# Patient Record
Sex: FEMALE | Race: WHITE | Employment: PART TIME | RURAL
[De-identification: names, ages, dates, MRNs, and addresses within clinical notes are randomized per-mention and may not be internally consistent; named-entity substitution may affect disease eponyms.]

---

## 2017-01-05 ENCOUNTER — TELEPHONE (OUTPATIENT)
Dept: FAMILY MEDICINE CLINIC | Age: 52
End: 2017-01-05

## 2017-01-05 DIAGNOSIS — F41.9 ANXIETY: ICD-10-CM

## 2017-01-05 RX ORDER — CLONAZEPAM 0.5 MG/1
0.5 TABLET ORAL
Qty: 10 TAB | Refills: 0 | Status: SHIPPED | OUTPATIENT
Start: 2017-01-05 | End: 2017-05-12

## 2017-01-05 NOTE — TELEPHONE ENCOUNTER
Pt is going to be flying in a couple weeks and would like something to help calm her down during flying only needs two pills because of direct flights.  Please send to [address]

## 2017-01-05 NOTE — TELEPHONE ENCOUNTER
I have printed rx for Klonopin, which she has taken in the past.  She can come  whenever she is able. Thanks!

## 2017-05-12 ENCOUNTER — OFFICE VISIT (OUTPATIENT)
Dept: FAMILY MEDICINE CLINIC | Facility: CLINIC | Age: 52
End: 2017-05-12

## 2017-05-12 VITALS
HEIGHT: 67 IN | TEMPERATURE: 98.7 F | BODY MASS INDEX: 21.93 KG/M2 | RESPIRATION RATE: 18 BRPM | DIASTOLIC BLOOD PRESSURE: 87 MMHG | WEIGHT: 139.7 LBS | OXYGEN SATURATION: 98 % | HEART RATE: 66 BPM | SYSTOLIC BLOOD PRESSURE: 113 MMHG

## 2017-05-12 DIAGNOSIS — J01.90 ACUTE NON-RECURRENT SINUSITIS, UNSPECIFIED LOCATION: Primary | ICD-10-CM

## 2017-05-12 RX ORDER — AMOXICILLIN AND CLAVULANATE POTASSIUM 875; 125 MG/1; MG/1
1 TABLET, FILM COATED ORAL 2 TIMES DAILY
Qty: 20 TAB | Refills: 0 | Status: SHIPPED | OUTPATIENT
Start: 2017-05-12 | End: 2017-05-22

## 2017-05-12 RX ORDER — GINKGO BILOBA LEAF EXTRACT 60 MG
CAPSULE ORAL
COMMUNITY
End: 2017-05-12

## 2017-05-12 NOTE — PATIENT INSTRUCTIONS
Sinusitis: Care Instructions  Your Care Instructions    Sinusitis is an infection of the lining of the sinus cavities in your head. Sinusitis often follows a cold. It causes pain and pressure in your head and face. In most cases, sinusitis gets better on its own in 1 to 2 weeks. But some mild symptoms may last for several weeks. Sometimes antibiotics are needed. Follow-up care is a key part of your treatment and safety. Be sure to make and go to all appointments, and call your doctor if you are having problems. It's also a good idea to know your test results and keep a list of the medicines you take. How can you care for yourself at home? · Take an over-the-counter pain medicine, such as acetaminophen (Tylenol), ibuprofen (Advil, Motrin), or naproxen (Aleve). Read and follow all instructions on the label. · If the doctor prescribed antibiotics, take them as directed. Do not stop taking them just because you feel better. You need to take the full course of antibiotics. · Be careful when taking over-the-counter cold or flu medicines and Tylenol at the same time. Many of these medicines have acetaminophen, which is Tylenol. Read the labels to make sure that you are not taking more than the recommended dose. Too much acetaminophen (Tylenol) can be harmful. · Breathe warm, moist air from a steamy shower, a hot bath, or a sink filled with hot water. Avoid cold, dry air. Using a humidifier in your home may help. Follow the directions for cleaning the machine. · Use saline (saltwater) nasal washes to help keep your nasal passages open and wash out mucus and bacteria. You can buy saline nose drops at a grocery store or drugstore. Or you can make your own at home by adding 1 teaspoon of salt and 1 teaspoon of baking soda to 2 cups of distilled water. If you make your own, fill a bulb syringe with the solution, insert the tip into your nostril, and squeeze gently. Gene Stone your nose.   · Put a hot, wet towel or a warm gel pack on your face 3 or 4 times a day for 5 to 10 minutes each time. · Try a decongestant nasal spray like oxymetazoline (Afrin). Do not use it for more than 3 days in a row. Using it for more than 3 days can make your congestion worse. When should you call for help? Call your doctor now or seek immediate medical care if:  · You have new or worse swelling or redness in your face or around your eyes. · You have a new or higher fever. Watch closely for changes in your health, and be sure to contact your doctor if:  · You have new or worse facial pain. · The mucus from your nose becomes thicker (like pus) or has new blood in it. · You are not getting better as expected. Where can you learn more? Go to http://tara-pat.info/. Enter Z934 in the search box to learn more about \"Sinusitis: Care Instructions. \"  Current as of: July 29, 2016  Content Version: 11.2  © 3810-3972 [employer]. Care instructions adapted under license by [employer] (which disclaims liability or warranty for this information). If you have questions about a medical condition or this instruction, always ask your healthcare professional. [place] [address] any warranty or liability for your use of this information.

## 2017-05-12 NOTE — MR AVS SNAPSHOT
Visit Information Date & Time Provider Department Dept. Phone Encounter #  
 5/12/2017  5:45 PM Jin Cortes, TERRELL Dorita Hull [facility] [place] [phone] [id number] Follow-up Instructions Return if symptoms worsen or fail to improve. Upcoming Health Maintenance Date Due Hepatitis C Screening 1965 PAP AKA CERVICAL CYTOLOGY 7/29/2015 BREAST CANCER SCRN MAMMOGRAM 10/22/2015 FOBT Q 1 YEAR AGE 50-75 10/22/2015 INFLUENZA AGE 9 TO ADULT 8/1/2017 DTaP/Tdap/Td series (2 - Td) 9/1/2020 Allergies as of 5/12/2017  Review Complete On: 5/12/2017 By: Jin Lyman 22, NP Severity Noted Reaction Type Reactions Naproxen  10/14/2015   Intolerance Other (comments) Abdominal Pain Current Immunizations  Reviewed on 5/9/2012 Name Date Hepatitis A Vaccine 9/16/2011 Hepatitis B Vaccine 5/9/2012, 10/17/2011, 9/16/2011 Influenza Vaccine Split 10/17/2011 TDAP Vaccine 9/1/2010 Not reviewed this visit You Were Diagnosed With   
  
 Codes Comments Acute non-recurrent sinusitis, unspecified location    -  Primary ICD-10-CM: J01.90 ICD-9-CM: 461.9 Vitals BP Pulse Temp Resp Height(growth percentile) Weight(growth percentile) 113/87 (BP 1 Location: Right arm, BP Patient Position: Sitting) 66 98.7 °F (37.1 °C) (Oral) 18 5' 7\" (1.702 m) 139 lb 11.2 oz (63.4 kg) LMP SpO2 BMI OB Status Smoking Status 08/19/2010 98% 21.88 kg/m2 Postmenopausal Never Smoker Vitals History BMI and BSA Data Body Mass Index Body Surface Area  
 21.88 kg/m 2 1.73 m 2 Preferred Pharmacy Pharmacy Name Phone [facility] [address] [phone] Your Updated Medication List  
  
   
This list is accurate as of: 5/12/17  6:00 PM.  Always use your most recent med list.  
  
  
  
  
 amoxicillin-clavulanate 875-125 mg per tablet Commonly known as:  AUGMENTIN  
 Take 1 Tab by mouth two (2) times a day for 10 days. aspirin 81 mg chewable tablet Take 1 Tab by mouth daily. Biotin 2,500 mcg Cap Take  by mouth. DEXILANT 60 mg Cpdb Generic drug:  Dexlansoprazole Take  by mouth. EVENING PRIMROSE 500 mg Cap Generic drug:  evening primrose oil Take  by mouth. Flaxseed Oil Oil  
by Does Not Apply route. GINKOBA PO Take  by mouth. LYSINE PO Take  by mouth.  
  
 multivitamin tablet Commonly known as:  ONE A DAY Take 1 Tab by mouth daily. VALTREX 500 mg tablet Generic drug:  valACYclovir Take  by mouth as needed. Prescriptions Sent to Pharmacy Refills  
 amoxicillin-clavulanate (AUGMENTIN) 875-125 mg per tablet 0 Sig: Take 1 Tab by mouth two (2) times a day for 10 days. Class: Normal  
 Pharmacy: [address] #: [phone] Route: Oral  
  
Follow-up Instructions Return if symptoms worsen or fail to improve. Patient Instructions Sinusitis: Care Instructions Your Care Instructions Sinusitis is an infection of the lining of the sinus cavities in your head. Sinusitis often follows a cold. It causes pain and pressure in your head and face. In most cases, sinusitis gets better on its own in 1 to 2 weeks. But some mild symptoms may last for several weeks. Sometimes antibiotics are needed. Follow-up care is a key part of your treatment and safety. Be sure to make and go to all appointments, and call your doctor if you are having problems. It's also a good idea to know your test results and keep a list of the medicines you take. How can you care for yourself at home? · Take an over-the-counter pain medicine, such as acetaminophen (Tylenol), ibuprofen (Advil, Motrin), or naproxen (Aleve). Read and follow all instructions on the label. · If the doctor prescribed antibiotics, take them as directed.  Do not stop taking them just because you feel better. You need to take the full course of antibiotics. · Be careful when taking over-the-counter cold or flu medicines and Tylenol at the same time. Many of these medicines have acetaminophen, which is Tylenol. Read the labels to make sure that you are not taking more than the recommended dose. Too much acetaminophen (Tylenol) can be harmful. · Breathe warm, moist air from a steamy shower, a hot bath, or a sink filled with hot water. Avoid cold, dry air. Using a humidifier in your home may help. Follow the directions for cleaning the machine. · Use saline (saltwater) nasal washes to help keep your nasal passages open and wash out mucus and bacteria. You can buy saline nose drops at a grocery store or drugstore. Or you can make your own at home by adding 1 teaspoon of salt and 1 teaspoon of baking soda to 2 cups of distilled water. If you make your own, fill a bulb syringe with the solution, insert the tip into your nostril, and squeeze gently. Lubna Lark your nose. · Put a hot, wet towel or a warm gel pack on your face 3 or 4 times a day for 5 to 10 minutes each time. · Try a decongestant nasal spray like oxymetazoline (Afrin). Do not use it for more than 3 days in a row. Using it for more than 3 days can make your congestion worse. When should you call for help? Call your doctor now or seek immediate medical care if: 
· You have new or worse swelling or redness in your face or around your eyes. · You have a new or higher fever. Watch closely for changes in your health, and be sure to contact your doctor if: 
· You have new or worse facial pain. · The mucus from your nose becomes thicker (like pus) or has new blood in it. · You are not getting better as expected. Where can you learn more? Go to http://tara-pat.info/. Enter T764 in the search box to learn more about \"Sinusitis: Care Instructions. \" Current as of: July 29, 2016 Content Version: 11.2 © 1028-4872 [employer]orporated. Care instructions adapted under license by [employer] (which disclaims liability or warranty for this information). If you have questions about a medical condition or this instruction, always ask your healthcare professional. [address] any warranty or liability for your use of this information. Introducing hospitals & HEALTH SERVICES! Dear Heath Sagastume: 
Thank you for requesting a Lumen[employer] account. Our records indicate that you already have an active Lumenpulse account. You can access your account anytime at [email or web address] Did you know that you can access your hospital and ER discharge instructions at any time in Lumenpulse? You can also review all of your test results from your hospital stay or ER visit. Additional Information If you have questions, please visit the Frequently Asked Questions section of the Lumenpulse website at [email or web address] Remember, Lumenpulse is NOT to be used for urgent needs. For medical emergencies, dial 911. Now available from your iPhone and Android! Please provide this summary of care documentation to your next provider. Your primary care clinician is listed as Becka Saba. If you have any questions after today's visit, please call [phone].

## 2017-07-18 ENCOUNTER — HOSPITAL ENCOUNTER (OUTPATIENT)
Dept: GENERAL RADIOLOGY | Age: 52
Discharge: HOME | End: 2017-07-18
Attending: PODIATRIST
Payer: COMMERCIAL

## 2017-07-18 DIAGNOSIS — S92.354A CLOSED NONDISPLACED FRACTURE OF FIFTH RIGHT METATARSAL BONE: ICD-10-CM

## 2017-07-18 PROCEDURE — 73630 X-RAY EXAM OF FOOT: CPT

## 2017-09-11 ENCOUNTER — OFFICE VISIT (OUTPATIENT)
Dept: FAMILY MEDICINE CLINIC | Age: 52
End: 2017-09-11

## 2017-09-11 VITALS
RESPIRATION RATE: 18 BRPM | SYSTOLIC BLOOD PRESSURE: 133 MMHG | HEART RATE: 70 BPM | HEIGHT: 67 IN | WEIGHT: 133 LBS | DIASTOLIC BLOOD PRESSURE: 90 MMHG | OXYGEN SATURATION: 98 % | TEMPERATURE: 98.1 F | BODY MASS INDEX: 20.88 KG/M2

## 2017-09-11 DIAGNOSIS — F41.0 PANIC DISORDER WITHOUT AGORAPHOBIA: ICD-10-CM

## 2017-09-11 DIAGNOSIS — F41.9 ANXIETY: Primary | ICD-10-CM

## 2017-09-11 DIAGNOSIS — Z00.00 HEALTHCARE MAINTENANCE: ICD-10-CM

## 2017-09-11 RX ORDER — CLONAZEPAM 0.5 MG/1
0.5 TABLET ORAL
Qty: 20 TAB | Refills: 0 | Status: SHIPPED | OUTPATIENT
Start: 2017-09-11 | End: 2018-05-24 | Stop reason: SDUPTHER

## 2017-09-11 RX ORDER — CITALOPRAM 20 MG/1
20 TABLET, FILM COATED ORAL DAILY
Qty: 90 TAB | Refills: 1 | Status: SHIPPED | OUTPATIENT
Start: 2017-09-11 | End: 2018-05-24 | Stop reason: ALTCHOICE

## 2017-09-11 NOTE — PATIENT INSTRUCTIONS
Citalopram: take 1/2 tablet daily for 1 week then take full tablet daily      Anxiety Disorder: Care Instructions  Your Care Instructions  Anxiety is a normal reaction to stress. Difficult situations can cause you to have symptoms such as sweaty palms and a nervous feeling. In an anxiety disorder, the symptoms are far more severe. Constant worry, muscle tension, trouble sleeping, nausea and diarrhea, and other symptoms can make normal daily activities difficult or impossible. These symptoms may occur for no reason, and they can affect your work, school, or social life. Medicines, counseling, and self-care can all help. Follow-up care is a key part of your treatment and safety. Be sure to make and go to all appointments, and call your doctor if you are having problems. It's also a good idea to know your test results and keep a list of the medicines you take. How can you care for yourself at home? · Take medicines exactly as directed. Call your doctor if you think you are having a problem with your medicine. · Go to your counseling sessions and follow-up appointments. · Recognize and accept your anxiety. Then, when you are in a situation that makes you anxious, say to yourself, \"This is not an emergency. I feel uncomfortable, but I am not in danger. I can keep going even if I feel anxious. \"  · Be kind to your body:  ¨ Relieve tension with exercise or a massage. ¨ Get enough rest.  ¨ Avoid alcohol, caffeine, nicotine, and illegal drugs. They can increase your anxiety level and cause sleep problems. ¨ Learn and do relaxation techniques. See below for more about these techniques. · Engage your mind. Get out and do something you enjoy. Go to a funny movie, or take a walk or hike. Plan your day. Having too much or too little to do can make you anxious. · Keep a record of your symptoms. Discuss your fears with a good friend or family member, or join a support group for people with similar problems.  Talking to others sometimes relieves stress. · Get involved in social groups, or volunteer to help others. Being alone sometimes makes things seem worse than they are. · Get at least 30 minutes of exercise on most days of the week to relieve stress. Walking is a good choice. You also may want to do other activities, such as running, swimming, cycling, or playing tennis or team sports. Relaxation techniques  Do relaxation exercises 10 to 20 minutes a day. You can play soothing, relaxing music while you do them, if you wish. · Tell others in your house that you are going to do your relaxation exercises. Ask them not to disturb you. · Find a comfortable place, away from all distractions and noise. · Lie down on your back, or sit with your back straight. · Focus on your breathing. Make it slow and steady. · Breathe in through your nose. Breathe out through either your nose or mouth. · Breathe deeply, filling up the area between your navel and your rib cage. Breathe so that your belly goes up and down. · Do not hold your breath. · Breathe like this for 5 to 10 minutes. Notice the feeling of calmness throughout your whole body. As you continue to breathe slowly and deeply, relax by doing the following for another 5 to 10 minutes:  · Tighten and relax each muscle group in your body. You can begin at your toes and work your way up to your head. · Imagine your muscle groups relaxing and becoming heavy. · Empty your mind of all thoughts. · Let yourself relax more and more deeply. · Become aware of the state of calmness that surrounds you. · When your relaxation time is over, you can bring yourself back to alertness by moving your fingers and toes and then your hands and feet and then stretching and moving your entire body. Sometimes people fall asleep during relaxation, but they usually wake up shortly afterward.   · Always give yourself time to return to full alertness before you drive a car or do anything that might cause an accident if you are not fully alert. Never play a relaxation tape while you drive a car. When should you call for help? Call 911 anytime you think you may need emergency care. For example, call if:  · You feel you cannot stop from hurting yourself or someone else. Keep the numbers for these national suicide hotlines: 7-212-879-TALK (3-222.912.9733) and 0-316-GUDDEWX (2-159.839.9838). If you or someone you know talks about suicide or feeling hopeless, get help right away. Watch closely for changes in your health, and be sure to contact your doctor if:  · You have anxiety or fear that affects your life. · You have symptoms of anxiety that are new or different from those you had before. Where can you learn more? Go to http://tara-pat.info/. Enter P754 in the search box to learn more about \"Anxiety Disorder: Care Instructions. \"  Current as of: July 26, 2016  Content Version: 11.3  © 0522-4089 [employer]. Care instructions adapted under license by [employer] (which disclaims liability or warranty for this information). If you have questions about a medical condition or this instruction, always ask your healthcare professional. [address] any warranty or liability for your use of this information.

## 2017-09-11 NOTE — MR AVS SNAPSHOT
Visit Information Date & Time Provider Department Dept. Phone Encounter #  
 9/11/2017 10:15 AM Jayde Goldsmith Narciso William [phone] [id number] Follow-up Instructions Return if symptoms worsen or fail to improve. Upcoming Health Maintenance Date Due Hepatitis C Screening 1965 PAP AKA CERVICAL CYTOLOGY 7/29/2015 BREAST CANCER SCRN MAMMOGRAM 10/22/2015 FOBT Q 1 YEAR AGE 50-75 10/22/2015 INFLUENZA AGE 9 TO ADULT 8/1/2017 DTaP/Tdap/Td series (2 - Td) 9/1/2020 Allergies as of 9/11/2017  Review Complete On: 9/11/2017 By: Jayde Goldsmith MD  
  
 Severity Noted Reaction Type Reactions Naproxen  10/14/2015   Intolerance Other (comments) Abdominal Pain Current Immunizations  Reviewed on 5/9/2012 Name Date Hepatitis A Vaccine 9/16/2011 Hepatitis B Vaccine 5/9/2012, 10/17/2011, 9/16/2011 Influenza Vaccine Split 10/17/2011 TDAP Vaccine 9/1/2010 Not reviewed this visit You Were Diagnosed With   
  
 Codes Comments Anxiety    -  Primary ICD-10-CM: F41.9 ICD-9-CM: 300.00 Panic disorder without agoraphobia     ICD-10-CM: F41.0 ICD-9-CM: 300.01 Vitals BP Pulse Temp Resp Height(growth percentile) Weight(growth percentile) 133/90 (BP 1 Location: Right arm, BP Patient Position: Sitting) 70 98.1 °F (36.7 °C) 18 5' 7\" (1.702 m) 133 lb (60.3 kg) LMP SpO2 BMI OB Status Smoking Status 08/19/2010 98% 20.83 kg/m2 Postmenopausal Never Smoker Vitals History BMI and BSA Data Body Mass Index Body Surface Area  
 20.83 kg/m 2 1.69 m 2 Preferred Pharmacy Pharmacy Name Phone [facility] [address] [place] [address] [phone] Your Updated Medication List  
  
   
This list is accurate as of: 9/11/17 10:34 AM.  Always use your most recent med list.  
  
  
  
  
 aspirin 81 mg chewable tablet Take 1 Tab by mouth daily. citalopram 20 mg tablet Commonly known as:  Josesito Bee Take 1 Tab by mouth daily. clonazePAM 0.5 mg tablet Commonly known as:  Denzel Amezcua Take 1 Tab by mouth nightly as needed. Max Daily Amount: 0.5 mg. Indications: PANIC DISORDER DEXILANT 60 mg Cpdb Generic drug:  Dexlansoprazole Take  by mouth. EVENING PRIMROSE OIL PO Take  by mouth daily. Flaxseed Oil Oil  
by Does Not Apply route. GINKOBA PO Take  by mouth. LYSINE PO Take  by mouth.  
  
 multivitamin tablet Commonly known as:  ONE A DAY Take 1 Tab by mouth daily. VALTREX 500 mg tablet Generic drug:  valACYclovir Take  by mouth as needed. Prescriptions Printed Refills  
 clonazePAM (KLONOPIN) 0.5 mg tablet 0 Sig: Take 1 Tab by mouth nightly as needed. Max Daily Amount: 0.5 mg. Indications: PANIC DISORDER Class: Print Route: Oral  
  
Prescriptions Sent to Pharmacy Refills  
 citalopram (CELEXA) 20 mg tablet 1 Sig: Take 1 Tab by mouth daily. Class: Normal  
 Pharmacy: [address] [place] [address] #: [phone] Route: Oral  
  
Follow-up Instructions Return if symptoms worsen or fail to improve. Patient Instructions Citalopram: take 1/2 tablet daily for 1 week then take full tablet daily Anxiety Disorder: Care Instructions Your Care Instructions Anxiety is a normal reaction to stress. Difficult situations can cause you to have symptoms such as sweaty palms and a nervous feeling. In an anxiety disorder, the symptoms are far more severe. Constant worry, muscle tension, trouble sleeping, nausea and diarrhea, and other symptoms can make normal daily activities difficult or impossible. These symptoms may occur for no reason, and they can affect your work, school, or social life. Medicines, counseling, and self-care can all help. Follow-up care is a key part of your treatment and safety.  Be sure to make and go to all appointments, and call your doctor if you are having problems. It's also a good idea to know your test results and keep a list of the medicines you take. How can you care for yourself at home? · Take medicines exactly as directed. Call your doctor if you think you are having a problem with your medicine. · Go to your counseling sessions and follow-up appointments. · Recognize and accept your anxiety. Then, when you are in a situation that makes you anxious, say to yourself, \"This is not an emergency. I feel uncomfortable, but I am not in danger. I can keep going even if I feel anxious. \" · Be kind to your body: ¨ Relieve tension with exercise or a massage. ¨ Get enough rest. 
¨ Avoid alcohol, caffeine, nicotine, and illegal drugs. They can increase your anxiety level and cause sleep problems. ¨ Learn and do relaxation techniques. See below for more about these techniques. · Engage your mind. Get out and do something you enjoy. Go to a funny movie, or take a walk or hike. Plan your day. Having too much or too little to do can make you anxious. · Keep a record of your symptoms. Discuss your fears with a good friend or family member, or join a support group for people with similar problems. Talking to others sometimes relieves stress. · Get involved in social groups, or volunteer to help others. Being alone sometimes makes things seem worse than they are. · Get at least 30 minutes of exercise on most days of the week to relieve stress. Walking is a good choice. You also may want to do other activities, such as running, swimming, cycling, or playing tennis or team sports. Relaxation techniques Do relaxation exercises 10 to 20 minutes a day. You can play soothing, relaxing music while you do them, if you wish. · Tell others in your house that you are going to do your relaxation exercises. Ask them not to disturb you. · Find a comfortable place, away from all distractions and noise. · Lie down on your back, or sit with your back straight. · Focus on your breathing. Make it slow and steady. · Breathe in through your nose. Breathe out through either your nose or mouth. · Breathe deeply, filling up the area between your navel and your rib cage. Breathe so that your belly goes up and down. · Do not hold your breath. · Breathe like this for 5 to 10 minutes. Notice the feeling of calmness throughout your whole body. As you continue to breathe slowly and deeply, relax by doing the following for another 5 to 10 minutes: · Tighten and relax each muscle group in your body. You can begin at your toes and work your way up to your head. · Imagine your muscle groups relaxing and becoming heavy. · Empty your mind of all thoughts. · Let yourself relax more and more deeply. · Become aware of the state of calmness that surrounds you. · When your relaxation time is over, you can bring yourself back to alertness by moving your fingers and toes and then your hands and feet and then stretching and moving your entire body. Sometimes people fall asleep during relaxation, but they usually wake up shortly afterward. · Always give yourself time to return to full alertness before you drive a car or do anything that might cause an accident if you are not fully alert. Never play a relaxation tape while you drive a car. When should you call for help? Call 911 anytime you think you may need emergency care. For example, call if: 
· You feel you cannot stop from hurting yourself or someone else. Keep the numbers for these national suicide hotlines: 9-742-382-TALK (7-515-737-430.418.8679) and 2-368-MFSXSWR (3-316.929.2156). If you or someone you know talks about suicide or feeling hopeless, get help right away. Watch closely for changes in your health, and be sure to contact your doctor if: 
· You have anxiety or fear that affects your life.  
· You have symptoms of anxiety that are new or different from those you had before. Where can you learn more? Go to http://tara-pat.info/. Enter P754 in the search box to learn more about \"Anxiety Disorder: Care Instructions. \" Current as of: July 26, 2016 Content Version: 11.3 © 3334-4403 [employer]. Care instructions adapted under license by [employer] (which disclaims liability or warranty for this information). If you have questions about a medical condition or this instruction, always ask your healthcare professional. [address] any warranty or liability for your use of this information. Introducing [facility]! Dear Estefani Buckner: 
Thank you for requesting a Stop Being Watched account. Our records indicate that you already have an active Stop Being Watched account. You can access your account anytime at [email or web address] Did you know that you can access your hospital and ER discharge instructions at any time in Stop Being Watched? You can also review all of your test results from your hospital stay or ER visit. Additional Information If you have questions, please visit the Frequently Asked Questions section of the Stop Being Watched website at [email or web address] Remember, Stop Being Watched is NOT to be used for urgent needs. For medical emergencies, dial 911. Now available from your iPhone and Android! Please provide this summary of care documentation to your next provider. Your primary care clinician is listed as Elva Shoemaker. If you have any questions after today's visit, please call [phone].

## 2017-09-11 NOTE — PROGRESS NOTES
Identified pt with two pt identifiers(name and ). Chief Complaint   Patient presents with    Medication Evaluation     Citilopram and Clonazapam        Health Maintenance Due   Topic    Hepatitis C Screening     PAP AKA CERVICAL CYTOLOGY     BREAST CANCER SCRN MAMMOGRAM     FOBT Q 1 YEAR AGE 50-75     INFLUENZA AGE 9 TO ADULT        Wt Readings from Last 3 Encounters:   17 133 lb (60.3 kg)   17 139 lb 11.2 oz (63.4 kg)   10/31/16 134 lb (60.8 kg)     Temp Readings from Last 3 Encounters:   17 98.1 °F (36.7 °C)   17 98.7 °F (37.1 °C) (Oral)   10/31/16 98.2 °F (36.8 °C) (Oral)     BP Readings from Last 3 Encounters:   17 133/90   17 113/87   10/31/16 118/74     Pulse Readings from Last 3 Encounters:   17 70   17 66   10/31/16 69         Learning Assessment:  :     Learning Assessment 10/14/2015 3/12/2014   PRIMARY LEARNER Patient Patient   HIGHEST LEVEL OF EDUCATION - PRIMARY LEARNER  - SOME COLLEGE   BARRIERS PRIMARY LEARNER NONE NONE   CO-LEARNER CAREGIVER - No   PRIMARY LANGUAGE ENGLISH ENGLISH   LEARNER PREFERENCE PRIMARY READING READING   ANSWERED BY Jeana patient   RELATIONSHIP SELF SELF       Depression Screening:  :     PHQ over the last two weeks 10/31/2016   Little interest or pleasure in doing things Not at all   Feeling down, depressed or hopeless Not at all   Total Score PHQ 2 0       Fall Risk Assessment:  :     No flowsheet data found. Abuse Screening:  :     Abuse Screening Questionnaire 3/12/2014   Do you ever feel afraid of your partner? N   Are you in a relationship with someone who physically or mentally threatens you? N   Is it safe for you to go home?  Y       Coordination of Care Questionnaire:  :     1) Have you been to an emergency room, urgent care clinic since your last visit? no   Hospitalized since your last visit? no             2) Have you seen or consulted any other health care providers outside of [address] [place] [address] since your last visit? no  (Include any pap smears or colon screenings in this section.)    3) Do you have an Advance Directive on file? no  Are you interested in receiving information about Advance Directives? no    Reviewed record in preparation for visit and have obtained necessary documentation. Medication reconciliation up to date and corrected with patient at this time.

## 2017-09-11 NOTE — PROGRESS NOTES
Subjective:      Mabel Jones is a 46 y.o. female here for anxiety follow up and to discuss medication. She has the following anxiety symptoms: chest pain, shortness of breath, psychomotor agitation. Onset of symptoms was approximately several years ago, gradually worsening since that time. She denies current suicidal and homicidal ideation. Possible organic causes contributing are: none. Previous treatment includes Celexa, clonazepam. She complains of the following side effects from the treatment: finds that clonazepam causes her to feel groggy after taking. Reports having panic attacks last Thursday for which she had to take 1/2 tablet of clonazepam with improvement. Has not been compliant with previous citalopram therapy, but feels as though she needs a daily medication. Current Outpatient Prescriptions   Medication Sig Dispense Refill    GINKGO BILOBA (GINKOBA PO) Take  by mouth.  EVENING PRIMROSE OIL PO Take  by mouth daily.  Dexlansoprazole (DEXILANT) 60 mg CpDB Take  by mouth.  LYSINE PO Take  by mouth.  Flaxseed Oil oil by Does Not Apply route.  valACYclovir (VALTREX) 500 mg tablet Take  by mouth as needed.  aspirin 81 mg chewable tablet Take 1 Tab by mouth daily. 30 Tab 0    multivitamin (ONE A DAY) tablet Take 1 Tab by mouth daily.          Allergies   Allergen Reactions    Naproxen Other (comments)     Abdominal Pain       Past Medical History:   Diagnosis Date    Anxiety     Arthritis     Chronic neck pain 6/27/2015    Fatigue     GERD (gastroesophageal reflux disease) 3/12/2014    Headache     Hiatal hernia     PUD (peptic ulcer disease)     TIA (transient ischemic attack) 6/27/2015    Unspecified hypothyroidism 12/19/2012       Social History   Substance Use Topics    Smoking status: Never Smoker    Smokeless tobacco: Never Used    Alcohol use 1.8 - 3.0 oz/week     3 - 5 Glasses of wine per week      Comment: occ        Review of Systems  Pertinent items are noted in HPI. Objective:     Visit Vitals    /90 (BP 1 Location: Right arm, BP Patient Position: Sitting)    Pulse 70    Temp 98.1 °F (36.7 °C)    Resp 18    Ht 5' 7\" (1.702 m)    Wt 133 lb (60.3 kg)    LMP 08/19/2010    SpO2 98%    BMI 20.83 kg/m2      General appearance - alert, well appearing, and in no distress  Eyes - pupils equal and reactive, extraocular eye movements intact, sclera anicteric  Chest - clear to auscultation, no wheezes, rales or rhonchi, symmetric air entry, no tachypnea, retractions or cyanosis  Heart - normal rate, regular rhythm, normal S1, S2, no murmurs, rubs, clicks or gallops  Neurological - alert, oriented, normal speech, no focal findings or movement disorder noted  Mental Status - alert, oriented to person, place, and time, normal mood, behavior, speech, dress, motor activity, and thought processes    Assessment/Plan:   Meg Spangler is a 46 y.o. female seen for:     1. Anxiety: with intermittent panic attacks. Has previously been on citalopram therapy which she reports she was not overly compliant with. Will restart therapy at this time (advised to take 1/2 tablet x 1 week then increase to full tablet daily). - citalopram (CELEXA) 20 mg tablet; Take 1 Tab by mouth daily. Dispense: 90 Tab; Refill: 1  - clonazePAM (KLONOPIN) 0.5 mg tablet; Take 1 Tab by mouth nightly as needed. Max Daily Amount: 0.5 mg. Indications: PANIC DISORDER  Dispense: 20 Tab; Refill: 0    2. Panic disorder without agoraphobia: intermittent panic attacks, start daily citalopram therapy as above. Continue with PRN use of clonazepam (prescription monitoring appropriate). - clonazePAM (KLONOPIN) 0.5 mg tablet; Take 1 Tab by mouth nightly as needed. Max Daily Amount: 0.5 mg. Indications: PANIC DISORDER  Dispense: 20 Tab; Refill: 0    3. Healthcare maintenance: reviewed. Gynecological care provided by Dr. Jessee Agrawal with [facility].  Colonoscopy has been performed by  Pawan Sales. Copy of these procedures has been requested. I have discussed the diagnosis with the patient and the intended plan as seen in the above orders. The patient has received an after-visit summary and questions were answered concerning future plans. I have discussed medication side effects and warnings with the patient as well. Patient verbalizes understanding of plan of care and denies further questions or concerns at this time. Informed patient to return to the office if symptoms worsen or if new symptoms arise. Follow-up Disposition:  Return if symptoms worsen or fail to improve.

## 2018-01-04 ENCOUNTER — OFFICE VISIT (OUTPATIENT)
Dept: FAMILY MEDICINE CLINIC | Age: 53
End: 2018-01-04

## 2018-01-04 VITALS
HEIGHT: 67 IN | TEMPERATURE: 98 F | WEIGHT: 132 LBS | OXYGEN SATURATION: 99 % | RESPIRATION RATE: 18 BRPM | HEART RATE: 64 BPM | SYSTOLIC BLOOD PRESSURE: 115 MMHG | DIASTOLIC BLOOD PRESSURE: 79 MMHG | BODY MASS INDEX: 20.72 KG/M2

## 2018-01-04 DIAGNOSIS — R07.89 CHEST WALL PAIN: Primary | ICD-10-CM

## 2018-01-04 DIAGNOSIS — R07.9 CHEST PAIN, UNSPECIFIED TYPE: ICD-10-CM

## 2018-01-04 DIAGNOSIS — K21.9 GASTROESOPHAGEAL REFLUX DISEASE WITHOUT ESOPHAGITIS: ICD-10-CM

## 2018-01-04 RX ORDER — CYCLOBENZAPRINE HCL 10 MG
10 TABLET ORAL
Qty: 21 TAB | Refills: 0 | Status: SHIPPED | OUTPATIENT
Start: 2018-01-04 | End: 2018-07-02 | Stop reason: SDUPTHER

## 2018-01-04 RX ORDER — DICLOFENAC SODIUM 75 MG/1
75 TABLET, DELAYED RELEASE ORAL 2 TIMES DAILY
Qty: 60 TAB | Refills: 0 | Status: SHIPPED | OUTPATIENT
Start: 2018-01-04 | End: 2018-09-26

## 2018-01-04 NOTE — MR AVS SNAPSHOT
Visit Information Date & Time Provider Department Dept. Phone Encounter #  
 1/4/2018 11:00 AM James Nunes  [address] [phone] [id number] Follow-up Instructions Return if symptoms worsen or fail to improve. Upcoming Health Maintenance Date Due Hepatitis C Screening 1965 PAP AKA CERVICAL CYTOLOGY 7/29/2015 BREAST CANCER SCRN MAMMOGRAM 10/22/2015 FOBT Q 1 YEAR AGE 50-75 10/22/2015 Influenza Age 5 to Adult 8/1/2017 DTaP/Tdap/Td series (2 - Td) 9/1/2020 Allergies as of 1/4/2018  Review Complete On: 1/4/2018 By: James Nunes NP Severity Noted Reaction Type Reactions Naproxen  10/14/2015   Intolerance Other (comments) Abdominal Pain Current Immunizations  Reviewed on 5/9/2012 Name Date Hepatitis A Vaccine 9/16/2011 Hepatitis B Vaccine 5/9/2012, 10/17/2011, 9/16/2011 Influenza Vaccine Split 10/17/2011 TDAP Vaccine 9/1/2010 Not reviewed this visit You Were Diagnosed With   
  
 Codes Comments Chest wall pain    -  Primary ICD-10-CM: R07.89 ICD-9-CM: 786.52 Chest pain, unspecified type     ICD-10-CM: R07.9 ICD-9-CM: 786.50 Gastroesophageal reflux disease without esophagitis     ICD-10-CM: K21.9 ICD-9-CM: 530.81 Vitals BP Pulse Temp Resp Height(growth percentile) Weight(growth percentile) 115/79 64 98 °F (36.7 °C) (Oral) 18 5' 7\" (1.702 m) 132 lb (59.9 kg) LMP SpO2 BMI OB Status Smoking Status 08/19/2010 99% 20.67 kg/m2 Postmenopausal Never Smoker Vitals History BMI and BSA Data Body Mass Index Body Surface Area  
 20.67 kg/m 2 1.68 m 2 Preferred Pharmacy Pharmacy Name Phone [address] [phone] Your Updated Medication List  
  
   
This list is accurate as of: 1/4/18 11:30 AM.  Always use your most recent med list.  
  
  
  
  
 aspirin 81 mg chewable tablet Take 1 Tab by mouth daily. citalopram 20 mg tablet Commonly known as:  Zelphia Lennox Take 1 Tab by mouth daily. clonazePAM 0.5 mg tablet Commonly known as:  Purnima Prescott Take 1 Tab by mouth nightly as needed. Max Daily Amount: 0.5 mg. Indications: PANIC DISORDER  
  
 cyclobenzaprine 10 mg tablet Commonly known as:  FLEXERIL Take 1 Tab by mouth three (3) times daily as needed for Muscle Spasm(s). DEXILANT 60 mg Cpdb Generic drug:  Dexlansoprazole Take  by mouth. diclofenac EC 75 mg EC tablet Commonly known as:  VOLTAREN Take 1 Tab by mouth two (2) times a day. EVENING PRIMROSE OIL PO Take  by mouth daily. Flaxseed Oil Oil  
by Does Not Apply route. GINKOBA PO Take  by mouth. LYSINE PO Take  by mouth.  
  
 multivitamin tablet Commonly known as:  ONE A DAY Take 1 Tab by mouth daily. VALTREX 500 mg tablet Generic drug:  valACYclovir Take  by mouth as needed. Prescriptions Sent to Pharmacy Refills  
 diclofenac EC (VOLTAREN) 75 mg EC tablet 0 Sig: Take 1 Tab by mouth two (2) times a day. Class: Normal  
 Pharmacy: [address] [place] [address] Ph #: [phone] Route: Oral  
 cyclobenzaprine (FLEXERIL) 10 mg tablet 0 Sig: Take 1 Tab by mouth three (3) times daily as needed for Muscle Spasm(s). Class: Normal  
 Pharmacy: [address] [place] [address] Ph #: [phone] Route: Oral  
  
We Performed the Following AMB POC EKG ROUTINE W/ 12 LEADS, INTER & REP [73897 CPT(R)] CBC W/O DIFF [97837 CPT(R)] LIPASE [id number] CPT(R)] METABOLIC PANEL, COMPREHENSIVE [76236 CPT(R)] Follow-up Instructions Return if symptoms worsen or fail to improve. To-Do List   
 01/05/2018 Imaging:  XR CHEST PA LAT Patient Instructions Costochondritis: Care Instructions Your Care Instructions You have chest pain because the cartilage of your rib cage is inflamed. This problem is called costochondritis. This type of chest wall pain may last from days to weeks. It is not a heart problem. Sometimes costochondritis occurs with a cold or the flu, and other times the exact cause is not known. Follow-up care is a key part of your treatment and safety. Be sure to make and go to all appointments, and call your doctor if you are having problems. It's also a good idea to know your test results and keep a list of the medicines you take. How can you care for yourself at home? · Take medicines for pain and inflammation exactly as directed. ¨ If the doctor gave you a prescription medicine, take it as prescribed. ¨ If you are not taking a prescription pain medicine, ask your doctor if you can take an over-the-counter medicine. ¨ Do not take two or more pain medicines at the same time unless the doctor told you to. Many pain medicines have acetaminophen, which is Tylenol. Too much acetaminophen (Tylenol) can be harmful. · It may help to use a warm compress or heating pad (set on low) on your chest. You can also try alternating heat and ice. Put ice or a cold pack on the area for 10 to 20 minutes at a time. Put a thin cloth between the ice and your skin. · Avoid any activity that strains the chest area. As your pain gets better, you can slowly return to your normal activities. · Do not use tape, an elastic bandage, a \"rib belt,\" or anything else that restricts your chest wall motion. When should you call for help? Call 911 anytime you think you may need emergency care. For example, call if: 
? · You have new or different chest pain or pressure. This may occur with: ¨ Sweating. ¨ Shortness of breath. ¨ Nausea or vomiting. ¨ Pain that spreads from the chest to the neck, jaw, or one or both shoulders or arms. ¨ Dizziness or lightheadedness. ¨ A fast or uneven pulse. After calling 911, chew 1 adult-strength aspirin. Wait for an ambulance. Do not try to drive yourself. ? · You have severe trouble breathing. ?Call your doctor now or seek immediate medical care if: 
? · You have a fever or cough. ? · You have any trouble breathing. ? · Your chest pain gets worse. ? Watch closely for changes in your health, and be sure to contact your doctor if: 
? · Your chest pain continues even though you are taking anti-inflammatory medicine. ? · Your chest wall pain has not improved after 5 to 7 days. Where can you learn more? Go to http://tara-pat.info/. Enter S950 in the search box to learn more about \"Costochondritis: Care Instructions. \" Current as of: March 20, 2017 Content Version: 11.4 © 2129-0440 [employer]ia [employer]. Care instructions adapted under license by [employer] (which disclaims liability or warranty for this information). If you have questions about a medical condition or this instruction, always ask your healthcare professional. [place] [address] any warranty or liability for your use of this information. Introducing [address]! Dear Lyndon Calderon: 
Thank you for requesting a [email or web address] account. Our records indicate that you already have an active [email or web address] account. You can access your account anytime at [email or web address] Did you know that you can access your hospital and ER discharge instructions at any time in [email or web address]? You can also review all of your test results from your hospital stay or ER visit. Additional Information If you have questions, please visit the Frequently Asked Questions section of the [email or web address] website at [email or web address] Remember, [email or web address] is NOT to be used for urgent needs. For medical emergencies, dial 911. Now available from your iPhone and Android! Please provide this summary of care documentation to your next provider. Your primary care clinician is listed as Igor Lu. If you have any questions after today's visit, please call [phone].

## 2018-01-04 NOTE — PROGRESS NOTES
Chief Complaint   Patient presents with    Pain (Chronic)     pt states she has been having a weird pain and ache sensation for 4 months now. \"REVIEWED RECORD IN PREPARATION FOR VISIT AND HAVE OBTAINED THE NECESSARY DOCUMENTATION\"  1. Have you been to the ER, urgent care clinic since your last visit? Hospitalized since your last visit? No    2. Have you seen or consulted any other health care providers outside of the [address] [place] [address] since your last visit? Include any pap smears or colon screening. No  Patient does not have advanced directives.

## 2018-01-04 NOTE — PATIENT INSTRUCTIONS
Costochondritis: Care Instructions  Your Care Instructions  You have chest pain because the cartilage of your rib cage is inflamed. This problem is called costochondritis. This type of chest wall pain may last from days to weeks. It is not a heart problem. Sometimes costochondritis occurs with a cold or the flu, and other times the exact cause is not known. Follow-up care is a key part of your treatment and safety. Be sure to make and go to all appointments, and call your doctor if you are having problems. It's also a good idea to know your test results and keep a list of the medicines you take. How can you care for yourself at home? · Take medicines for pain and inflammation exactly as directed. ¨ If the doctor gave you a prescription medicine, take it as prescribed. ¨ If you are not taking a prescription pain medicine, ask your doctor if you can take an over-the-counter medicine. ¨ Do not take two or more pain medicines at the same time unless the doctor told you to. Many pain medicines have acetaminophen, which is Tylenol. Too much acetaminophen (Tylenol) can be harmful. · It may help to use a warm compress or heating pad (set on low) on your chest. You can also try alternating heat and ice. Put ice or a cold pack on the area for 10 to 20 minutes at a time. Put a thin cloth between the ice and your skin. · Avoid any activity that strains the chest area. As your pain gets better, you can slowly return to your normal activities. · Do not use tape, an elastic bandage, a \"rib belt,\" or anything else that restricts your chest wall motion. When should you call for help? Call 911 anytime you think you may need emergency care. For example, call if:  ? · You have new or different chest pain or pressure. This may occur with:  ¨ Sweating. ¨ Shortness of breath. ¨ Nausea or vomiting. ¨ Pain that spreads from the chest to the neck, jaw, or one or both shoulders or arms. ¨ Dizziness or lightheadedness.   ¨ A fast or uneven pulse. After calling 911, chew 1 adult-strength aspirin. Wait for an ambulance. Do not try to drive yourself. ? · You have severe trouble breathing. ?Call your doctor now or seek immediate medical care if:  ? · You have a fever or cough. ? · You have any trouble breathing. ? · Your chest pain gets worse. ? Watch closely for changes in your health, and be sure to contact your doctor if:  ? · Your chest pain continues even though you are taking anti-inflammatory medicine. ? · Your chest wall pain has not improved after 5 to 7 days. Where can you learn more? Go to http://tara-pat.info/. Enter H841 in the search box to learn more about \"Costochondritis: Care Instructions. \"  Current as of: March 20, 2017  Content Version: 11.4  © 2427-4523 [employer]. Care instructions adapted under license by [employer] (which disclaims liability or warranty for this information). If you have questions about a medical condition or this instruction, always ask your healthcare professional. [address] any warranty or liability for your use of this information.

## 2018-01-04 NOTE — PROGRESS NOTES
HISTORY OF PRESENT ILLNESS  Paty Theodore is a 46 y.o. female. HPI  Pt presents with \"pain\"    Pt states that in September (4 months ago), she was laying in bed and felt this weird pain/ache on the right side of her upper chest.  Pt states that the pain has been constant since then. The pain is not severe, but patient describes it as a nagging, aching, pain. Sometimes, the pain feels like it will radiate/shoot through her back. Pain is not worse with movement. No shortness of breath with the pain. Pain is not better or worse with eating. She had a mammogram in September and it was normal.  Pt does have a hiatal hernia, and does have her gallbladder. No change in bowel or bladder habits. Pt always has GERD, no increase or change in this. Review of Systems   Constitutional: Negative for fever. HENT: Negative for congestion. Respiratory: Negative for cough. Gastrointestinal: Negative for abdominal pain, diarrhea and vomiting. Physical Exam   Constitutional: She is oriented to person, place, and time. She appears well-developed and well-nourished. HENT:   Head: Normocephalic and atraumatic. Cardiovascular: Normal rate, regular rhythm and normal heart sounds. Pulmonary/Chest: Effort normal and breath sounds normal. She has no wheezes. She has no rales. She exhibits no tenderness. Neurological: She is alert and oriented to person, place, and time. Skin: Skin is warm and dry. Psychiatric: She has a normal mood and affect. Her behavior is normal.       ASSESSMENT and PLAN    ICD-10-CM ICD-9-CM    1. Chest wall pain R07.89 786.52 XR CHEST PA LAT      diclofenac EC (VOLTAREN) 75 mg EC tablet      cyclobenzaprine (FLEXERIL) 10 mg tablet   2. Chest pain, unspecified type R07.9 786.50 AMB POC EKG ROUTINE W/ 12 LEADS, INTER & REP   3.  Gastroesophageal reflux disease without esophagitis K21.9 530.81 CBC W/O DIFF      METABOLIC PANEL, COMPREHENSIVE      LIPASE     Informed patient that she should try the NSAID and muscle relaxer, to see if it is soft tissue in origin. Educated about always going to the ER with chest pain, shortness of breath, etc.  We will notify her when x-ray and labs return. Pt informed to return to office with worsening of symptoms, or PRN with any questions or concerns. Pt verbalizes understanding of plan of care and denies further questions or concerns at this time.

## 2018-03-01 ENCOUNTER — HOSPITAL ENCOUNTER (OUTPATIENT)
Dept: GENERAL RADIOLOGY | Age: 53
Discharge: HOME | End: 2018-03-01
Attending: NURSE PRACTITIONER
Payer: COMMERCIAL

## 2018-03-01 DIAGNOSIS — R07.89 CHEST WALL PAIN: ICD-10-CM

## 2018-03-01 PROCEDURE — 71046 X-RAY EXAM CHEST 2 VIEWS: CPT

## 2018-03-02 ENCOUNTER — TELEPHONE (OUTPATIENT)
Dept: FAMILY MEDICINE CLINIC | Age: 53
End: 2018-03-02

## 2018-03-02 NOTE — TELEPHONE ENCOUNTER
----- Message from Kelley Ingram NP sent at 3/2/2018  7:59 AM EST -----  Please call patient and let her know that her x-ray returned and was normal.  This is reassuring. Thanks!

## 2018-03-02 NOTE — PROGRESS NOTES
Please call patient and let her know that her x-ray returned and was normal.  This is reassuring. Thanks!

## 2018-05-12 LAB
ALBUMIN SERPL-MCNC: 4.7 G/DL (ref 3.5–5.5)
ALBUMIN/GLOB SERPL: 2.5 {RATIO} (ref 1.2–2.2)
ALP SERPL-CCNC: 70 IU/L (ref 39–117)
ALT SERPL-CCNC: 12 IU/L (ref 0–32)
AST SERPL-CCNC: 18 IU/L (ref 0–40)
BILIRUB SERPL-MCNC: 0.5 MG/DL (ref 0–1.2)
BUN SERPL-MCNC: 10 MG/DL (ref 6–24)
BUN/CREAT SERPL: 14 (ref 9–23)
CALCIUM SERPL-MCNC: 9.4 MG/DL (ref 8.7–10.2)
CHLORIDE SERPL-SCNC: 101 MMOL/L (ref 96–106)
CO2 SERPL-SCNC: 24 MMOL/L (ref 18–29)
CREAT SERPL-MCNC: 0.7 MG/DL (ref 0.57–1)
ERYTHROCYTE [DISTWIDTH] IN BLOOD BY AUTOMATED COUNT: 13.6 % (ref 12.3–15.4)
GFR SERPLBLD CREATININE-BSD FMLA CKD-EPI: 100 ML/MIN/1.73
GFR SERPLBLD CREATININE-BSD FMLA CKD-EPI: 115 ML/MIN/1.73
GLOBULIN SER CALC-MCNC: 1.9 G/DL (ref 1.5–4.5)
GLUCOSE SERPL-MCNC: 92 MG/DL (ref 65–99)
HCT VFR BLD AUTO: 42.4 % (ref 34–46.6)
HGB BLD-MCNC: 13.7 G/DL (ref 11.1–15.9)
LIPASE SERPL-CCNC: 23 U/L (ref 14–72)
MCH RBC QN AUTO: 29.8 PG (ref 26.6–33)
MCHC RBC AUTO-ENTMCNC: 32.3 G/DL (ref 31.5–35.7)
MCV RBC AUTO: 92 FL (ref 79–97)
PLATELET # BLD AUTO: 244 X10E3/UL (ref 150–379)
POTASSIUM SERPL-SCNC: 4.2 MMOL/L (ref 3.5–5.2)
PROT SERPL-MCNC: 6.6 G/DL (ref 6–8.5)
RBC # BLD AUTO: 4.6 X10E6/UL (ref 3.77–5.28)
SODIUM SERPL-SCNC: 142 MMOL/L (ref 134–144)
WBC # BLD AUTO: 4.1 X10E3/UL (ref 3.4–10.8)

## 2018-05-14 ENCOUNTER — TELEPHONE (OUTPATIENT)
Dept: FAMILY MEDICINE CLINIC | Age: 53
End: 2018-05-14

## 2018-05-14 NOTE — TELEPHONE ENCOUNTER
----- Message from Radha Hook NP sent at 5/14/2018  7:43 AM EDT -----  Please call patient and let her know that her labs returned and are normal.  This is reassuring. Thanks!

## 2018-05-15 NOTE — TELEPHONE ENCOUNTER
Message forwarded from call center    Pt is returning a missed call to Jayden Grimaldo.  Best contact: [phone]

## 2018-05-24 ENCOUNTER — OFFICE VISIT (OUTPATIENT)
Dept: FAMILY MEDICINE CLINIC | Age: 53
End: 2018-05-24

## 2018-05-24 VITALS
HEART RATE: 80 BPM | TEMPERATURE: 98 F | SYSTOLIC BLOOD PRESSURE: 112 MMHG | WEIGHT: 133.6 LBS | OXYGEN SATURATION: 98 % | BODY MASS INDEX: 20.97 KG/M2 | DIASTOLIC BLOOD PRESSURE: 84 MMHG | RESPIRATION RATE: 16 BRPM | HEIGHT: 67 IN

## 2018-05-24 DIAGNOSIS — F41.9 ANXIETY: ICD-10-CM

## 2018-05-24 DIAGNOSIS — F41.0 PANIC DISORDER WITHOUT AGORAPHOBIA: ICD-10-CM

## 2018-05-24 RX ORDER — CLONAZEPAM 0.5 MG/1
0.5 TABLET ORAL
Qty: 20 TAB | Refills: 0 | Status: SHIPPED | OUTPATIENT
Start: 2018-05-24 | End: 2018-09-26 | Stop reason: SDUPTHER

## 2018-05-24 RX ORDER — ESTRADIOL 10 UG/1
10 INSERT VAGINAL
COMMUNITY
End: 2020-01-17

## 2018-05-24 RX ORDER — VENLAFAXINE HYDROCHLORIDE 75 MG/1
75 CAPSULE, EXTENDED RELEASE ORAL DAILY
Qty: 30 CAP | Refills: 2 | Status: SHIPPED | OUTPATIENT
Start: 2018-05-24 | End: 2018-09-26

## 2018-05-24 NOTE — PROGRESS NOTES
Chief Complaint   Patient presents with    Anxiety     pt states everything is building up again. pt weaned herself off of her Celexa and now things are building up again. \"REVIEWED RECORD IN PREPARATION FOR VISIT AND HAVE OBTAINED THE NECESSARY DOCUMENTATION\"  1. Have you been to the ER, urgent care clinic since your last visit? Hospitalized since your last visit? No    2. Have you seen or consulted any other health care providers outside of the [address] [place] [address] since your last visit? Include any pap smears or colon screening. No  Patient does not have advanced directives.

## 2018-05-24 NOTE — LETTER
NOTIFICATION RETURN TO WORK / SCHOOL 
 
5/24/2018 10:27 AM 
 
Ms. Soumya Lyman [address] [place] [phone] To Whom It May Concern: 
 
Soumya Lyman is currently under the care of [address] [place] [address]. She needs to be excused from work on 5/24 and 5/25, due to illness. If there are questions or concerns please have the patient contact our office. Sincerely, Lisa Sherman NP

## 2018-05-24 NOTE — LETTER
NOTIFICATION RETURN TO WORK / SCHOOL 
 
5/24/2018 10:32 AM 
 
Ms. Angelica Madsen [address] [facility] [phone] To Whom It May Concern: 
 
Angelcia Madsen is currently under the care of [address] [place] [address]. She needs to be excused from work on 5/24 and 5/25, due to illness. If there are questions or concerns please have the patient contact our office. Sincerely, Kaci Ortega NP

## 2018-05-24 NOTE — PATIENT INSTRUCTIONS
Anxiety Disorder: Care Instructions  Your Care Instructions    Anxiety is a normal reaction to stress. Difficult situations can cause you to have symptoms such as sweaty palms and a nervous feeling. In an anxiety disorder, the symptoms are far more severe. Constant worry, muscle tension, trouble sleeping, nausea and diarrhea, and other symptoms can make normal daily activities difficult or impossible. These symptoms may occur for no reason, and they can affect your work, school, or social life. Medicines, counseling, and self-care can all help. Follow-up care is a key part of your treatment and safety. Be sure to make and go to all appointments, and call your doctor if you are having problems. It's also a good idea to know your test results and keep a list of the medicines you take. How can you care for yourself at home? · Take medicines exactly as directed. Call your doctor if you think you are having a problem with your medicine. · Go to your counseling sessions and follow-up appointments. · Recognize and accept your anxiety. Then, when you are in a situation that makes you anxious, say to yourself, \"This is not an emergency. I feel uncomfortable, but I am not in danger. I can keep going even if I feel anxious. \"  · Be kind to your body:  ¨ Relieve tension with exercise or a massage. ¨ Get enough rest.  ¨ Avoid alcohol, caffeine, nicotine, and illegal drugs. They can increase your anxiety level and cause sleep problems. ¨ Learn and do relaxation techniques. See below for more about these techniques. · Engage your mind. Get out and do something you enjoy. Go to a funny movie, or take a walk or hike. Plan your day. Having too much or too little to do can make you anxious. · Keep a record of your symptoms. Discuss your fears with a good friend or family member, or join a support group for people with similar problems. Talking to others sometimes relieves stress.   · Get involved in social groups, or volunteer to help others. Being alone sometimes makes things seem worse than they are. · Get at least 30 minutes of exercise on most days of the week to relieve stress. Walking is a good choice. You also may want to do other activities, such as running, swimming, cycling, or playing tennis or team sports. Relaxation techniques  Do relaxation exercises 10 to 20 minutes a day. You can play soothing, relaxing music while you do them, if you wish. · Tell others in your house that you are going to do your relaxation exercises. Ask them not to disturb you. · Find a comfortable place, away from all distractions and noise. · Lie down on your back, or sit with your back straight. · Focus on your breathing. Make it slow and steady. · Breathe in through your nose. Breathe out through either your nose or mouth. · Breathe deeply, filling up the area between your navel and your rib cage. Breathe so that your belly goes up and down. · Do not hold your breath. · Breathe like this for 5 to 10 minutes. Notice the feeling of calmness throughout your whole body. As you continue to breathe slowly and deeply, relax by doing the following for another 5 to 10 minutes:  · Tighten and relax each muscle group in your body. You can begin at your toes and work your way up to your head. · Imagine your muscle groups relaxing and becoming heavy. · Empty your mind of all thoughts. · Let yourself relax more and more deeply. · Become aware of the state of calmness that surrounds you. · When your relaxation time is over, you can bring yourself back to alertness by moving your fingers and toes and then your hands and feet and then stretching and moving your entire body. Sometimes people fall asleep during relaxation, but they usually wake up shortly afterward. · Always give yourself time to return to full alertness before you drive a car or do anything that might cause an accident if you are not fully alert.  Never play a relaxation tape while you drive a car. When should you call for help? Call 911 anytime you think you may need emergency care. For example, call if:  ? · You feel you cannot stop from hurting yourself or someone else. ? Keep the numbers for these national suicide hotlines: 1-520-831-TALK (2-577.664.3091) and 0-538-IFXUSJW (3-949.796.2241). If you or someone you know talks about suicide or feeling hopeless, get help right away. ? Watch closely for changes in your health, and be sure to contact your doctor if:  ? · You have anxiety or fear that affects your life. ? · You have symptoms of anxiety that are new or different from those you had before. Where can you learn more? Go to http://tara-pat.info/. Enter P754 in the search box to learn more about \"Anxiety Disorder: Care Instructions. \"  Current as of: May 12, 2017  Content Version: 11.4  © 2748-5605 Healthwise, [employer]orporated. Care instructions adapted under license by [employer] (which disclaims liability or warranty for this information). If you have questions about a medical condition or this instruction, always ask your healthcare professional. [address] any warranty or liability for your use of this information.

## 2018-05-24 NOTE — MR AVS SNAPSHOT
[address] [facility] 
[phone] Patient: Soumya Lyman MRN: [id number] :1965 Visit Information Date & Time Provider Department Dept. Phone Encounter #  
 2018 10:00 AM Lisa Sherman  [address] [phone] [id number] Follow-up Instructions Return if symptoms worsen or fail to improve. Upcoming Health Maintenance Date Due Hepatitis C Screening 1965 PAP AKA CERVICAL CYTOLOGY 2015 BREAST CANCER SCRN MAMMOGRAM 10/22/2015 FOBT Q 1 YEAR AGE 50-75 10/22/2015 Influenza Age 5 to Adult 2018 DTaP/Tdap/Td series (2 - Td) 2020 Allergies as of 2018  Review Complete On: 2018 By: Lisa Sherman NP Severity Noted Reaction Type Reactions Naproxen  10/14/2015   Intolerance Other (comments) Abdominal Pain Current Immunizations  Reviewed on 2012 Name Date Hepatitis A Vaccine 2011 Hepatitis B Vaccine 2012, 10/17/2011, 2011 Influenza Vaccine Split 10/17/2011 TDAP Vaccine 2010 Not reviewed this visit You Were Diagnosed With   
  
 Codes Comments Anxiety     ICD-10-CM: F41.9 ICD-9-CM: 300.00 Panic disorder without agoraphobia     ICD-10-CM: F41.0 ICD-9-CM: 300.01 Vitals BP Pulse Temp Resp Height(growth percentile) Weight(growth percentile) 112/84 80 98 °F (36.7 °C) (Oral) 16 5' 7\" (1.702 m) 133 lb 9.6 oz (60.6 kg) LMP SpO2 BMI OB Status Smoking Status 2010 98% 20.92 kg/m2 Postmenopausal Never Smoker BMI and BSA Data Body Mass Index Body Surface Area  
 20.92 kg/m 2 1.69 m 2 Preferred Pharmacy Pharmacy Name Phone [address] [phone] Your Updated Medication List  
  
   
This list is accurate as of 18 10:27 AM.  Always use your most recent med list.  
  
  
 aspirin 81 mg chewable tablet Take 1 Tab by mouth daily. clonazePAM 0.5 mg tablet Commonly known as:  Michelle Chicas Take 1 Tab by mouth nightly as needed. Max Daily Amount: 0.5 mg. Indications: Panic Disorder  
  
 cyclobenzaprine 10 mg tablet Commonly known as:  FLEXERIL Take 1 Tab by mouth three (3) times daily as needed for Muscle Spasm(s). DEXILANT 60 mg Cpdb Generic drug:  Dexlansoprazole Take  by mouth. diclofenac EC 75 mg EC tablet Commonly known as:  VOLTAREN Take 1 Tab by mouth two (2) times a day. estradiol 10 mcg Tab vaginal tablet Commonly known as:  Milton Robles Insert 10 mcg into vagina daily. EVENING PRIMROSE OIL PO Take  by mouth daily. Flaxseed Oil Oil  
by Does Not Apply route. GINKOBA PO Take  by mouth. LYSINE PO Take  by mouth.  
  
 multivitamin tablet Commonly known as:  ONE A DAY Take 1 Tab by mouth daily. VALTREX 500 mg tablet Generic drug:  valACYclovir Take  by mouth as needed. venlafaxine-SR 75 mg capsule Commonly known as:  EFFEXOR-XR Take 1 Cap by mouth daily. Prescriptions Printed Refills  
 clonazePAM (KLONOPIN) 0.5 mg tablet 0 Sig: Take 1 Tab by mouth nightly as needed. Max Daily Amount: 0.5 mg. Indications: Panic Disorder Class: Print Route: Oral  
  
Prescriptions Sent to Pharmacy Refills  
 venlafaxine-SR (EFFEXOR-XR) 75 mg capsule 2 Sig: Take 1 Cap by mouth daily. Class: Normal  
 Pharmacy: [facility] DR SEAN PERES [address] [place] [address] #: [phone] Route: Oral  
  
Follow-up Instructions Return if symptoms worsen or fail to improve. Patient Instructions Anxiety Disorder: Care Instructions Your Care Instructions Anxiety is a normal reaction to stress. Difficult situations can cause you to have symptoms such as sweaty palms and a nervous feeling. In an anxiety disorder, the symptoms are far more severe. Constant worry, muscle tension, trouble sleeping, nausea and diarrhea, and other symptoms can make normal daily activities difficult or impossible. These symptoms may occur for no reason, and they can affect your work, school, or social life. Medicines, counseling, and self-care can all help. Follow-up care is a key part of your treatment and safety. Be sure to make and go to all appointments, and call your doctor if you are having problems. It's also a good idea to know your test results and keep a list of the medicines you take. How can you care for yourself at home? · Take medicines exactly as directed. Call your doctor if you think you are having a problem with your medicine. · Go to your counseling sessions and follow-up appointments. · Recognize and accept your anxiety. Then, when you are in a situation that makes you anxious, say to yourself, \"This is not an emergency. I feel uncomfortable, but I am not in danger. I can keep going even if I feel anxious. \" · Be kind to your body: ¨ Relieve tension with exercise or a massage. ¨ Get enough rest. 
¨ Avoid alcohol, caffeine, nicotine, and illegal drugs. They can increase your anxiety level and cause sleep problems. ¨ Learn and do relaxation techniques. See below for more about these techniques. · Engage your mind. Get out and do something you enjoy. Go to a funny movie, or take a walk or hike. Plan your day. Having too much or too little to do can make you anxious. · Keep a record of your symptoms. Discuss your fears with a good friend or family member, or join a support group for people with similar problems. Talking to others sometimes relieves stress. · Get involved in social groups, or volunteer to help others. Being alone sometimes makes things seem worse than they are.  
· Get at least 30 minutes of exercise on most days of the week to relieve stress. Walking is a good choice. You also may want to do other activities, such as running, swimming, cycling, or playing tennis or team sports. Relaxation techniques Do relaxation exercises 10 to 20 minutes a day. You can play soothing, relaxing music while you do them, if you wish. · Tell others in your house that you are going to do your relaxation exercises. Ask them not to disturb you. · Find a comfortable place, away from all distractions and noise. · Lie down on your back, or sit with your back straight. · Focus on your breathing. Make it slow and steady. · Breathe in through your nose. Breathe out through either your nose or mouth. · Breathe deeply, filling up the area between your navel and your rib cage. Breathe so that your belly goes up and down. · Do not hold your breath. · Breathe like this for 5 to 10 minutes. Notice the feeling of calmness throughout your whole body. As you continue to breathe slowly and deeply, relax by doing the following for another 5 to 10 minutes: · Tighten and relax each muscle group in your body. You can begin at your toes and work your way up to your head. · Imagine your muscle groups relaxing and becoming heavy. · Empty your mind of all thoughts. · Let yourself relax more and more deeply. · Become aware of the state of calmness that surrounds you. · When your relaxation time is over, you can bring yourself back to alertness by moving your fingers and toes and then your hands and feet and then stretching and moving your entire body. Sometimes people fall asleep during relaxation, but they usually wake up shortly afterward. · Always give yourself time to return to full alertness before you drive a car or do anything that might cause an accident if you are not fully alert. Never play a relaxation tape while you drive a car. When should you call for help? Call 911 anytime you think you may need emergency care. For example, call if: ? · You feel you cannot stop from hurting yourself or someone else. ? Keep the numbers for these national suicide hotlines: 0-799-070-TALK (6-936.490.7860) and 2-840-IUGLEBT (5-535.368.3939). If you or someone you know talks about suicide or feeling hopeless, get help right away. ? Watch closely for changes in your health, and be sure to contact your doctor if: 
? · You have anxiety or fear that affects your life. ? · You have symptoms of anxiety that are new or different from those you had before. Where can you learn more? Go to http://tara-pat.info/. Enter P754 in the search box to learn more about \"Anxiety Disorder: Care Instructions. \" Current as of: May 12, 2017 Content Version: 11.4 © 6423-3780 [employer]. Care instructions adapted under license by [employer] (which disclaims liability or warranty for this information). If you have questions about a medical condition or this instruction, always ask your healthcare professional. [address] any warranty or liability for your use of this information. Introducing [facility]! Dear Jenn Navarrete: 
Thank you for requesting a ReferralMD account. Our records indicate that you already have an active ReferralMD account. You can access your account anytime at [email or web address] Did you know that you can access your hospital and ER discharge instructions at any time in ReferralMD? You can also review all of your test results from your hospital stay or ER visit. Additional Information If you have questions, please visit the Frequently Asked Questions section of the ReferralMD website at [email or web address] Remember, ReferralMD is NOT to be used for urgent needs. For medical emergencies, dial 911. Now available from your iPhone and Android! Please provide this summary of care documentation to your next provider. Your primary care clinician is listed as Patrick Ramirez. If you have any questions after today's visit, please call [phone].

## 2018-05-24 NOTE — PROGRESS NOTES
HISTORY OF PRESENT ILLNESS  Eugenio Ny is a 46 y.o. female. HPI  Pt presents with \"anxiety\"    Pt states that 'she is very overwhelmed, and her anxiety is affecting her greatly\"  Pt states that she feels as though her anxiety is affecting her train of thought, her focus, her every day life. Pt states that she has put too much on her plate, and believes that this is what has caused the increase in anxiety. Pt has recently started a new job, and this is in addition to everything else that is going on in her current life. Pt was previously on Celexa, and did not feel like this was helping her anxiety so she weaned herself off of this medication. Pt denies any depressive symptoms, and denies any suicidal and/or homicidal ideations. Pt states that she did have to take her last Klonopin the other day as she was having a panic attack, and is wondering if she can get a refill of this as well. Pt states that she takes this very rarely, when she is in extreme anxiety. Review of Systems   Constitutional: Negative for fever. HENT: Negative for congestion. Respiratory: Negative for cough. Psychiatric/Behavioral: The patient is nervous/anxious. Physical Exam   Constitutional: She is oriented to person, place, and time. She appears well-developed and well-nourished. HENT:   Head: Normocephalic and atraumatic. Cardiovascular: Normal rate, regular rhythm and normal heart sounds. Pulmonary/Chest: Effort normal and breath sounds normal.   Neurological: She is alert and oriented to person, place, and time. Skin: Skin is warm and dry. Psychiatric: She has a normal mood and affect. Her behavior is normal.       ASSESSMENT and PLAN    ICD-10-CM ICD-9-CM    1. Anxiety F41.9 300.00 clonazePAM (KLONOPIN) 0.5 mg tablet      venlafaxine-SR (EFFEXOR-XR) 75 mg capsule   2.  Panic disorder without agoraphobia F41.0 300.01 clonazePAM (KLONOPIN) 0.5 mg tablet     Educated about Effexor, how this medication works, and common side effects. Educated about always calling 911 or going to the ER with ANY suicidal and/or homicidal ideations.  reviewed and appropriate, so Klonopin refilled. Educated about taking only as needed for panic attacks, and risks of taking this medication on a daily basis. Pt informed to return to office with worsening of symptoms, or PRN with any questions or concerns. Pt verbalizes understanding of plan of care and denies further questions or concerns at this time.

## 2018-07-02 ENCOUNTER — OFFICE VISIT (OUTPATIENT)
Dept: FAMILY MEDICINE CLINIC | Age: 53
End: 2018-07-02

## 2018-07-02 VITALS
RESPIRATION RATE: 16 BRPM | SYSTOLIC BLOOD PRESSURE: 130 MMHG | DIASTOLIC BLOOD PRESSURE: 88 MMHG | OXYGEN SATURATION: 100 % | TEMPERATURE: 97.9 F | HEART RATE: 68 BPM | HEIGHT: 67 IN

## 2018-07-02 DIAGNOSIS — M79.632 LEFT FOREARM PAIN: Primary | ICD-10-CM

## 2018-07-02 RX ORDER — CYCLOBENZAPRINE HCL 10 MG
10 TABLET ORAL
Qty: 21 TAB | Refills: 0 | Status: SHIPPED | OUTPATIENT
Start: 2018-07-02 | End: 2018-09-26

## 2018-07-02 RX ORDER — DICLOFENAC SODIUM 75 MG/1
75 TABLET, DELAYED RELEASE ORAL 2 TIMES DAILY
Qty: 30 TAB | Refills: 0 | Status: SHIPPED | OUTPATIENT
Start: 2018-07-02 | End: 2018-09-26

## 2018-07-02 NOTE — PROGRESS NOTES
HISTORY OF PRESENT ILLNESS  Diana Porter is a 46 y.o. female. HPI  Pt presents with \"arm pain\"    She was trimming a bush this morning, and her left forearm was hit by a branch. Pt states that it felt a little numb and tingling, when it first happened. She took some Ibuprofen, and has been elevating the arm, and it has improved some. Normal ROM of arm. Some tingling is still noted, and there is a deep, aching pain, in the forearm. Normal strength. Nothing else was hit by the branch. Review of Systems   Constitutional: Negative for fever. HENT: Negative for congestion. Respiratory: Negative for cough. Gastrointestinal: Negative for diarrhea and vomiting. Musculoskeletal: Positive for myalgias. Physical Exam   Constitutional: She is oriented to person, place, and time. She appears well-developed and well-nourished. HENT:   Head: Normocephalic and atraumatic. Cardiovascular: Normal rate, regular rhythm and normal heart sounds. Pulmonary/Chest: Effort normal and breath sounds normal.   Musculoskeletal:        Left forearm: She exhibits tenderness. She exhibits no bony tenderness, no swelling and no edema. Arms:  Neurological: She is alert and oriented to person, place, and time. She has normal strength. Skin: Skin is warm and dry. Psychiatric: She has a normal mood and affect. Her behavior is normal.       ASSESSMENT and PLAN    ICD-10-CM ICD-9-CM    1. Left forearm pain M79.632 729.5 diclofenac EC (VOLTAREN) 75 mg EC tablet      cyclobenzaprine (FLEXERIL) 10 mg tablet     Informed patient that I have sent medication to the pharmacy, and she should take as prescribed. Educated about notifying office or going to the ER with worsening of symptoms, swelling, bruising, decreased ROM, etc, for possible x-ray. Educated about [address] Pt informed to return to office with worsening of symptoms, or PRN with any questions or concerns.   Pt verbalizes understanding of plan of care and denies further questions or concerns at this time.

## 2018-07-02 NOTE — MR AVS SNAPSHOT
[facility] 
 
 
 [address] [facility] 
[phone] Patient: Michael Christianson MRN: [id number] :1965 Visit Information Date & Time Provider Department Dept. Phone Encounter #  
 2018 11:15 AM Sharath Amos  [facility] [phone] [id number] Follow-up Instructions Return if symptoms worsen or fail to improve. Upcoming Health Maintenance Date Due Hepatitis C Screening 1965 PAP AKA CERVICAL CYTOLOGY 2015 BREAST CANCER SCRN MAMMOGRAM 10/22/2015 FOBT Q 1 YEAR AGE 50-75 10/22/2015 Influenza Age 5 to Adult 2018 DTaP/Tdap/Td series (2 - Td) 2020 Allergies as of 2018  Review Complete On: 2018 By: Sharath Amos NP Severity Noted Reaction Type Reactions Naproxen  10/14/2015   Intolerance Other (comments) Abdominal Pain Current Immunizations  Reviewed on 2012 Name Date Hepatitis A Vaccine 2011 Hepatitis B Vaccine 2012, 10/17/2011, 2011 Influenza Vaccine Split 10/17/2011 TDAP Vaccine 2010 Not reviewed this visit You Were Diagnosed With   
  
 Codes Comments Left forearm pain    -  Primary ICD-10-CM: K89.712 ICD-9-CM: 729.5 Vitals BP Pulse Temp Resp Height(growth percentile) LMP  
 130/88 (BP 1 Location: Right arm, BP Patient Position: Sitting) 68 97.9 °F (36.6 °C) (Oral) 16 5' 7\" (1.702 m) 2010 SpO2 OB Status Smoking Status 100% Postmenopausal Never Smoker Preferred Pharmacy Pharmacy Name Phone [facility] [address] [place] [address] [phone] Your Updated Medication List  
  
   
This list is accurate as of 18 11:34 AM.  Always use your most recent med list.  
  
  
  
  
 aspirin 81 mg chewable tablet Take 1 Tab by mouth daily. clonazePAM 0.5 mg tablet Commonly known as:  Gilberto Cline  
 Take 1 Tab by mouth nightly as needed. Max Daily Amount: 0.5 mg. Indications: Panic Disorder  
  
 cyclobenzaprine 10 mg tablet Commonly known as:  FLEXERIL Take 1 Tab by mouth three (3) times daily as needed for Muscle Spasm(s). DEXILANT 60 mg Cpdb Generic drug:  Dexlansoprazole Take  by mouth. * diclofenac EC 75 mg EC tablet Commonly known as:  VOLTAREN Take 1 Tab by mouth two (2) times a day. * diclofenac EC 75 mg EC tablet Commonly known as:  VOLTAREN Take 1 Tab by mouth two (2) times a day. estradiol 10 mcg Tab vaginal tablet Commonly known as:  Mario Chapman Insert 10 mcg into vagina daily. EVENING PRIMROSE OIL PO Take  by mouth daily. Flaxseed Oil Oil  
by Does Not Apply route. GINKOBA PO Take  by mouth. LYSINE PO Take  by mouth.  
  
 multivitamin tablet Commonly known as:  ONE A DAY Take 1 Tab by mouth daily. VALTREX 500 mg tablet Generic drug:  valACYclovir Take  by mouth as needed. venlafaxine-SR 75 mg capsule Commonly known as:  EFFEXOR-XR Take 1 Cap by mouth daily. * Notice: This list has 2 medication(s) that are the same as other medications prescribed for you. Read the directions carefully, and ask your doctor or other care provider to review them with you. Prescriptions Sent to Pharmacy Refills  
 diclofenac EC (VOLTAREN) 75 mg EC tablet 0 Sig: Take 1 Tab by mouth two (2) times a day. Class: Normal  
 Pharmacy: [facility]  [address] [facility] Ph #: [phone] Route: Oral  
 cyclobenzaprine (FLEXERIL) 10 mg tablet 0 Sig: Take 1 Tab by mouth three (3) times daily as needed for Muscle Spasm(s). Class: Normal  
 Pharmacy: [facility]  [address] [facility] Ph #: [phone] Route: Oral  
  
Follow-up Instructions Return if symptoms worsen or fail to improve. Patient Instructions Arm Pain: Care Instructions Your Care Instructions You can hurt your arm by using it too much or by injuring it. Biking, wrestling, and home repair projects are examples of activities that can lead to arm pain. Everyday wear and tear, especially as you get older, can cause arm pain. Your forearms, wrists, hands, and fingers are the parts of your arm that are most likely to become painful. A minor arm injury usually will heal on its own with home treatment to relieve swelling and pain. If you have a more serious injury, you may need tests and treatment. Follow-up care is a key part of your treatment and safety. Be sure to make and go to all appointments, and call your doctor if you are having problems. It's also a good idea to know your test results and keep a list of the medicines you take. How can you care for yourself at home? · Take pain medicines exactly as directed. ¨ If the doctor gave you a prescription medicine for pain, take it as prescribed. ¨ If you are not taking a prescription pain medicine, ask your doctor if you can take an over-the-counter medicine. · Rest and protect your arm. Take a break from any activity that may cause pain. · Put ice or a cold pack on your arm for 10 to 20 minutes at a time. Put a thin cloth between the ice and your skin. · Prop up the sore arm on a pillow when you ice it or anytime you sit or lie down during the next 3 days. Try to keep it above the level of your heart. This will help reduce swelling. · If your doctor recommends a sling to support your arm, wear it as directed. When should you call for help? Call 911 anytime you think you may need emergency care. For example, call if: 
? · Your arm or hand is cool or pale or changes color. ?Call your doctor now or seek immediate medical care if: 
? · You cannot use your arm. ? · You have signs of infection, such as: 
¨ Increased pain, swelling, warmth, or redness. ¨ Red streaks running up or down your arm. ¨ Pus draining from an area of your arm. ¨ A fever. ? · You have tingling, weakness, or numbness in your arm. ? Watch closely for changes in your health, and be sure to contact your doctor if: 
? · You do not get better as expected. Where can you learn more? Go to http://tara-pat.info/. Enter B641 in the search box to learn more about \"Arm Pain: Care Instructions. \" Current as of: March 20, 2017 Content Version: 11.4 © 2400-2709 [employer]Drill[employer]. Care instructions adapted under license by [employer] (which disclaims liability or warranty for this information). If you have questions about a medical condition or this instruction, always ask your healthcare professional. [place] [address] any warranty or liability for your use of this information. Introducing [facility]! Dear Ban Quinteros: 
Thank you for requesting a Ganjiwang account. Our records indicate that you already have an active Ganjiwang account. You can access your account anytime at [email or web address]/Fish Nature Did you know that you can access your hospital and ER discharge instructions at any time in Ganjiwang? You can also review all of your test results from your hospital stay or ER visit. Additional Information If you have questions, please visit the Frequently Asked Questions section of the Ganjiwang website at [email or web address] [email or web address] Remember, Ganjiwang is NOT to be used for urgent needs. For medical emergencies, dial 911. Now available from your iPhone and Android! Please provide this summary of care documentation to your next provider. Your primary care clinician is listed as Mi Serra. If you have any questions after today's visit, please call [phone].

## 2018-07-02 NOTE — PATIENT INSTRUCTIONS
Arm Pain: Care Instructions  Your Care Instructions    You can hurt your arm by using it too much or by injuring it. Biking, wrestling, and home repair projects are examples of activities that can lead to arm pain. Everyday wear and tear, especially as you get older, can cause arm pain. Your forearms, wrists, hands, and fingers are the parts of your arm that are most likely to become painful. A minor arm injury usually will heal on its own with home treatment to relieve swelling and pain. If you have a more serious injury, you may need tests and treatment. Follow-up care is a key part of your treatment and safety. Be sure to make and go to all appointments, and call your doctor if you are having problems. It's also a good idea to know your test results and keep a list of the medicines you take. How can you care for yourself at home? · Take pain medicines exactly as directed. ¨ If the doctor gave you a prescription medicine for pain, take it as prescribed. ¨ If you are not taking a prescription pain medicine, ask your doctor if you can take an over-the-counter medicine. · Rest and protect your arm. Take a break from any activity that may cause pain. · Put ice or a cold pack on your arm for 10 to 20 minutes at a time. Put a thin cloth between the ice and your skin. · Prop up the sore arm on a pillow when you ice it or anytime you sit or lie down during the next 3 days. Try to keep it above the level of your heart. This will help reduce swelling. · If your doctor recommends a sling to support your arm, wear it as directed. When should you call for help? Call 911 anytime you think you may need emergency care. For example, call if:  ? · Your arm or hand is cool or pale or changes color. ?Call your doctor now or seek immediate medical care if:  ? · You cannot use your arm. ? · You have signs of infection, such as:  ¨ Increased pain, swelling, warmth, or redness.   ¨ Red streaks running up or down your arm.  ¨ Pus draining from an area of your arm. ¨ A fever. ? · You have tingling, weakness, or numbness in your arm. ? Watch closely for changes in your health, and be sure to contact your doctor if:  ? · You do not get better as expected. Where can you learn more? Go to http://tara-pat.info/. Enter B641 in the search box to learn more about \"Arm Pain: Care Instructions. \"  Current as of: March 20, 2017  Content Version: 11.4  © 4020-3457 [employer]eneSol. Care instructions adapted under license by [employer] (which disclaims liability or warranty for this information). If you have questions about a medical condition or this instruction, always ask your healthcare professional. [place] [address] any warranty or liability for your use of this information.

## 2018-07-02 NOTE — PROGRESS NOTES
Chief Complaint   Patient presents with    Arm Pain     pt was trimming tree branches and jammed her left arm and elbow this morning.  has put ice on it till came in office. \"REVIEWED RECORD IN PREPARATION FOR VISIT AND HAVE OBTAINED THE NECESSARY DOCUMENTATION\"  1. Have you been to the ER, urgent care clinic since your last visit? Hospitalized since your last visit? No    2. Have you seen or consulted any other health care providers outside of the [address] [place] [address] since your last visit? Include any pap smears or colon screening. No  Patient does not have advanced directives.

## 2018-09-17 ENCOUNTER — TELEPHONE (OUTPATIENT)
Dept: FAMILY MEDICINE CLINIC | Age: 53
End: 2018-09-17

## 2018-09-17 NOTE — TELEPHONE ENCOUNTER
Patient is calling and asking if a script for the shingles shot can be sent over to [facility].

## 2018-09-18 NOTE — TELEPHONE ENCOUNTER
LM to clarify pt's request as initial message was requesting script for shingles vaccine which I returned call to pt and advised Karena Martinez said the shingles vaccine is not indicated until age 61. When pt returned call after getting my message the message I rec'd is she would still like to receive the flu shot. I have asked her to callback to clarify if it is the flu vaccine she is looking to get (which can be done here) or the shingles vaccine, which is not in fact indicated until age 61.

## 2018-09-18 NOTE — TELEPHONE ENCOUNTER
Message forwarded from call center    Pt is returning a call to [address] [place] [address] nurse. Pt stated based off of previous conversations she would still like to receive the flu shot. No answer from practice to schedule.  Pt contact [phone]

## 2018-09-19 NOTE — TELEPHONE ENCOUNTER
Patient called back and stated that it is the shingles shot that she wants and she is now aware that insurance does not cover it but she wants to get it anyway. She is asking to [facility].   Any questions she can be reached at [phone]

## 2018-09-19 NOTE — TELEPHONE ENCOUNTER
It is not indicated per the CDC until 60, so I am not willing to order this. It is contraindicated at her age.   Thanks

## 2018-09-25 ENCOUNTER — TELEPHONE (OUTPATIENT)
Dept: FAMILY MEDICINE CLINIC | Age: 53
End: 2018-09-25

## 2018-09-25 DIAGNOSIS — Z23 NEED FOR SHINGLES VACCINE: Primary | ICD-10-CM

## 2018-09-25 NOTE — TELEPHONE ENCOUNTER
Please call patient and let her know that I have sent [facility]ingrix Rx to [facility]! I was going off of previous Shingles vaccine recommendations when we discussed this earlier, and the new shingles vaccine, Singrix, is indicated at 48. Sorry for so much back and forth! Thanks!

## 2018-09-26 ENCOUNTER — HOSPITAL ENCOUNTER (OUTPATIENT)
Dept: GENERAL RADIOLOGY | Age: 53
Discharge: HOME | End: 2018-09-26
Attending: INTERNAL MEDICINE
Payer: COMMERCIAL

## 2018-09-26 ENCOUNTER — OFFICE VISIT (OUTPATIENT)
Dept: FAMILY MEDICINE CLINIC | Age: 53
End: 2018-09-26

## 2018-09-26 VITALS
HEIGHT: 67 IN | DIASTOLIC BLOOD PRESSURE: 70 MMHG | HEART RATE: 67 BPM | TEMPERATURE: 97.8 F | SYSTOLIC BLOOD PRESSURE: 112 MMHG | BODY MASS INDEX: 21.09 KG/M2 | RESPIRATION RATE: 20 BRPM | OXYGEN SATURATION: 98 % | WEIGHT: 134.4 LBS

## 2018-09-26 DIAGNOSIS — R07.89 ANTERIOR CHEST WALL PAIN: Primary | ICD-10-CM

## 2018-09-26 DIAGNOSIS — F41.0 PANIC DISORDER WITHOUT AGORAPHOBIA: ICD-10-CM

## 2018-09-26 DIAGNOSIS — F41.9 ANXIETY: ICD-10-CM

## 2018-09-26 DIAGNOSIS — R07.89 ANTERIOR CHEST WALL PAIN: ICD-10-CM

## 2018-09-26 PROCEDURE — 71111 X-RAY EXAM RIBS/CHEST4/> VWS: CPT

## 2018-09-26 RX ORDER — CLONAZEPAM 0.5 MG/1
0.5 TABLET ORAL
Qty: 20 TAB | Refills: 0 | Status: SHIPPED | OUTPATIENT
Start: 2018-09-26 | End: 2019-03-01 | Stop reason: SDUPTHER

## 2018-09-26 NOTE — PATIENT INSTRUCTIONS
Costochondritis: Care Instructions Your Care Instructions You have chest pain because the cartilage of your rib cage is inflamed. This problem is called costochondritis. This type of chest wall pain may last from days to weeks. It is not a heart problem. Sometimes costochondritis occurs with a cold or the flu, and other times the exact cause is not known. Follow-up care is a key part of your treatment and safety. Be sure to make and go to all appointments, and call your doctor if you are having problems. It's also a good idea to know your test results and keep a list of the medicines you take. How can you care for yourself at home? · Take medicines for pain and inflammation exactly as directed. ¨ If the doctor gave you a prescription medicine, take it as prescribed. ¨ If you are not taking a prescription pain medicine, ask your doctor if you can take an over-the-counter medicine. ¨ Do not take two or more pain medicines at the same time unless the doctor told you to. Many pain medicines have acetaminophen, which is Tylenol. Too much acetaminophen (Tylenol) can be harmful. · It may help to use a warm compress or heating pad (set on low) on your chest. You can also try alternating heat and ice. Put ice or a cold pack on the area for 10 to 20 minutes at a time. Put a thin cloth between the ice and your skin. · Avoid any activity that strains the chest area. As your pain gets better, you can slowly return to your normal activities. · Do not use tape, an elastic bandage, a \"rib belt,\" or anything else that restricts your chest wall motion. When should you call for help? Call 911 anytime you think you may need emergency care. For example, call if: 
  · You have new or different chest pain or pressure. This may occur with: ¨ Sweating. ¨ Shortness of breath. ¨ Nausea or vomiting. ¨ Pain that spreads from the chest to the neck, jaw, or one or both shoulders or arms. ¨ Dizziness or lightheadedness. ¨ A fast or uneven pulse. After calling 911, chew 1 adult-strength aspirin. Wait for an ambulance. Do not try to drive yourself.  
  · You have severe trouble breathing.  
 Call your doctor now or seek immediate medical care if: 
  · You have a fever or cough.  
  · You have any trouble breathing.  
  · Your chest pain gets worse.  
 Watch closely for changes in your health, and be sure to contact your doctor if: 
  · Your chest pain continues even though you are taking anti-inflammatory medicine.  
  · Your chest wall pain has not improved after 5 to 7 days. Where can you learn more? Go to http://tara-pat.info/. Enter W542 in the search box to learn more about \"Costochondritis: Care Instructions. \" Current as of: November 20, 2017 Content Version: 11.7 © 6527-1651 [employer]. Care instructions adapted under license by [employer] (which disclaims liability or warranty for this information). If you have questions about a medical condition or this instruction, always ask your healthcare professional. [place] [address] any warranty or liability for your use of this information.

## 2018-09-26 NOTE — MR AVS SNAPSHOT
[facility] 
 
 
 [address] [facility] 
[phone] Patient: Markus Queen MRN: [id number] :1965 Visit Information Date & Time Provider Department Dept. Phone Encounter #  
 2018  4:00 PM Narciso Burrows [phone] [id number] Upcoming Health Maintenance Date Due Hepatitis C Screening 1965 PAP AKA CERVICAL CYTOLOGY 2015 Shingrix Vaccine Age 50> (1 of 2) 10/22/2015 BREAST CANCER SCRN MAMMOGRAM 10/22/2015 FOBT Q 1 YEAR AGE 50-75 10/22/2015 Influenza Age 5 to Adult 2018 DTaP/Tdap/Td series (2 - Td) 2020 Allergies as of 2018  Review Complete On: 2018 By: Marta Aj LPN Severity Noted Reaction Type Reactions Naproxen  10/14/2015   Intolerance Other (comments) Abdominal Pain Current Immunizations  Reviewed on 2012 Name Date Hepatitis A Vaccine 2011 Hepatitis B Vaccine 2012, 10/17/2011, 2011 Influenza Vaccine Split 10/17/2011 TDAP Vaccine 2010 Not reviewed this visit You Were Diagnosed With   
  
 Codes Comments Anterior chest wall pain    -  Primary ICD-10-CM: R07.89 ICD-9-CM: 786.52 Anxiety     ICD-10-CM: F41.9 ICD-9-CM: 300.00 Panic disorder without agoraphobia     ICD-10-CM: F41.0 ICD-9-CM: 300.01 Vitals BP Pulse Temp Resp Height(growth percentile) Weight(growth percentile) 112/70 (BP 1 Location: Left arm, BP Patient Position: Sitting) 67 97.8 °F (36.6 °C) (Oral) 20 5' 7\" (1.702 m) 134 lb 6.4 oz (61 kg) LMP SpO2 BMI OB Status Smoking Status 2010 98% 21.05 kg/m2 Postmenopausal Never Smoker BMI and BSA Data Body Mass Index Body Surface Area 21.05 kg/m 2 1.7 m 2 Preferred Pharmacy Pharmacy Name Phone [id number] [address]  [phone] Your Updated Medication List  
  
   
This list is accurate as of 9/26/18  4:46 PM.  Always use your most recent med list.  
  
  
  
  
 aspirin 81 mg chewable tablet Take 1 Tab by mouth daily. clonazePAM 0.5 mg tablet Commonly known as:  Maeve Reamer Take 1 Tab by mouth nightly as needed. Max Daily Amount: 0.5 mg. Indications: Panic Disorder DEXILANT 60 mg Cpdb Generic drug:  Dexlansoprazole Take  by mouth.  
  
 estradiol 10 mcg Tab vaginal tablet Commonly known as:  Merwin Lesch Insert 10 mcg into vagina daily. EVENING PRIMROSE OIL PO Take  by mouth daily. Flaxseed Oil Oil  
by Does Not Apply route. GINKOBA PO Take  by mouth. LYSINE PO Take  by mouth.  
  
 multivitamin tablet Commonly known as:  ONE A DAY Take 1 Tab by mouth daily. VALTREX 500 mg tablet Generic drug:  valACYclovir Take  by mouth as needed. Prescriptions Printed Refills  
 clonazePAM (KLONOPIN) 0.5 mg tablet 0 Sig: Take 1 Tab by mouth nightly as needed. Max Daily Amount: 0.5 mg. Indications: Panic Disorder Class: Print Route: Oral  
  
To-Do List   
 09/26/2018 Imaging:  XR RIBS BI W PA CHEST 4 VS   
  
  
Patient Instructions Costochondritis: Care Instructions Your Care Instructions You have chest pain because the cartilage of your rib cage is inflamed. This problem is called costochondritis. This type of chest wall pain may last from days to weeks. It is not a heart problem. Sometimes costochondritis occurs with a cold or the flu, and other times the exact cause is not known. Follow-up care is a key part of your treatment and safety. Be sure to make and go to all appointments, and call your doctor if you are having problems. It's also a good idea to know your test results and keep a list of the medicines you take. How can you care for yourself at home? · Take medicines for pain and inflammation exactly as directed. ¨ If the doctor gave you a prescription medicine, take it as prescribed. ¨ If you are not taking a prescription pain medicine, ask your doctor if you can take an over-the-counter medicine. ¨ Do not take two or more pain medicines at the same time unless the doctor told you to. Many pain medicines have acetaminophen, which is Tylenol. Too much acetaminophen (Tylenol) can be harmful. · It may help to use a warm compress or heating pad (set on low) on your chest. You can also try alternating heat and ice. Put ice or a cold pack on the area for 10 to 20 minutes at a time. Put a thin cloth between the ice and your skin. · Avoid any activity that strains the chest area. As your pain gets better, you can slowly return to your normal activities. · Do not use tape, an elastic bandage, a \"rib belt,\" or anything else that restricts your chest wall motion. When should you call for help? Call 911 anytime you think you may need emergency care. For example, call if: 
  · You have new or different chest pain or pressure. This may occur with: ¨ Sweating. ¨ Shortness of breath. ¨ Nausea or vomiting. ¨ Pain that spreads from the chest to the neck, jaw, or one or both shoulders or arms. ¨ Dizziness or lightheadedness. ¨ A fast or uneven pulse. After calling 911, chew 1 adult-strength aspirin. Wait for an ambulance. Do not try to drive yourself.  
  · You have severe trouble breathing.  
 Call your doctor now or seek immediate medical care if: 
  · You have a fever or cough.  
  · You have any trouble breathing.  
  · Your chest pain gets worse.  
 Watch closely for changes in your health, and be sure to contact your doctor if: 
  · Your chest pain continues even though you are taking anti-inflammatory medicine.  
  · Your chest wall pain has not improved after 5 to 7 days. Where can you learn more? Go to http://tara-pat.info/. Enter Q614 in the search box to learn more about \"Costochondritis: Care Instructions. \" Current as of: November 20, 2017 Content Version: 11.7 © 8987-4332 [employer]. Care instructions adapted under license by [employer] (which disclaims liability or warranty for this information). If you have questions about a medical condition or this instruction, always ask your healthcare professional. [address] any warranty or liability for your use of this information. Introducing hospitals & HEALTH SERVICES! Dear Karen Bermudez: 
Thank you for requesting a [employer] account. Our records indicate that you already have an active RevTra[employer] account. You can access your account anytime at [email or web address] Did you know that you can access your hospital and ER discharge instructions at any time in Rev[employer]? You can also review all of your test results from your hospital stay or ER visit. Additional Information If you have questions, please visit the Frequently Asked Questions section of the Rev[employer]x website at [email or web address] Remember, Rev[employer]x is NOT to be used for urgent needs. For medical emergencies, dial 911. Now available from your iPhone and Android! Please provide this summary of care documentation to your next provider. Your primary care clinician is listed as Layne Butcher. If you have any questions after today's visit, please call [phone].

## 2018-09-26 NOTE — PROGRESS NOTES
Identified pt with two pt identifiers(name and ). Chief Complaint Patient presents with  Back Pain Complains of pain radiating to back from breast bone area. Describes as dull Health Maintenance Due Topic  Hepatitis C Screening  PAP AKA CERVICAL CYTOLOGY  Shingrix Vaccine Age 50> (1 of 2)  BREAST CANCER SCRN MAMMOGRAM   
 FOBT Q 1 YEAR AGE 50-75  Influenza Age 5 to Adult Wt Readings from Last 3 Encounters:  
18 133 lb 9.6 oz (60.6 kg) 18 132 lb (59.9 kg) 17 133 lb (60.3 kg) Temp Readings from Last 3 Encounters:  
18 97.9 °F (36.6 °C) (Oral) 18 98 °F (36.7 °C) (Oral) 18 98 °F (36.7 °C) (Oral) BP Readings from Last 3 Encounters:  
18 130/88  
18 112/84  
18 115/79 Pulse Readings from Last 3 Encounters:  
18 68  
18 80  
18 64 Learning Assessment: 
:  
 
Learning Assessment 10/14/2015 3/12/2014 PRIMARY LEARNER Patient Patient HIGHEST LEVEL OF EDUCATION - PRIMARY LEARNER  - SOME COLLEGE  
BARRIERS PRIMARY LEARNER NONE NONE  
CO-LEARNER CAREGIVER - No  
PRIMARY LANGUAGE ENGLISH ENGLISH  
LEARNER PREFERENCE PRIMARY READING READING  
ANSWERED BY Jeana patient RELATIONSHIP SELF SELF Depression Screening: 
:  
 
PHQ over the last two weeks 2018 Little interest or pleasure in doing things Not at all Feeling down, depressed, irritable, or hopeless Not at all Total Score PHQ 2 0 Fall Risk Assessment: 
:  
 
No flowsheet data found. Abuse Screening: 
:  
 
Abuse Screening Questionnaire 3/12/2014 Do you ever feel afraid of your partner? Yohan Guy Are you in a relationship with someone who physically or mentally threatens you? Yohan Guy Is it safe for you to go home? Mady Deleon Coordination of Care Questionnaire: 
:  
 
1) Have you been to an emergency room, urgent care clinic since your last visit? no  
Hospitalized since your last visit? no          
 
 2) Have you seen or consulted any other health care providers outside of [address] [place] [address] since your last visit? no  (Include any pap smears or colon screenings in this section.) 3) Do you have an Advance Directive on file? no 
Are you interested in receiving information about Advance Directives? no 
 
Reviewed record in preparation for visit and have obtained necessary documentation. Medication reconciliation up to date and corrected with patient at this time.

## 2018-09-27 NOTE — PROGRESS NOTES
I spoke with the patient at lengths. Her CXR showed that she has diffusely deminieralized bones. She reported that her OB/GYN did a bone density test last year and told her it was \"OK\": I reviewed the test and it showed that she has osteopenia and that her T score was > -1.5 and treatment was indicated. She will make an appointment to discuss this and given that she is in early menopause (started 8-years ago), we will discuss starting her on a Bisphosphonate and giving her all the relevant information. She will make an appointment for follow up. In addition, she can use warm compresses and Advil (which she has tolerated) with food for the discomfort. More than likely, it is a costochondritis and also influenced by her osteopenia. Pt verbalizes understanding of plan of care and denies further questions or concerns at this time.     Mika Reese MD

## 2018-09-28 ENCOUNTER — OFFICE VISIT (OUTPATIENT)
Dept: FAMILY MEDICINE CLINIC | Age: 53
End: 2018-09-28

## 2018-09-28 VITALS
SYSTOLIC BLOOD PRESSURE: 112 MMHG | OXYGEN SATURATION: 99 % | BODY MASS INDEX: 21.03 KG/M2 | WEIGHT: 134 LBS | TEMPERATURE: 98 F | RESPIRATION RATE: 20 BRPM | DIASTOLIC BLOOD PRESSURE: 70 MMHG | HEART RATE: 76 BPM | HEIGHT: 67 IN

## 2018-09-28 DIAGNOSIS — M85.89 OSTEOPENIA OF MULTIPLE SITES: Primary | ICD-10-CM

## 2018-09-28 RX ORDER — ALENDRONATE SODIUM 70 MG/1
70 TABLET ORAL
Qty: 12 TAB | Refills: 3 | Status: SHIPPED | OUTPATIENT
Start: 2018-09-28 | End: 2018-10-01 | Stop reason: SDUPTHER

## 2018-09-28 NOTE — PATIENT INSTRUCTIONS
Learning About Osteopenia  What is osteopenia? Osteopenia is a decrease in thickness, or density, in bones. That means the bones become thinner and weaker. It is much more common in women than in men. It is an early form of osteoporosis, a condition in which the bones are so thin and weak that they can break easily. It's important to know that osteopenia is not a disease. It can happen normally with aging. Having osteopenia means that there is a greater risk that you may get osteoporosis. It also means that you are more likely to break a bone than someone who does not have osteopenia. But not everyone with osteopenia gets osteoporosis or breaks a bone. Osteopenia doesn't cause any symptoms. It's usually found with a type of X-ray called a bone density test. Osteopenia means that your bone density result (T-score) is between -1.0 and -2.5. What increases the risk for osteopenia? Things that increase your risk include:  · Having a family history of osteoporosis. · Being thin. · Being white or . · Getting too little physical activity. · Smoking. · Drinking too much alcohol often. · Using certain medicines such as steroids. How can you prevent osteoporosis? There are things you can do to slow down osteopenia and prevent osteoporosis. Certain lifestyle changes will help slow the loss of bone density. · Eat food that has plenty of calcium and vitamin D. Yogurt, cheese, milk, and dark green vegetables are high in calcium. Eggs, fatty fish, cereal, and fortified milk are high in vitamin D.  · Talk to your doctor about taking a calcium supplement that has vitamin D in it. · Get regular exercise. ¨ Do 30 minutes of weight-bearing exercise on most days of the week. Walking, jogging, stair climbing, and dancing are good choices. ¨ Do resistance exercises with weights or elastic bands 2 or 3 days a week. · Limit alcohol to 2 drinks a day for men and 1 drink a day for women.  Too much alcohol can cause health problems. · Do not smoke. Smoking can make bones thin faster. If you need help quitting, talk to your doctor about stop-smoking programs and medicines. These can increase your chances of quitting for good. Prescription medicines are available for treating bone thinning. But these are more often used to treat osteoporosis. Follow-up care is a key part of your treatment and safety. Be sure to make and go to all appointments, and call your doctor if you are having problems. It's also a good idea to know your test results and keep a list of the medicines you take. Where can you learn more? Go to http://tara-pat.info/. Enter J042 in the search box to learn more about \"Learning About Osteopenia. \"  Current as of: May 17, 2017  Content Version: 11.7  © 9214-0923 [employer]. Care instructions adapted under license by [employer] (which disclaims liability or warranty for this information). If you have questions about a medical condition or this instruction, always ask your healthcare professional. [address] any warranty or liability for your use of this information. OSTEOPENIA RECOMMENDATIONS:     - Vitamin D = 2242-1844 IU/day   - Calcium = 600 mg/day   - Gentle weight bearing exercises   - Bone density screening every 2-years. A pharmacist should be able to show you the best combination of these drugs. We will discuss this more after your follow up visit.

## 2018-09-28 NOTE — MR AVS SNAPSHOT
[facility] 
 
 
 [facility] [address] [facility] 
[phone] Patient: Purnima Trevino MRN: [id number] :1965 Visit Information Date & Time Provider Department Dept. Phone Encounter #  
 2018  3:45 PM Narciso Bhagat [phone] Follow-up Instructions Return if symptoms worsen or fail to improve. Upcoming Health Maintenance Date Due Hepatitis C Screening 1965 PAP AKA CERVICAL CYTOLOGY 2015 Shingrix Vaccine Age 50> (1 of 2) 10/22/2015 BREAST CANCER SCRN MAMMOGRAM 10/22/2015 FOBT Q 1 YEAR AGE 50-75 10/22/2015 Influenza Age 5 to Adult 2018 DTaP/Tdap/Td series (2 - Td) 2020 Allergies as of 2018  Review Complete On: 2018 By: Leela Miller MD  
  
 Severity Noted Reaction Type Reactions Naproxen  10/14/2015   Intolerance Other (comments) Abdominal Pain Current Immunizations  Reviewed on 2012 Name Date Hepatitis A Vaccine 2011 Hepatitis B Vaccine 2012, 10/17/2011, 2011 Influenza Vaccine Split 10/17/2011 TDAP Vaccine 2010 Not reviewed this visit You Were Diagnosed With   
  
 Codes Comments Osteopenia of multiple sites    -  Primary ICD-10-CM: M85.89 ICD-9-CM: 733.90 Vitals LMP OB Status Smoking Status 2010 Postmenopausal Never Smoker Preferred Pharmacy Pharmacy Name Phone [facility] [address] [place] [address] [phone] Your Updated Medication List  
  
   
This list is accurate as of 18  4:15 PM.  Always use your most recent med list.  
  
  
  
  
 alendronate 70 mg tablet Commonly known as:  FOSAMAX Take 1 Tab by mouth every seven (7) days for 90 days. aspirin 81 mg chewable tablet Take 1 Tab by mouth daily. clonazePAM 0.5 mg tablet Commonly known as:  Rhetta Johny  
 Take 1 Tab by mouth nightly as needed. Max Daily Amount: 0.5 mg. Indications: Panic Disorder DEXILANT 60 mg Cpdb Generic drug:  Dexlansoprazole Take  by mouth.  
  
 estradiol 10 mcg Tab vaginal tablet Commonly known as:  Beuford Bookbinder Insert 10 mcg into vagina daily. EVENING PRIMROSE OIL PO Take  by mouth daily. Flaxseed Oil Oil  
by Does Not Apply route. GINKOBA PO Take  by mouth. LYSINE PO Take  by mouth.  
  
 multivitamin tablet Commonly known as:  ONE A DAY Take 1 Tab by mouth daily. VALTREX 500 mg tablet Generic drug:  valACYclovir Take  by mouth as needed. Prescriptions Sent to Pharmacy Refills  
 alendronate (FOSAMAX) 70 mg tablet 3 Sig: Take 1 Tab by mouth every seven (7) days for 90 days. Class: Normal  
 Pharmacy: [address] #: [phone] Route: Oral  
  
Follow-up Instructions Return if symptoms worsen or fail to improve. Patient Instructions Learning About Osteopenia What is osteopenia? Osteopenia is a decrease in thickness, or density, in bones. That means the bones become thinner and weaker. It is much more common in women than in men. It is an early form of osteoporosis, a condition in which the bones are so thin and weak that they can break easily. It's important to know that osteopenia is not a disease. It can happen normally with aging. Having osteopenia means that there is a greater risk that you may get osteoporosis. It also means that you are more likely to break a bone than someone who does not have osteopenia. But not everyone with osteopenia gets osteoporosis or breaks a bone. Osteopenia doesn't cause any symptoms. It's usually found with a type of X-ray called a bone density test. Osteopenia means that your bone density result (T-score) is between -1.0 and -2.5. What increases the risk for osteopenia? Things that increase your risk include: 
· Having a family history of osteoporosis. · Being thin. · Being white or . · Getting too little physical activity. · Smoking. · Drinking too much alcohol often. · Using certain medicines such as steroids. How can you prevent osteoporosis? There are things you can do to slow down osteopenia and prevent osteoporosis. Certain lifestyle changes will help slow the loss of bone density. · Eat food that has plenty of calcium and vitamin D. Yogurt, cheese, milk, and dark green vegetables are high in calcium. Eggs, fatty fish, cereal, and fortified milk are high in vitamin D. 
· Talk to your doctor about taking a calcium supplement that has vitamin D in it. · Get regular exercise. ¨ Do 30 minutes of weight-bearing exercise on most days of the week. Walking, jogging, stair climbing, and dancing are good choices. ¨ Do resistance exercises with weights or elastic bands 2 or 3 days a week. · Limit alcohol to 2 drinks a day for men and 1 drink a day for women. Too much alcohol can cause health problems. · Do not smoke. Smoking can make bones thin faster. If you need help quitting, talk to your doctor about stop-smoking programs and medicines. These can increase your chances of quitting for good. Prescription medicines are available for treating bone thinning. But these are more often used to treat osteoporosis. Follow-up care is a key part of your treatment and safety. Be sure to make and go to all appointments, and call your doctor if you are having problems. It's also a good idea to know your test results and keep a list of the medicines you take. Where can you learn more? Go to http://tara-pat.info/. Enter O900 in the search box to learn more about \"Learning About Osteopenia. \" Current as of: May 17, 2017 Content Version: 11.7 © 9583-1973 [employer].  Care instructions adapted under license by Mirlande Bernstein (which disclaims liability or warranty for this information). If you have questions about a medical condition or this instruction, always ask your healthcare professional. [address] any warranty or liability for your use of this information. OSTEOPENIA RECOMMENDATIONS: 
 
 - Vitamin D = 3954-4746 IU/day 
 - Calcium = 600 mg/day 
 - Gentle weight bearing exercises - Bone density screening every 2-years. A pharmacist should be able to show you the best combination of these drugs. We will discuss this more after your follow up visit. Introducing [facility] [place] & HEALTH SERVICES! Dear Sakina Cruz: 
Thank you for requesting a [employer] account. Our records indicate that you already have an active [employer] account. You can access your account anytime at [email or web address] Did you know that you can access your hospital and ER discharge instructions at any time in [employer]? You can also review all of your test results from your hospital stay or ER visit. Additional Information If you have questions, please visit the Frequently Asked Questions section of the [employer] website at [email or web address] Remember, [employer] is NOT to be used for urgent needs. For medical emergencies, dial 911. Now available from your iPhone and Android! Please provide this summary of care documentation to your next provider. Your primary care clinician is listed as Layne Butcher. If you have any questions after today's visit, please call [phone].

## 2018-09-28 NOTE — PROGRESS NOTES
Chief Complaint   Patient presents with    Osteopenia     Follow up on Bone density and recent cxr. She is a 46 y.o. female who presents to discuss recent CXR and review of her bone density done last year with [facility] which showed osteopenia. Recent CXR showed diffuse demineralization and this prompted a review of her record. She had not previously been told about the bone density results. We discussed that since she is post surgical menopause for the past 8-years and has significant osteopenia and still very young, it might be best to start her on a bisphosphonate in addition to vitamin D and calcium. She will only be on the bisphosphonate x 5 years and we will recheck her bone density every 2-3 years. Reviewed PmHx, RxHx, FmHx, SocHx, AllgHx and updated and dated in the chart. Patient Active Problem List    Diagnosis    Burning sensation of feet    Screening for thyroid disorder    TIA (transient ischemic attack)    Paresthesia of left arm    Chronic neck pain    Anxiety    Injury of elbow, left    Swelling of joint, elbow, left    GERD (gastroesophageal reflux disease)    Unspecified hypothyroidism    Panic disorder without agoraphobia       Review of Systems - negative except as listed above in the HPI    Objective:     Vitals:    09/28/18 1619   BP: 112/70   Pulse: 76   Resp: 20   Temp: 98 °F (36.7 °C)   SpO2: 99%   Weight: 134 lb (60.8 kg)   Height: 5' 7\" (1.702 m)     Physical Examination:   General appearance - alert, well appearing, and in no distress  Back exam - full range of motion, no tenderness, palpable spasm or pain on motion  Neurological - alert, oriented, normal speech, no focal findings or movement disorder noted  Musculoskeletal - no joint tenderness, deformity or swelling  Extremities - peripheral pulses normal, no pedal edema, no clubbing or cyanosis    Assessment/ Plan:   Diagnoses and all orders for this visit:    1.  Osteopenia of multiple sites  - alendronate (FOSAMAX) 70 mg tablet; Take 1 Tab by mouth every seven (7) days for 90 days. OSTEOPENIA RECOMMENDATIONS:     - Vitamin D = 6569-2782 IU/day   - Calcium = 600 mg/day   - Gentle weight bearing exercises   - Bone density screening every 2-years. A pharmacist should be able to show you the best combination of these drugs. We will discuss this more after your follow up visit. Follow-up Disposition:  Return if symptoms worsen or fail to improve. I have discussed the diagnosis with the patient and the intended treatment plan as seen in the above orders. The patient has received an after-visit summary and questions were answered concerning future plans. Asked to return should symptoms worsen or not improve with treatment. Any pending labs and studies will be relayed to patient when they become available. Pt verbalizes understanding of plan of care and denies further questions or concerns at this time. Rickie Matamoros MD  Patient Instructions        Learning About Osteopenia  What is osteopenia? Osteopenia is a decrease in thickness, or density, in bones. That means the bones become thinner and weaker. It is much more common in women than in men. It is an early form of osteoporosis, a condition in which the bones are so thin and weak that they can break easily. It's important to know that osteopenia is not a disease. It can happen normally with aging. Having osteopenia means that there is a greater risk that you may get osteoporosis. It also means that you are more likely to break a bone than someone who does not have osteopenia. But not everyone with osteopenia gets osteoporosis or breaks a bone. Osteopenia doesn't cause any symptoms. It's usually found with a type of X-ray called a bone density test. Osteopenia means that your bone density result (T-score) is between -1.0 and -2.5. What increases the risk for osteopenia?   Things that increase your risk include:  · Having a family history of osteoporosis. · Being thin. · Being white or . · Getting too little physical activity. · Smoking. · Drinking too much alcohol often. · Using certain medicines such as steroids. How can you prevent osteoporosis? There are things you can do to slow down osteopenia and prevent osteoporosis. Certain lifestyle changes will help slow the loss of bone density. · Eat food that has plenty of calcium and vitamin D. Yogurt, cheese, milk, and dark green vegetables are high in calcium. Eggs, fatty fish, cereal, and fortified milk are high in vitamin D.  · Talk to your doctor about taking a calcium supplement that has vitamin D in it. · Get regular exercise. ¨ Do 30 minutes of weight-bearing exercise on most days of the week. Walking, jogging, stair climbing, and dancing are good choices. ¨ Do resistance exercises with weights or elastic bands 2 or 3 days a week. · Limit alcohol to 2 drinks a day for men and 1 drink a day for women. Too much alcohol can cause health problems. · Do not smoke. Smoking can make bones thin faster. If you need help quitting, talk to your doctor about stop-smoking programs and medicines. These can increase your chances of quitting for good. Prescription medicines are available for treating bone thinning. But these are more often used to treat osteoporosis. Follow-up care is a key part of your treatment and safety. Be sure to make and go to all appointments, and call your doctor if you are having problems. It's also a good idea to know your test results and keep a list of the medicines you take. Where can you learn more? Go to http://tara-pat.info/. Enter F401 in the search box to learn more about \"Learning About Osteopenia. \"  Current as of: May 17, 2017  Content Version: 11.7  © 3563-2720 [employer].  Care instructions adapted under license by [employer] (which disclaims liability or warranty for this information). If you have questions about a medical condition or this instruction, always ask your healthcare professional. [address] any warranty or liability for your use of this information. OSTEOPENIA RECOMMENDATIONS:     - Vitamin D = 0753-1951 IU/day   - Calcium = 600 mg/day   - Gentle weight bearing exercises   - Bone density screening every 2-years. A pharmacist should be able to show you the best combination of these drugs. We will discuss this more after your follow up visit.

## 2018-09-29 NOTE — PROGRESS NOTES
Chief Complaint: 
Chief Complaint Patient presents with  Back Pain Complains of pain radiating to back from breast bone area. Describes as dull History of Present Illness: 
She is a 46 y.o. female who presents with c/o pain radiating from the R-lower breast anterior rib to her back. She reports that the pain is dull and reproducible. No swelling or lesions noted. She denies that she traumatized it in anyway. This started hurting a few weeks ago. Reviewed PmHx, RxHx, FmHx, SocHx, AllgHx and updated and dated in the chart. Patient Active Problem List  
 Diagnosis  Burning sensation of feet  Screening for thyroid disorder  TIA (transient ischemic attack)  Paresthesia of left arm  Chronic neck pain  Anxiety  Injury of elbow, left  Swelling of joint, elbow, left  GERD (gastroesophageal reflux disease)  Unspecified hypothyroidism  Panic disorder without agoraphobia Review of Systems - negative except as listed above in the HPI Objective:  
 
Vitals:  
 09/26/18 1631 BP: 112/70 Pulse: 67 Resp: 20 Temp: 97.8 °F (36.6 °C) TempSrc: Oral  
SpO2: 98% Weight: 134 lb 6.4 oz (61 kg) Height: 5' 7\" (1.702 m) Physical Examination:  
General appearance - alert, well appearing, and in no distress Chest - clear to auscultation, no wheezes, rales or rhonchi, symmetric air entry. Reproducible tenderness lower R-chest. No mass or lesions noted. Heart - normal rate, regular rhythm, normal S1, S2, no murmurs, rubs, clicks or gallops Back exam - full range of motion, no tenderness, palpable spasm or pain on motion Neurological - alert, oriented, normal speech, no focal findings or movement disorder noted Musculoskeletal - no joint tenderness, deformity or swelling Extremities - peripheral pulses normal, no pedal edema, no clubbing or cyanosis Assessment/ Plan:  
Diagnoses and all orders for this visit: 
 
1. Anterior chest wall pain -     XR RIBS BI W PA CHEST 4 VS; Future 2. Anxiety 
-     clonazePAM (KLONOPIN) 0.5 mg tablet; Take 1 Tab by mouth nightly as needed. Max Daily Amount: 0.5 mg. Indications: Panic Disorder 3. Panic disorder without agoraphobia -     clonazePAM (KLONOPIN) 0.5 mg tablet; Take 1 Tab by mouth nightly as needed. Max Daily Amount: 0.5 mg. Indications: Panic Disorder 52F with onset of tenderness along the lower R-chest. No obvious lesions or masses. Will send for xray and will advise when it returns. I have discussed the diagnosis with the patient and the intended treatment plan as seen in the above orders. The patient has received an after-visit summary and questions were answered concerning future plans. Asked to return should symptoms worsen or not improve with treatment. Any pending labs and studies will be relayed to patient when they become available. Pt verbalizes understanding of plan of care and denies further questions or concerns at this time. Milton Allen MD 
Patient Instructions Costochondritis: Care Instructions Your Care Instructions You have chest pain because the cartilage of your rib cage is inflamed. This problem is called costochondritis. This type of chest wall pain may last from days to weeks. It is not a heart problem. Sometimes costochondritis occurs with a cold or the flu, and other times the exact cause is not known. Follow-up care is a key part of your treatment and safety. Be sure to make and go to all appointments, and call your doctor if you are having problems. It's also a good idea to know your test results and keep a list of the medicines you take. How can you care for yourself at home? · Take medicines for pain and inflammation exactly as directed. ¨ If the doctor gave you a prescription medicine, take it as prescribed. ¨ If you are not taking a prescription pain medicine, ask your doctor if you can take an over-the-counter medicine. ¨ Do not take two or more pain medicines at the same time unless the doctor told you to. Many pain medicines have acetaminophen, which is Tylenol. Too much acetaminophen (Tylenol) can be harmful. · It may help to use a warm compress or heating pad (set on low) on your chest. You can also try alternating heat and ice. Put ice or a cold pack on the area for 10 to 20 minutes at a time. Put a thin cloth between the ice and your skin. · Avoid any activity that strains the chest area. As your pain gets better, you can slowly return to your normal activities. · Do not use tape, an elastic bandage, a \"rib belt,\" or anything else that restricts your chest wall motion. When should you call for help? Call 911 anytime you think you may need emergency care. For example, call if: 
  · You have new or different chest pain or pressure. This may occur with: ¨ Sweating. ¨ Shortness of breath. ¨ Nausea or vomiting. ¨ Pain that spreads from the chest to the neck, jaw, or one or both shoulders or arms. ¨ Dizziness or lightheadedness. ¨ A fast or uneven pulse. After calling 911, chew 1 adult-strength aspirin. Wait for an ambulance. Do not try to drive yourself.  
  · You have severe trouble breathing.  
 Call your doctor now or seek immediate medical care if: 
  · You have a fever or cough.  
  · You have any trouble breathing.  
  · Your chest pain gets worse.  
 Watch closely for changes in your health, and be sure to contact your doctor if: 
  · Your chest pain continues even though you are taking anti-inflammatory medicine.  
  · Your chest wall pain has not improved after 5 to 7 days. Where can you learn more? Go to http://tara-pat.info/. Enter I418 in the search box to learn more about \"Costochondritis: Care Instructions. \" Current as of: November 20, 2017 Content Version: 11.7 © 4342-2352 [employer].  Care instructions adapted under license by [address] (which disclaims liability or warranty for this information). If you have questions about a medical condition or this instruction, always ask your healthcare professional. [address] any warranty or liability for your use of this information.

## 2018-10-01 DIAGNOSIS — M85.89 OSTEOPENIA OF MULTIPLE SITES: ICD-10-CM

## 2018-10-01 RX ORDER — ALENDRONATE SODIUM 70 MG/1
70 TABLET ORAL
Qty: 12 TAB | Refills: 3 | Status: SHIPPED | OUTPATIENT
Start: 2018-10-01 | End: 2018-12-30

## 2018-10-01 NOTE — TELEPHONE ENCOUNTER
Message forwarded from call center    Pt would like the prescription for fosamax to be called into a different pharmacy. She would like to use [facility] [phone]  because the [facility] did not have the medication in stock.  Pt contact [phone].

## 2018-10-24 ENCOUNTER — OFFICE VISIT (OUTPATIENT)
Dept: FAMILY MEDICINE CLINIC | Age: 53
End: 2018-10-24

## 2018-10-24 VITALS
OXYGEN SATURATION: 98 % | DIASTOLIC BLOOD PRESSURE: 68 MMHG | HEART RATE: 61 BPM | SYSTOLIC BLOOD PRESSURE: 119 MMHG | WEIGHT: 135.2 LBS | BODY MASS INDEX: 21.22 KG/M2 | RESPIRATION RATE: 20 BRPM | TEMPERATURE: 97.8 F | HEIGHT: 67 IN

## 2018-10-24 DIAGNOSIS — H10.9 CONJUNCTIVITIS OF RIGHT EYE, UNSPECIFIED CONJUNCTIVITIS TYPE: Primary | ICD-10-CM

## 2018-10-24 RX ORDER — TOBRAMYCIN AND DEXAMETHASONE 3; 1 MG/ML; MG/ML
2 SUSPENSION/ DROPS OPHTHALMIC 2 TIMES DAILY
Qty: 5 ML | Refills: 0 | Status: SHIPPED | OUTPATIENT
Start: 2018-10-24 | End: 2018-10-31

## 2018-10-24 NOTE — PROGRESS NOTES
Identified pt with two pt identifiers(name and ). Chief Complaint Patient presents with [address] Right eye with redness to inner cornea; complains of pain with touch and blinking; drainage noted by patient Health Maintenance Due Topic  Hepatitis C Screening  PAP AKA CERVICAL CYTOLOGY  Shingrix Vaccine Age 50> (1 of 2)  BREAST CANCER SCRN MAMMOGRAM   
 FOBT Q 1 YEAR AGE 50-75  Influenza Age 5 to Adult Wt Readings from Last 3 Encounters:  
18 134 lb (60.8 kg) 18 134 lb 6.4 oz (61 kg) 18 133 lb 9.6 oz (60.6 kg) Temp Readings from Last 3 Encounters:  
18 98 °F (36.7 °C)  
18 97.8 °F (36.6 °C) (Oral) 18 97.9 °F (36.6 °C) (Oral) BP Readings from Last 3 Encounters:  
18 112/70  
18 112/70  
18 130/88 Pulse Readings from Last 3 Encounters:  
18 76  
18 67  
18 68 Learning Assessment: 
:  
 
Learning Assessment 10/14/2015 3/12/2014 PRIMARY LEARNER Patient Patient HIGHEST LEVEL OF EDUCATION - PRIMARY LEARNER  - SOME COLLEGE  
BARRIERS PRIMARY LEARNER NONE NONE  
CO-LEARNER CAREGIVER - No  
PRIMARY LANGUAGE ENGLISH ENGLISH  
LEARNER PREFERENCE PRIMARY READING READING  
ANSWERED BY Jeana patient RELATIONSHIP SELF SELF Depression Screening: 
:  
 
PHQ over the last two weeks 10/24/2018 Little interest or pleasure in doing things Not at all Feeling down, depressed, irritable, or hopeless Not at all Total Score PHQ 2 0 Fall Risk Assessment: 
:  
 
No flowsheet data found. Abuse Screening: 
:  
 
Abuse Screening Questionnaire 3/12/2014 Do you ever feel afraid of your partner? Vernona Stanton Are you in a relationship with someone who physically or mentally threatens you? Verhannaha Danna Is it safe for you to go home? Rohan Naidu Coordination of Care Questionnaire: 
:  
 
1) Have you been to an emergency room, urgent care clinic since your last visit? no  
 Hospitalized since your last visit? no          
 
2) Have you seen or consulted any other health care providers outside of [address] [place] [address] since your last visit? yes  GYN and Dermatologist (Include any pap smears or colon screenings in this section.) 3) Do you have an Advance Directive on file? no 
Are you interested in receiving information about Advance Directives? no 
 
Reviewed record in preparation for visit and have obtained necessary documentation. Medication reconciliation up to date and corrected with patient at this time.

## 2018-10-24 NOTE — PATIENT INSTRUCTIONS
Pinkeye: Care Instructions Your Care Instructions Pinkeye is redness and swelling of the eye surface and the conjunctiva (the lining of the eyelid and the covering of the white part of the eye). Pinkeye is also called conjunctivitis. Pinkeye is often caused by infection with bacteria or a virus. Dry air, allergies, smoke, and chemicals are other common causes. Pinkeye often clears on its own in 7 to 10 days. Antibiotics only help if the pinkeye is caused by bacteria. Pinkeye caused by infection spreads easily. If an allergy or chemical is causing pinkeye, it will not go away unless you can avoid whatever is causing it. Follow-up care is a key part of your treatment and safety. Be sure to make and go to all appointments, and call your doctor if you are having problems. It's also a good idea to know your test results and keep a list of the medicines you take. How can you care for yourself at home? · Wash your hands often. Always wash them before and after you treat pinkeye or touch your eyes or face. · Use moist cotton or a clean, wet cloth to remove crust. Wipe from the inside corner of the eye to the outside. Use a clean part of the cloth for each wipe. · Put cold or warm wet cloths on your eye a few times a day if the eye hurts. · Do not wear contact lenses or eye makeup until the pinkeye is gone. Throw away any eye makeup you were using when you got pinkeye. Clean your contacts and storage case. If you wear disposable contacts, use a new pair when your eye has cleared and it is safe to wear contacts again. · If the doctor gave you antibiotic ointment or eyedrops, use them as directed. Use the medicine for as long as instructed, even if your eye starts looking better soon. Keep the bottle tip clean, and do not let it touch the eye area. · To put in eyedrops or ointment: ? Tilt your head back, and pull your lower eyelid down with one finger. ? Drop or squirt the medicine inside the lower lid. ? Close your eye for 30 to 60 seconds to let the drops or ointment move around. ? Do not touch the ointment or dropper tip to your eyelashes or any other surface. · Do not share towels, pillows, or washcloths while you have pinkeye. When should you call for help? Call your doctor now or seek immediate medical care if: 
  · You have pain in your eye, not just irritation on the surface.  
  · You have a change in vision or loss of vision.  
  · You have an increase in discharge from the eye.  
  · Your eye has not started to improve or begins to get worse within 48 hours after you start using antibiotics.  
  · Pinkeye lasts longer than 7 days.  
 Watch closely for changes in your health, and be sure to contact your doctor if you have any problems. Where can you learn more? Go to http://tara-pat.info/. Enter Y392 in the search box to learn more about \"Pinkeye: Care Instructions. \" Current as of: November 20, 2017 Content Version: 11.8 © 9660-0496 [employer]. Care instructions adapted under license by [employer] (which disclaims liability or warranty for this information). If you have questions about a medical condition or this instruction, always ask your healthcare professional. [address] any warranty or liability for your use of this information. Pinkeye: Care Instructions Your Care Instructions Pinkeye is redness and swelling of the eye surface and the conjunctiva (the lining of the eyelid and the covering of the white part of the eye). Pinkeye is also called conjunctivitis. Pinkeye is often caused by infection with bacteria or a virus. Dry air, allergies, smoke, and chemicals are other common causes. Pinkeye often clears on its own in 7 to 10 days. Antibiotics only help if the pinkeye is caused by bacteria. Pinkeye caused by infection spreads easily.  If an allergy or chemical is causing pinkeye, it will not go away unless you can avoid whatever is causing it. Follow-up care is a key part of your treatment and safety. Be sure to make and go to all appointments, and call your doctor if you are having problems. It's also a good idea to know your test results and keep a list of the medicines you take. How can you care for yourself at home? · Wash your hands often. Always wash them before and after you treat pinkeye or touch your eyes or face. · Use moist cotton or a clean, wet cloth to remove crust. Wipe from the inside corner of the eye to the outside. Use a clean part of the cloth for each wipe. · Put cold or warm wet cloths on your eye a few times a day if the eye hurts. · Do not wear contact lenses or eye makeup until the pinkeye is gone. Throw away any eye makeup you were using when you got pinkeye. Clean your contacts and storage case. If you wear disposable contacts, use a new pair when your eye has cleared and it is safe to wear contacts again. · If the doctor gave you antibiotic ointment or eyedrops, use them as directed. Use the medicine for as long as instructed, even if your eye starts looking better soon. Keep the bottle tip clean, and do not let it touch the eye area. · To put in eyedrops or ointment: ? Tilt your head back, and pull your lower eyelid down with one finger. ? Drop or squirt the medicine inside the lower lid. ? Close your eye for 30 to 60 seconds to let the drops or ointment move around. ? Do not touch the ointment or dropper tip to your eyelashes or any other surface. · Do not share towels, pillows, or washcloths while you have pinkeye. When should you call for help?  
Call your doctor now or seek immediate medical care if: 
  · You have pain in your eye, not just irritation on the surface.  
  · You have a change in vision or loss of vision.  
  · You have an increase in discharge from the eye.  
  · Your eye has not started to improve or begins to get worse within 48 hours after you start using antibiotics.  
  · Pinkeye lasts longer than 7 days.  
 Watch closely for changes in your health, and be sure to contact your doctor if you have any problems. Where can you learn more? Go to http://tara-pat.info/. Enter Y392 in the search box to learn more about \"Pinkeye: Care Instructions. \" Current as of: November 20, 2017 Content Version: 11.8 © 4998-6808 [employer]. Care instructions adapted under license by [employer] (which disclaims liability or warranty for this information). If you have questions about a medical condition or this instruction, always ask your healthcare professional. [address] any warranty or liability for your use of this information.

## 2018-10-24 NOTE — LETTER
NOTIFICATION RETURN TO WORK / SCHOOL 
 
10/24/2018 4:11 PM 
 
Ms. Frank Piper [address] [facility] [id number] To Whom It May Concern: 
 
Frank Piper is currently under the care of [address] [place] [address]. She will return to work/school on: 10/26/2018. If there are questions or concerns please have the patient contact our office. Sincerely, Juni Bynum MD

## 2018-10-29 NOTE — PROGRESS NOTES
Chief Complaint: 
Chief Complaint Patient presents with [address] [place] Right eye with redness to inner cornea; complains of pain with touch and blinking; drainage noted by patient History of Present Illness: 
She is a 48 y.o. female who presents with c/o right eye redness and discomfort. She denies any trauma to her eye. Started to notice discomfort and pain when touching or blinking. There is mucoid drainage. She typically wears contact lenses, but has not done so today. Reviewed PmHx, RxHx, FmHx, SocHx, AllgHx and updated and dated in the chart. Patient Active Problem List  
 Diagnosis  Burning sensation of feet  Screening for thyroid disorder  TIA (transient ischemic attack)  Paresthesia of left arm  Chronic neck pain  Anxiety  Injury of elbow, left  Swelling of joint, elbow, left  GERD (gastroesophageal reflux disease)  Unspecified hypothyroidism  Panic disorder without agoraphobia Review of Systems - negative except as listed above in the HPI Objective:  
 
Vitals:  
 10/24/18 1543 BP: 119/68 Pulse: 61 Resp: 20 Temp: 97.8 °F (36.6 °C) TempSrc: Oral  
SpO2: 98% Weight: 135 lb 3.2 oz (61.3 kg) Height: 5' 7\" (1.702 m) Physical Examination:  
General appearance - alert, well appearing, and in no distress, oriented to person, place, and time and normal appearing weight Eyes - pupils equal and reactive, extraocular eye movements intact, conjunctivitis noted right eye. No preseptal cellulitis. Chest - clear to auscultation, no wheezes, rales or rhonchi, symmetric air entry Heart - normal rate, regular rhythm, normal S1, S2, no murmurs, rubs, clicks or gallops Extremities - peripheral pulses normal, no pedal edema, no clubbing or cyanosis Skin - normal coloration and turgor, no rashes, no suspicious skin lesions noted Assessment/ Plan:  
Diagnoses and all orders for this visit: 
 
 1. Conjunctivitis of right eye, unspecified conjunctivitis type Other orders 
-     tobramycin-dexamethasone (TOBRADEX) ophthalmic suspension; Administer 2 Drops to both eyes two (2) times a day for 7 days. I have discussed the diagnosis with the patient and the intended treatment plan as seen in the above orders. The patient has received an after-visit summary and questions were answered concerning future plans. Asked to return should symptoms worsen or not improve with treatment. Any pending labs and studies will be relayed to patient when they become available. Pt verbalizes understanding of plan of care and denies further questions or concerns at this time. Follow-up Disposition: 
Return if symptoms worsen or fail to improve. Mariano Cleary MD 
Patient Instructions Pinkeye: Care Instructions Your Care Instructions Pinkeye is redness and swelling of the eye surface and the conjunctiva (the lining of the eyelid and the covering of the white part of the eye). Pinkeye is also called conjunctivitis. Pinkeye is often caused by infection with bacteria or a virus. Dry air, allergies, smoke, and chemicals are other common causes. Pinkeye often clears on its own in 7 to 10 days. Antibiotics only help if the pinkeye is caused by bacteria. Pinkeye caused by infection spreads easily. If an allergy or chemical is causing pinkeye, it will not go away unless you can avoid whatever is causing it. Follow-up care is a key part of your treatment and safety. Be sure to make and go to all appointments, and call your doctor if you are having problems. It's also a good idea to know your test results and keep a list of the medicines you take. How can you care for yourself at home? · Wash your hands often. Always wash them before and after you treat pinkeye or touch your eyes or face.  
· Use moist cotton or a clean, wet cloth to remove crust. Wipe from the inside corner of the eye to the outside. Use a clean part of the cloth for each wipe. · Put cold or warm wet cloths on your eye a few times a day if the eye hurts. · Do not wear contact lenses or eye makeup until the pinkeye is gone. Throw away any eye makeup you were using when you got pinkeye. Clean your contacts and storage case. If you wear disposable contacts, use a new pair when your eye has cleared and it is safe to wear contacts again. · If the doctor gave you antibiotic ointment or eyedrops, use them as directed. Use the medicine for as long as instructed, even if your eye starts looking better soon. Keep the bottle tip clean, and do not let it touch the eye area. · To put in eyedrops or ointment: ? Tilt your head back, and pull your lower eyelid down with one finger. ? Drop or squirt the medicine inside the lower lid. ? Close your eye for 30 to 60 seconds to let the drops or ointment move around. ? Do not touch the ointment or dropper tip to your eyelashes or any other surface. · Do not share towels, pillows, or washcloths while you have pinkeye. When should you call for help? Call your doctor now or seek immediate medical care if: 
  · You have pain in your eye, not just irritation on the surface.  
  · You have a change in vision or loss of vision.  
  · You have an increase in discharge from the eye.  
  · Your eye has not started to improve or begins to get worse within 48 hours after you start using antibiotics.  
  · Pinkeye lasts longer than 7 days.  
 Watch closely for changes in your health, and be sure to contact your doctor if you have any problems. Where can you learn more? Go to http://tara-pat.info/. Enter Y392 in the search box to learn more about \"Pinkeye: Care Instructions. \" Current as of: November 20, 2017 Content Version: 11.8 © 6082-4796 Healthwise, Incorporated.  Care instructions adapted under license by [address] (which disclaims liability or warranty for this information). If you have questions about a medical condition or this instruction, always ask your healthcare professional. [address] any warranty or liability for your use of this information. Pinkeye: Care Instructions Your Care Instructions Pinkeye is redness and swelling of the eye surface and the conjunctiva (the lining of the eyelid and the covering of the white part of the eye). Pinkeye is also called conjunctivitis. Pinkeye is often caused by infection with bacteria or a virus. Dry air, allergies, smoke, and chemicals are other common causes. Pinkeye often clears on its own in 7 to 10 days. Antibiotics only help if the pinkeye is caused by bacteria. Pinkeye caused by infection spreads easily. If an allergy or chemical is causing pinkeye, it will not go away unless you can avoid whatever is causing it. Follow-up care is a key part of your treatment and safety. Be sure to make and go to all appointments, and call your doctor if you are having problems. It's also a good idea to know your test results and keep a list of the medicines you take. How can you care for yourself at home? · Wash your hands often. Always wash them before and after you treat pinkeye or touch your eyes or face. · Use moist cotton or a clean, wet cloth to remove crust. Wipe from the inside corner of the eye to the outside. Use a clean part of the cloth for each wipe. · Put cold or warm wet cloths on your eye a few times a day if the eye hurts. · Do not wear contact lenses or eye makeup until the pinkeye is gone. Throw away any eye makeup you were using when you got pinkeye. Clean your contacts and storage case. If you wear disposable contacts, use a new pair when your eye has cleared and it is safe to wear contacts again.  
· If the doctor gave you antibiotic ointment or eyedrops, use them as directed. Use the medicine for as long as instructed, even if your eye starts looking better soon. Keep the bottle tip clean, and do not let it touch the eye area. · To put in eyedrops or ointment: ? Tilt your head back, and pull your lower eyelid down with one finger. ? Drop or squirt the medicine inside the lower lid. ? Close your eye for 30 to 60 seconds to let the drops or ointment move around. ? Do not touch the ointment or dropper tip to your eyelashes or any other surface. · Do not share towels, pillows, or washcloths while you have pinkeye. When should you call for help? Call your doctor now or seek immediate medical care if: 
  · You have pain in your eye, not just irritation on the surface.  
  · You have a change in vision or loss of vision.  
  · You have an increase in discharge from the eye.  
  · Your eye has not started to improve or begins to get worse within 48 hours after you start using antibiotics.  
  · Pinkeye lasts longer than 7 days.  
 Watch closely for changes in your health, and be sure to contact your doctor if you have any problems. Where can you learn more? Go to http://tara-pat.info/. Enter Y392 in the search box to learn more about \"Pinkeye: Care Instructions. \" Current as of: November 20, 2017 Content Version: 11.8 © 1782-9213 Healthwise, Incorporated. Care instructions adapted under license by [employer] (which disclaims liability or warranty for this information). If you have questions about a medical condition or this instruction, always ask your healthcare professional. [place] [address] any warranty or liability for your use of this information.

## 2019-03-01 ENCOUNTER — OFFICE VISIT (OUTPATIENT)
Dept: FAMILY MEDICINE CLINIC | Age: 54
End: 2019-03-01

## 2019-03-01 VITALS
DIASTOLIC BLOOD PRESSURE: 80 MMHG | HEIGHT: 67 IN | OXYGEN SATURATION: 100 % | SYSTOLIC BLOOD PRESSURE: 119 MMHG | HEART RATE: 64 BPM | WEIGHT: 139 LBS | TEMPERATURE: 97.4 F | BODY MASS INDEX: 21.82 KG/M2 | RESPIRATION RATE: 16 BRPM

## 2019-03-01 DIAGNOSIS — M54.9 UPPER BACK PAIN ON RIGHT SIDE: ICD-10-CM

## 2019-03-01 DIAGNOSIS — F41.0 PANIC DISORDER WITHOUT AGORAPHOBIA: ICD-10-CM

## 2019-03-01 DIAGNOSIS — F41.9 ANXIETY: Primary | ICD-10-CM

## 2019-03-01 RX ORDER — CLONAZEPAM 0.5 MG/1
0.5 TABLET ORAL
Qty: 20 TAB | Refills: 0 | Status: SHIPPED | OUTPATIENT
Start: 2019-03-01 | End: 2020-03-16 | Stop reason: SDUPTHER

## 2019-03-01 NOTE — PROGRESS NOTES
HISTORY OF PRESENT ILLNESS  Soumya Lyman is a 48 y.o. female. HPI   Pt presents with \"increased anxiety, and right upper back pain\"  Pt states that she has noted an increase that started January 19  She has been keeping track of the attacks since then, and she has had at least 5 between then and now. She states that she is unsure what has caused the increase. She has tried to not take the Klonopin, unless she has full on panic attack, but it has become difficult to control her anxiety. She is interested in therapy, to see if this would help her more so then the Klonopin. Pt has tried citalopram, zoloft, effexor, without any benefit. In addition, for over a year, patient has had a pain in her right upper back. X-ray was done, and normal result besides demineralization, and was then placed on Fosamax. Pt states that the pain is noted with certain movements, and she feels as though at times, she is unable to get to the pain as it is deep. No shortness of breath. Review of Systems   Constitutional: Negative for fever. HENT: Negative for congestion. Respiratory: Negative for cough. Gastrointestinal: Negative for diarrhea and vomiting. Musculoskeletal: Positive for back pain. Psychiatric/Behavioral: The patient is nervous/anxious. Physical Exam   Constitutional: She is oriented to person, place, and time. She appears well-developed and well-nourished. HENT:   Head: Normocephalic and atraumatic. Cardiovascular: Normal rate, regular rhythm and normal heart sounds. Pulmonary/Chest: Effort normal and breath sounds normal.   Musculoskeletal:        Back:    Neurological: She is alert and oriented to person, place, and time. Skin: Skin is warm and dry. Psychiatric: She has a normal mood and affect. Her behavior is normal.       ASSESSMENT and PLAN    ICD-10-CM ICD-9-CM    1. Anxiety F41.9 300.00 clonazePAM (KLONOPIN) 0.5 mg tablet      REFERRAL TO PSYCHIATRY   2.  Panic disorder without agoraphobia F41.0 300.01 clonazePAM (KLONOPIN) 0.5 mg tablet      REFERRAL TO PSYCHIATRY     Educated about calling psych for appointment ASAP. Will refill Klonopin in mean time, to aid in panic attacks. Educated about calling PT for an appointment, for back pain. Should notify office should pain not improve. Pt informed to return to office with worsening of symptoms, or PRN with any questions or concerns. Pt verbalizes understanding of plan of care and denies further questions or concerns at this time.

## 2019-03-01 NOTE — PROGRESS NOTES
Identified pt with two pt identifiers(name and ). Chief Complaint   Patient presents with    Panic Attack     has noticed increase in panic attacks since         Health Maintenance Due   Topic    Hepatitis C Screening     PAP AKA CERVICAL CYTOLOGY     Shingrix Vaccine Age 50> (1 of 2)    BREAST CANCER SCRN MAMMOGRAM     FOBT Q 1 YEAR AGE 50-75        Wt Readings from Last 3 Encounters:   19 139 lb (63 kg)   10/24/18 135 lb 3.2 oz (61.3 kg)   18 134 lb (60.8 kg)     Temp Readings from Last 3 Encounters:   19 97.4 °F (36.3 °C) (Oral)   10/24/18 97.8 °F (36.6 °C) (Oral)   18 98 °F (36.7 °C)     BP Readings from Last 3 Encounters:   19 119/80   10/24/18 119/68   18 112/70     Pulse Readings from Last 3 Encounters:   19 64   10/24/18 61   18 76         Learning Assessment:  :     Learning Assessment 10/14/2015 3/12/2014   PRIMARY LEARNER Patient Patient   HIGHEST LEVEL OF EDUCATION - PRIMARY LEARNER  - SOME COLLEGE   BARRIERS PRIMARY LEARNER NONE NONE   CO-LEARNER CAREGIVER - No   PRIMARY LANGUAGE ENGLISH ENGLISH   LEARNER PREFERENCE PRIMARY READING READING   ANSWERED BY Jeana patient   RELATIONSHIP SELF SELF       Depression Screening:  :     3 most recent PHQ Screens 10/24/2018   Little interest or pleasure in doing things Not at all   Feeling down, depressed, irritable, or hopeless Not at all   Total Score PHQ 2 0       Fall Risk Assessment:  :     No flowsheet data found. Abuse Screening:  :     Abuse Screening Questionnaire 3/1/2019 3/12/2014   Do you ever feel afraid of your partner? N N   Are you in a relationship with someone who physically or mentally threatens you? N N   Is it safe for you to go home?  Y Y           Coordination of Care Questionnaire:  :     1) Have you been to an emergency room, urgent care clinic since your last visit? no   Hospitalized since your last visit? no             2) Have you seen or consulted any other health care providers outside of [address] [place] [address] since your last visit? no  (Include any pap smears or colon screenings in this section.)    3) Do you have an Advance Directive on file? no  Are you interested in receiving information about Advance Directives? no    Patient is accompanied by self. Reviewed record in preparation for visit and have obtained necessary documentation. Medication reconciliation up to date and corrected with patient at this time.

## 2019-03-01 NOTE — PATIENT INSTRUCTIONS

## 2019-03-01 NOTE — LETTER
NOTIFICATION RETURN TO WORK / SCHOOL 
 
3/1/2019 8:50 AM 
 
Ms. Pat Michael [address] [facility] [address] [id number] To Whom It May Concern: 
 
Pat Michael is currently under the care of [address] [place] [address]. She was seen today for office visit and informed us that she has received well woman exam with mammogram within the past year. For continuity of care, will you please send these records. : 1965 Fax number: [phone] If there are questions or concerns please have the patient contact our office. Sincerely, Teddy Pretty NP

## 2019-03-03 ENCOUNTER — APPOINTMENT (OUTPATIENT)
Dept: GENERAL RADIOLOGY | Age: 54
End: 2019-03-03
Attending: EMERGENCY MEDICINE
Payer: COMMERCIAL

## 2019-03-03 ENCOUNTER — HOSPITAL ENCOUNTER (EMERGENCY)
Age: 54
Discharge: HOME | End: 2019-03-04
Attending: EMERGENCY MEDICINE
Payer: COMMERCIAL

## 2019-03-03 DIAGNOSIS — R00.2 PALPITATIONS: ICD-10-CM

## 2019-03-03 DIAGNOSIS — R07.9 ACUTE CHEST PAIN: Primary | ICD-10-CM

## 2019-03-03 LAB
BASOPHILS # BLD: 0.1 K/UL (ref 0–0.1)
BASOPHILS NFR BLD: 1 % (ref 0–1)
COMMENT, HOLDF: NORMAL
DIFFERENTIAL METHOD BLD: NORMAL
EOSINOPHIL # BLD: 0.1 K/UL (ref 0–0.4)
EOSINOPHIL NFR BLD: 2 % (ref 0–7)
ERYTHROCYTE [DISTWIDTH] IN BLOOD BY AUTOMATED COUNT: 12.3 % (ref 11.5–14.5)
HCT VFR BLD AUTO: 39.5 % (ref 35–47)
HGB BLD-MCNC: 13.3 G/DL (ref 11.5–16)
IMM GRANULOCYTES # BLD AUTO: 0 K/UL (ref 0–0.04)
IMM GRANULOCYTES NFR BLD AUTO: 0 % (ref 0–0.5)
LYMPHOCYTES # BLD: 2.6 K/UL (ref 0.8–3.5)
LYMPHOCYTES NFR BLD: 45 % (ref 12–49)
MCH RBC QN AUTO: 30.9 PG (ref 26–34)
MCHC RBC AUTO-ENTMCNC: 33.7 G/DL (ref 30–36.5)
MCV RBC AUTO: 91.9 FL (ref 80–99)
MONOCYTES # BLD: 0.5 K/UL (ref 0–1)
MONOCYTES NFR BLD: 9 % (ref 5–13)
NEUTS SEG # BLD: 2.4 K/UL (ref 1.8–8)
NEUTS SEG NFR BLD: 42 % (ref 32–75)
NRBC # BLD: 0 K/UL (ref 0–0.01)
NRBC BLD-RTO: 0 PER 100 WBC
PLATELET # BLD AUTO: 229 K/UL (ref 150–400)
PMV BLD AUTO: 9.5 FL (ref 8.9–12.9)
RBC # BLD AUTO: 4.3 M/UL (ref 3.8–5.2)
SAMPLES BEING HELD,HOLD: NORMAL
TROPONIN I BLD-MCNC: <0.04 NG/ML (ref 0–0.08)
WBC # BLD AUTO: 5.8 K/UL (ref 3.6–11)

## 2019-03-03 PROCEDURE — 36415 COLL VENOUS BLD VENIPUNCTURE: CPT

## 2019-03-03 PROCEDURE — 93005 ELECTROCARDIOGRAM TRACING: CPT

## 2019-03-03 PROCEDURE — 99285 EMERGENCY DEPT VISIT HI MDM: CPT

## 2019-03-03 PROCEDURE — 83735 ASSAY OF MAGNESIUM: CPT

## 2019-03-03 PROCEDURE — 71046 X-RAY EXAM CHEST 2 VIEWS: CPT

## 2019-03-03 PROCEDURE — 80048 BASIC METABOLIC PNL TOTAL CA: CPT

## 2019-03-03 PROCEDURE — 84484 ASSAY OF TROPONIN QUANT: CPT

## 2019-03-03 PROCEDURE — 85025 COMPLETE CBC W/AUTO DIFF WBC: CPT

## 2019-03-04 VITALS
HEIGHT: 67 IN | RESPIRATION RATE: 15 BRPM | HEART RATE: 65 BPM | BODY MASS INDEX: 21.19 KG/M2 | TEMPERATURE: 98.4 F | SYSTOLIC BLOOD PRESSURE: 113 MMHG | OXYGEN SATURATION: 96 % | WEIGHT: 135 LBS | DIASTOLIC BLOOD PRESSURE: 78 MMHG

## 2019-03-04 LAB
ANION GAP SERPL CALC-SCNC: 8 MMOL/L (ref 5–15)
ATRIAL RATE: 79 BPM
BUN SERPL-MCNC: 17 MG/DL (ref 6–20)
BUN/CREAT SERPL: 22 (ref 12–20)
CALCIUM SERPL-MCNC: 9.1 MG/DL (ref 8.5–10.1)
CALCULATED P AXIS, ECG09: 19 DEGREES
CALCULATED R AXIS, ECG10: 69 DEGREES
CALCULATED T AXIS, ECG11: 33 DEGREES
CHLORIDE SERPL-SCNC: 106 MMOL/L (ref 97–108)
CO2 SERPL-SCNC: 26 MMOL/L (ref 21–32)
CREAT SERPL-MCNC: 0.77 MG/DL (ref 0.55–1.02)
DIAGNOSIS, 93000: NORMAL
GLUCOSE SERPL-MCNC: 102 MG/DL (ref 65–100)
MAGNESIUM SERPL-MCNC: 2 MG/DL (ref 1.6–2.4)
P-R INTERVAL, ECG05: 142 MS
POTASSIUM SERPL-SCNC: 3.7 MMOL/L (ref 3.5–5.1)
Q-T INTERVAL, ECG07: 392 MS
QRS DURATION, ECG06: 82 MS
QTC CALCULATION (BEZET), ECG08: 449 MS
SODIUM SERPL-SCNC: 140 MMOL/L (ref 136–145)
VENTRICULAR RATE, ECG03: 79 BPM

## 2019-03-04 NOTE — DISCHARGE INSTRUCTIONS
Patient Education        Chest Pain: Care Instructions  Your Care Instructions    There are many things that can cause chest pain. Some are not serious and will get better on their own in a few days. But some kinds of chest pain need more testing and treatment. Your doctor may have recommended a follow-up visit in the next 8 to 12 hours. If you are not getting better, you may need more tests or treatment. Even though your doctor has released you, you still need to watch for any problems. The doctor carefully checked you, but sometimes problems can develop later. If you have new symptoms or if your symptoms do not get better, get medical care right away. If you have worse or different chest pain or pressure that lasts more than 5 minutes or you passed out (lost consciousness), call 911 or seek other emergency help right away. A medical visit is only one step in your treatment. Even if you feel better, you still need to do what your doctor recommends, such as going to all suggested follow-up appointments and taking medicines exactly as directed. This will help you recover and help prevent future problems. How can you care for yourself at home? · Rest until you feel better. · Take your medicine exactly as prescribed. Call your doctor if you think you are having a problem with your medicine. · Do not drive after taking a prescription pain medicine. When should you call for help? Call 911 if:    · You passed out (lost consciousness).     · You have severe difficulty breathing.     · You have symptoms of a heart attack. These may include:  ? Chest pain or pressure, or a strange feeling in your chest.  ? Sweating. ? Shortness of breath. ? Nausea or vomiting. ? Pain, pressure, or a strange feeling in your back, neck, jaw, or upper belly or in one or both shoulders or arms. ? Lightheadedness or sudden weakness. ? A fast or irregular heartbeat.   After you call 911, the  may tell you to chew 1 adult-strength or 2 to 4 low-dose aspirin. Wait for an ambulance. Do not try to drive yourself.    Call your doctor today if:    · You have any trouble breathing.     · Your chest pain gets worse.     · You are dizzy or lightheaded, or you feel like you may faint.     · You are not getting better as expected.     · You are having new or different chest pain. Where can you learn more? Go to http://tara-pat.info/. Enter A120 in the search box to learn more about \"Chest Pain: Care Instructions. \"  Current as of: September 23, 2018  Content Version: 11.9  © 1174-9805 [employer]. Care instructions adapted under license by [employer] (which disclaims liability or warranty for this information). If you have questions about a medical condition or this instruction, always ask your healthcare professional. [place] [address] any warranty or liability for your use of this information. Patient Education        Palpitations: Care Instructions  Your Care Instructions    Heart palpitations are the uncomfortable sensation that your heart is beating fast or irregularly. You might feel pounding or fluttering in your chest. It might feel like your heart is skipping a beat. Although palpitations may be caused by a heart problem, they also occur because of stress, fatigue, or use of alcohol, caffeine, or nicotine. Many medicines, including diet pills, antihistamines, decongestants, and some herbal products, can cause heart palpitations. Nearly everyone has palpitations from time to time. Depending on your symptoms, your doctor may need to do more tests to try to find the cause of your palpitations. Follow-up care is a key part of your treatment and safety. Be sure to make and go to all appointments, and call your doctor if you are having problems. It's also a good idea to know your test results and keep a list of the medicines you take.   How can you care for yourself at home? · Avoid caffeine, nicotine, and excess alcohol. · Do not take illegal drugs, such as methamphetamines and cocaine. · Do not take weight loss or diet medicines unless you talk with your doctor first.  · Get plenty of sleep. · Do not overeat. · If you have palpitations again, take deep breaths and try to relax. This may slow a racing heart. · If you start to feel lightheaded, lie down to avoid injuries that might result if you pass out and fall down. · Keep a record of your palpitations and bring it to your next doctor's appointment. Write down:  ? The date and time. ? Your pulse. (If your heart is beating fast, it may be hard to count your pulse.)  ? What you were doing when the palpitations started. ? How long the palpitations lasted. ? Any other symptoms. · If an activity causes palpitations, slow down or stop. Talk to your doctor before you do that activity again. · Take your medicines exactly as prescribed. Call your doctor if you think you are having a problem with your medicine. When should you call for help? Call 911 anytime you think you may need emergency care. For example, call if:    · You passed out (lost consciousness).     · You have symptoms of a heart attack. These may include:  ? Chest pain or pressure, or a strange feeling in the chest.  ? Sweating. ? Shortness of breath. ? Pain, pressure, or a strange feeling in the back, neck, jaw, or upper belly or in one or both shoulders or arms. ? Lightheadedness or sudden weakness. ? A fast or irregular heartbeat. After you call 911, the  may tell you to chew 1 adult-strength or 2 to 4 low-dose aspirin. Wait for an ambulance. Do not try to drive yourself.     · You have symptoms of a stroke. These may include:  ? Sudden numbness, tingling, weakness, or loss of movement in your face, arm, or leg, especially on only one side of your body. ? Sudden vision changes. ? Sudden trouble speaking.   ? Sudden confusion or trouble understanding simple statements. ? Sudden problems with walking or balance. ? A sudden, severe headache that is different from past headaches.    Call your doctor now or seek immediate medical care if:    · You have heart palpitations and:  ? Are dizzy or lightheaded, or you feel like you may faint. ? Have new or increased shortness of breath.    Watch closely for changes in your health, and be sure to contact your doctor if:    · You continue to have heart palpitations. Where can you learn more? Go to http://tara-pat.info/. Enter R508 in the search box to learn more about \"Palpitations: Care Instructions. \"  Current as of: July 22, 2018  Content Version: 11.9  © 3414-8252 [employer]. Care instructions adapted under license by [employer] (which disclaims liability or warranty for this information). If you have questions about a medical condition or this instruction, always ask your healthcare professional. [place] [address] any warranty or liability for your use of this information.

## 2019-03-04 NOTE — ED PROVIDER NOTES
Cecy Landin is a 48 y.o. female who presents to the ED via EMS with a c/o palpitations, dizziness, intermittent chest pain onset this morning. Pt currently rates her pain at 2/10 in severity. Pt states that she first felt the chest pain at around 11:00 this morning and she states that it started as a \"mild, burning, pressure, steady pain\" that last for hours before going away on its own. She reports that the pain started again a few hours later and became more severe. Pt notes that tonight when she laid down to sleep she felt fluttering and dizziness. Of note, pt reports that she has a hx of infrequent panic attacks but states that since January of this year she has had 2 severe panic attacks and several minor episodes. She also had a stress test and was on a cardiac monitor 10 years ago. She did not take any ASA or SL Nitro PTA. Pt specifically denies n/v,d , fever, chills, numbness, tingling, abdominal pain, back pain, cough, leg swelling, or any other acute sx. Pt denies any recent travel, known sick contacts, or recent illness. No exertional sx's; walks regularly without difficulty. PCP: Zhou Chinchilla NP 
PMHx significant for: GERD, Hypothyroidism, TIA, Chronic neck pain, Arthritis, PUD, Hiatal Hernia, Headaches, Anxiety PSHx significant for: Bilateral Tubal Ligation, [place] Teeth Extraction Social Hx: Tobacco: none EtOH: occasional Illicit drug use: none There are no further complaints or symptoms at this time. Signed by: tasia Augustibnorma for Christiano Gibbs MD on  March 3rd, 2019 at 23:49pm. 
 
 
 
 
  
 
Past Medical History:  
Diagnosis Date  Anxiety  Arthritis  Chronic neck pain 6/27/2015  Fatigue  GERD (gastroesophageal reflux disease) 3/12/2014  Headache   
 Hiatal hernia  PUD (peptic ulcer disease)  TIA (transient ischemic attack) 6/27/2015  Unspecified hypothyroidism 12/19/2012 Past Surgical History: Procedure Laterality Date  HX GYN    
 BTL  
 HX HEENT    
 HX WISDOM TEETH EXTRACTION Family History:  
Problem Relation Age of Onset  Cancer Father  Hypertension Mother  Heart Disease Paternal Grandfather  Alcohol abuse Neg Hx  Arthritis-rheumatoid Neg Hx  Asthma Neg Hx  Bleeding Prob Neg Hx  Diabetes Neg Hx  Elevated Lipids Neg Hx   
 Headache Neg Hx  Lung Disease Neg Hx  Migraines Neg Hx  Psychiatric Disorder Neg Hx  Stroke Neg Hx  Mental Retardation Neg Hx Social History Socioeconomic History  Marital status:  Spouse name: Not on file  Number of children: Not on file  Years of education: Not on file  Highest education level: Not on file Social Needs  Financial resource strain: Not on file  Food insecurity - worry: Not on file  Food insecurity - inability: Not on file  Transportation needs - medical: Not on file  Transportation needs - non-medical: Not on file Occupational History  Not on file Tobacco Use  Smoking status: Never Smoker  Smokeless tobacco: Never Used Substance and Sexual Activity  Alcohol use: Yes Alcohol/week: 1.8 - 3.0 oz Types: 3 - 5 Glasses of wine per week Comment: occ  Drug use: No  
 Sexual activity: Yes  
  Partners: Male Birth control/protection: Surgical  
Other Topics Concern   Service No  
 Blood Transfusions No  
 Caffeine Concern No  
 Occupational Exposure No  
 Hobby Hazards No  
 Sleep Concern Yes  Stress Concern Yes  Weight Concern Yes  Special Diet No  
 Back Care No  
 Exercise Yes  Bike Helmet No  
 Seat Belt Yes  Self-Exams Not Asked Social History Narrative  Not on file ALLERGIES: Naproxen Review of Systems Constitutional: Negative for chills and fever. Respiratory: Negative for cough and shortness of breath. Cardiovascular: Positive for chest pain and palpitations. Negative for leg swelling. Gastrointestinal: Negative for abdominal pain, diarrhea, nausea and vomiting. Musculoskeletal: Negative for back pain. Neurological: Negative for weakness and headaches. All other systems reviewed and are negative. Vitals:  
 03/03/19 2335 BP: 131/79 Resp: 16 SpO2: 99% Weight: 61.2 kg (135 lb) Height: 5' 7\" (1.702 m) Physical Exam  
Constitutional: She is oriented to person, place, and time. She appears well-developed and well-nourished. No distress. HENT:  
Head: Normocephalic and atraumatic. Eyes: Conjunctivae are normal. No scleral icterus. Neck: Neck supple. No tracheal deviation present. Cardiovascular: Normal rate, regular rhythm, normal heart sounds and intact distal pulses. Exam reveals no gallop and no friction rub. No murmur heard. Pulmonary/Chest: Effort normal and breath sounds normal. She has no wheezes. She has no rales. Abdominal: Soft. She exhibits no distension. There is no tenderness. There is no rebound and no guarding. Musculoskeletal: She exhibits no edema. Neurological: She is alert and oriented to person, place, and time. Skin: Skin is warm and dry. No rash noted. Psychiatric: She has a normal mood and affect. Nursing note and vitals reviewed. MDM Procedures ED EKG interpretation: 
Rhythm: normal sinus rhythm; and regular . Rate (approx.): 79; Axis: normal; ST/T wave: normal; This EKG was interpreted by Valente Rios MD,ED Provider. Progress Note: 
Results, treatment, and follow up plan have been discussed with patient/boyfriend. Questions were answered. Valente Rios MD 
12:51 AM 
 
 A/P: intermittent CP today - does not seem c/w cardiac etiology; reassuring appearance and exam; VSS; ;CBC, CMP, trop, EKG, CXR ok; home with PCP/cards f/u.   Valente Rios MD 
12:52 AM

## 2019-03-04 NOTE — ED TRIAGE NOTES
Patient arrives via EMS for complaints of intermittent chest pain and dizziness that started about 11 am. Denies nausea, vomiting , or shortness or breath.   
 
Patient took 324 asa prior to arrival

## 2019-03-05 ENCOUNTER — OFFICE VISIT (OUTPATIENT)
Dept: FAMILY MEDICINE CLINIC | Age: 54
End: 2019-03-05

## 2019-03-05 VITALS
HEART RATE: 71 BPM | TEMPERATURE: 97.2 F | DIASTOLIC BLOOD PRESSURE: 75 MMHG | HEIGHT: 67 IN | SYSTOLIC BLOOD PRESSURE: 119 MMHG | WEIGHT: 135 LBS | BODY MASS INDEX: 21.19 KG/M2 | OXYGEN SATURATION: 97 % | RESPIRATION RATE: 16 BRPM

## 2019-03-05 DIAGNOSIS — J02.9 SORE THROAT: ICD-10-CM

## 2019-03-05 DIAGNOSIS — R07.9 CHEST PAIN, UNSPECIFIED TYPE: Primary | ICD-10-CM

## 2019-03-05 DIAGNOSIS — Z87.09 HISTORY OF STREP PHARYNGITIS: ICD-10-CM

## 2019-03-05 LAB — S PYO AG THROAT QL: NEGATIVE

## 2019-03-05 NOTE — PATIENT INSTRUCTIONS

## 2019-03-05 NOTE — PROGRESS NOTES
HISTORY OF PRESENT ILLNESS  Will Shankar is a 48 y.o. female. HPI   Pt presents with \"ER follow up and sore throat\"    Pt was seen a the [facility] ER on 3/3, for chest pain. EKG was normal.  Pt was informed to follow up with cardiology, and she has an appointment tomorrow with Dr Kat Borjas. Pt states that pain has continued to come and go, in the center and left side of her chest  No shortness of breath with the pain  No nausea    In addition, yesterday, patient noted a sore throat  Throat feels swollen  She feels warm, but no documented fever  No nausea, no vomiting, no diarrhea  OTC: none  Review of Systems   Constitutional: Negative for fever. HENT: Positive for sore throat. Respiratory: Negative for cough and shortness of breath. Cardiovascular: Positive for chest pain. Gastrointestinal: Negative for diarrhea and vomiting. Physical Exam   Constitutional: She is oriented to person, place, and time. She appears well-developed and well-nourished. HENT:   Head: Normocephalic and atraumatic. Right Ear: Hearing, tympanic membrane, external ear and ear canal normal.   Left Ear: Hearing, tympanic membrane, external ear and ear canal normal.   Nose: Nose normal.   Mouth/Throat: Posterior oropharyngeal erythema present. Neck: Normal range of motion. Neck supple. Cardiovascular: Normal rate, regular rhythm and normal heart sounds. Pulmonary/Chest: Effort normal and breath sounds normal.   Lymphadenopathy:     She has no cervical adenopathy. Neurological: She is alert and oriented to person, place, and time. Skin: Skin is warm and dry. Psychiatric: She has a normal mood and affect. Her behavior is normal.       ASSESSMENT and PLAN    ICD-10-CM ICD-9-CM    1. Chest pain, unspecified type R07.9 786.50    2. Sore throat J02.9 462 AMB POC RAPID STREP TEST   3.  History of strep pharyngitis Z87.09 V12.09 AMB POC RAPID STREP TEST     Informed patient that she should keep appointment with cardiology for tomorrow, and she should go to the ER with ANY worsening of symptoms in the meantime  Educated about sore throat being viral in origin  Should monitor fever, and treat as needed  Educated about staying well hydrated, and treating fever as needed    Pt informed to return to office with worsening of symptoms, or PRN with any questions or concerns. Pt verbalizes understanding of plan of care and denies further questions or concerns at this time.

## 2019-03-05 NOTE — LETTER
NOTIFICATION RETURN TO WORK / SCHOOL 
 
3/5/2019 10:01 AM 
 
Ms. Beverly Riley [address] [place] [phone] To Whom It May Concern: 
 
Beverly Riley is currently under the care of [address] [place] [address]. She needs to be excused from work on 3/5 and 3/6, due to illness. If there are questions or concerns please have the patient contact our office. Sincerely, Radha Nguyen NP

## 2019-03-05 NOTE — PROGRESS NOTES
Identified pt with two pt identifiers(name and ). Chief Complaint   Patient presents with   [facility] Follow Up     was seen in ER on 19 for chest pain - pt reports ER advised her to see her cardiologist which she has scheduled to occur tomorrow    Sore Throat        Health Maintenance Due   Topic    Hepatitis C Screening     PAP AKA CERVICAL CYTOLOGY     Shingrix Vaccine Age 50> (1 of 2)    BREAST CANCER SCRN MAMMOGRAM     FOBT Q 1 YEAR AGE 50-75        Wt Readings from Last 3 Encounters:   19 135 lb (61.2 kg)   19 135 lb (61.2 kg)   19 139 lb (63 kg)     Temp Readings from Last 3 Encounters:   19 97.2 °F (36.2 °C) (Oral)   19 98.4 °F (36.9 °C)   19 97.4 °F (36.3 °C) (Oral)     BP Readings from Last 3 Encounters:   19 119/75   19 113/78   19 119/80     Pulse Readings from Last 3 Encounters:   19 71   19 65   19 64         Learning Assessment:  :     Learning Assessment 10/14/2015 3/12/2014   PRIMARY LEARNER Patient Patient   HIGHEST LEVEL OF EDUCATION - PRIMARY LEARNER  - SOME COLLEGE   BARRIERS PRIMARY LEARNER NONE NONE   CO-LEARNER CAREGIVER - No   PRIMARY LANGUAGE ENGLISH ENGLISH   LEARNER PREFERENCE PRIMARY READING READING   ANSWERED BY Jeana patient   RELATIONSHIP SELF SELF       Depression Screening:  :     3 most recent PHQ Screens 10/24/2018   Little interest or pleasure in doing things Not at all   Feeling down, depressed, irritable, or hopeless Not at all   Total Score PHQ 2 0       Fall Risk Assessment:  :     No flowsheet data found. Abuse Screening:  :     Abuse Screening Questionnaire 3/1/2019 3/12/2014   Do you ever feel afraid of your partner? N N   Are you in a relationship with someone who physically or mentally threatens you? N N   Is it safe for you to go home? Y Y       Coordination of Care Questionnaire:  :     1) Have you been to an emergency room, urgent care clinic since your last visit?  yes Hospitalized since your last visit? no             2) Have you seen or consulted any other health care providers outside of [address] [place] [address] since your last visit? no  (Include any pap smears or colon screenings in this section.)    3) Do you have an Advance Directive on file? no  Are you interested in receiving information about Advance Directives? no    Patient is accompanied by self. Reviewed record in preparation for visit and have obtained necessary documentation. Medication reconciliation up to date and corrected with patient at this time. Order for POC Rapid Strep Testing placed per Carley Butcher's verbal order.

## 2019-03-07 ENCOUNTER — TELEPHONE (OUTPATIENT)
Dept: FAMILY MEDICINE CLINIC | Age: 54
End: 2019-03-07

## 2019-03-07 NOTE — TELEPHONE ENCOUNTER
The pt is calling because she was in two days ago with what she was told was viral, she is now coughing up yellow/green mucus and wants to make sure that she doesn't need any antibiotic with that.      Thanks

## 2019-03-07 NOTE — TELEPHONE ENCOUNTER
Pt was advised via TM on [phone] per NPGuadalupe she would need an appt for evaluation of new sx.

## 2019-03-08 ENCOUNTER — OFFICE VISIT (OUTPATIENT)
Dept: FAMILY MEDICINE CLINIC | Age: 54
End: 2019-03-08

## 2019-03-08 VITALS
BODY MASS INDEX: 21.19 KG/M2 | DIASTOLIC BLOOD PRESSURE: 82 MMHG | SYSTOLIC BLOOD PRESSURE: 126 MMHG | WEIGHT: 135 LBS | HEART RATE: 80 BPM | HEIGHT: 67 IN | RESPIRATION RATE: 18 BRPM

## 2019-03-08 DIAGNOSIS — J06.9 URI WITH COUGH AND CONGESTION: Primary | ICD-10-CM

## 2019-03-08 RX ORDER — AZITHROMYCIN 250 MG/1
TABLET, FILM COATED ORAL
Qty: 6 TAB | Refills: 0 | Status: SHIPPED | OUTPATIENT
Start: 2019-03-08 | End: 2019-03-13 | Stop reason: ALTCHOICE

## 2019-03-08 NOTE — LETTER
NOTIFICATION RETURN TO WORK / SCHOOL 
 
3/8/2019 1:27 PM 
 
Ms. Celestino Hall [address] [place] [phone] To Whom It May Concern: 
 
Celestino Hall is currently under the care of [address] [place] [address]. She needs to be excused from work on 3/8, due to illness. If there are questions or concerns please have the patient contact our office. Sincerely, Devan Diallo, NP

## 2019-03-08 NOTE — PATIENT INSTRUCTIONS

## 2019-03-09 ENCOUNTER — TELEPHONE (OUTPATIENT)
Dept: FAMILY MEDICINE CLINIC | Age: 54
End: 2019-03-09

## 2019-03-09 NOTE — TELEPHONE ENCOUNTER
Phone call with patient. She has a URI and was started on erythromycin yesterday. This am, she started feeling lightheaded and weak 30 minutes after taking her dose. Reassured her that this is not due to her medication, but likely illness related, and to continue taking it. Advised to rest and push fluids.

## 2019-03-12 ENCOUNTER — TELEPHONE (OUTPATIENT)
Dept: FAMILY MEDICINE CLINIC | Age: 54
End: 2019-03-12

## 2019-03-12 NOTE — TELEPHONE ENCOUNTER
Pt called stating that she was in last Friday for URI and hasn't returned to work since due to still not feeling better. She is asking for a work note from Friday, 3/8/19-3/12/19, but made an appt for tomorrow if it is needed.

## 2019-03-13 ENCOUNTER — OFFICE VISIT (OUTPATIENT)
Dept: FAMILY MEDICINE CLINIC | Age: 54
End: 2019-03-13

## 2019-03-13 VITALS
DIASTOLIC BLOOD PRESSURE: 80 MMHG | TEMPERATURE: 96.9 F | RESPIRATION RATE: 16 BRPM | SYSTOLIC BLOOD PRESSURE: 125 MMHG | HEIGHT: 67 IN | WEIGHT: 135 LBS | BODY MASS INDEX: 21.19 KG/M2 | OXYGEN SATURATION: 99 % | HEART RATE: 67 BPM

## 2019-03-13 DIAGNOSIS — R42 DIZZINESS: Primary | ICD-10-CM

## 2019-03-13 DIAGNOSIS — R53.83 FATIGUE, UNSPECIFIED TYPE: ICD-10-CM

## 2019-03-13 NOTE — PROGRESS NOTES
Identified pt with two pt identifiers(name and ). Chief Complaint   Patient presents with    URI     Is not feeling better since last week when she was seen on 2019 and as a result pt has not returned to work yet.  Dizziness        Health Maintenance Due   Topic    Hepatitis C Screening     PAP AKA CERVICAL CYTOLOGY     Shingrix Vaccine Age 50> (1 of 2)    FOBT Q 1 YEAR AGE 50-75        Wt Readings from Last 3 Encounters:   19 135 lb (61.2 kg)   19 135 lb (61.2 kg)   19 135 lb (61.2 kg)     Temp Readings from Last 3 Encounters:   19 96.9 °F (36.1 °C) (Oral)   19 97.2 °F (36.2 °C) (Oral)   19 98.4 °F (36.9 °C)     BP Readings from Last 3 Encounters:   19 125/80   19 126/82   19 119/75     Pulse Readings from Last 3 Encounters:   19 67   19 80   19 71         Learning Assessment:  :     Learning Assessment 10/14/2015 3/12/2014   PRIMARY LEARNER Patient Patient   HIGHEST LEVEL OF EDUCATION - PRIMARY LEARNER  - SOME COLLEGE   BARRIERS PRIMARY LEARNER NONE NONE   CO-LEARNER CAREGIVER - No   PRIMARY LANGUAGE ENGLISH ENGLISH   LEARNER PREFERENCE PRIMARY READING READING   ANSWERED BY Jeana patient   RELATIONSHIP SELF SELF       Depression Screening:  :     3 most recent PHQ Screens 10/24/2018   Little interest or pleasure in doing things Not at all   Feeling down, depressed, irritable, or hopeless Not at all   Total Score PHQ 2 0       Fall Risk Assessment:  :     No flowsheet data found. Abuse Screening:  :     Abuse Screening Questionnaire 3/1/2019 3/12/2014   Do you ever feel afraid of your partner? N N   Are you in a relationship with someone who physically or mentally threatens you? N N   Is it safe for you to go home?  Y Y         Coordination of Care Questionnaire:  :     1) Have you been to an emergency room, urgent care clinic since your last visit? no   Hospitalized since your last visit? no             2) Have you seen or consulted any other health care providers outside of [address] [place] [address] since your last visit? no  (Include any pap smears or colon screenings in this section.)    3) Do you have an Advance Directive on file? no  Are you interested in receiving information about Advance Directives? no    Patient is accompanied by self. Reviewed record in preparation for visit and have obtained necessary documentation. Medication reconciliation up to date and corrected with patient at this time.

## 2019-03-13 NOTE — PATIENT INSTRUCTIONS
Dizziness: Care Instructions  Your Care Instructions  Dizziness is the feeling of unsteadiness or fuzziness in your head. It is different than having vertigo, which is a feeling that the room is spinning or that you are moving or falling. It is also different from lightheadedness, which is the feeling that you are about to faint. It can be hard to know what causes dizziness. Some people feel dizzy when they have migraine headaches. Sometimes bouts of flu can make you feel dizzy. Some medical conditions, such as heart problems or high blood pressure, can make you feel dizzy. Many medicines can cause dizziness, including medicines for high blood pressure, pain, or anxiety. If a medicine causes your symptoms, your doctor may recommend that you stop or change the medicine. If it is a problem with your heart, you may need medicine to help your heart work better. If there is no clear reason for your symptoms, your doctor may suggest watching and waiting for a while to see if the dizziness goes away on its own. Follow-up care is a key part of your treatment and safety. Be sure to make and go to all appointments, and call your doctor if you are having problems. It's also a good idea to know your test results and keep a list of the medicines you take. How can you care for yourself at home? · If your doctor recommends or prescribes medicine, take it exactly as directed. Call your doctor if you think you are having a problem with your medicine. · Do not drive while you feel dizzy. · Try to prevent falls. Steps you can take include:  ? Using nonskid mats, adding grab bars near the tub, and using night-lights. ? Clearing your home so that walkways are free of anything you might trip on.  ? Letting family and friends know that you have been feeling dizzy. This will help them know how to help you. When should you call for help? Call 911 anytime you think you may need emergency care.  For example, call if:    · You passed out (lost consciousness).     · You have dizziness along with symptoms of a heart attack. These may include:  ? Chest pain or pressure, or a strange feeling in the chest.  ? Sweating. ? Shortness of breath. ? Nausea or vomiting. ? Pain, pressure, or a strange feeling in the back, neck, jaw, or upper belly or in one or both shoulders or arms. ? Lightheadedness or sudden weakness. ? A fast or irregular heartbeat.     · You have symptoms of a stroke. These may include:  ? Sudden numbness, tingling, weakness, or loss of movement in your face, arm, or leg, especially on only one side of your body. ? Sudden vision changes. ? Sudden trouble speaking. ? Sudden confusion or trouble understanding simple statements. ? Sudden problems with walking or balance. ? A sudden, severe headache that is different from past headaches.    Call your doctor now or seek immediate medical care if:    · You feel dizzy and have a fever, headache, or ringing in your ears.     · You have new or increased nausea and vomiting.     · Your dizziness does not go away or comes back.    Watch closely for changes in your health, and be sure to contact your doctor if:    · You do not get better as expected. Where can you learn more? Go to http://tara-pat.info/. Enter S217 in the search box to learn more about \"Dizziness: Care Instructions. \"  Current as of: September 23, 2018  Content Version: 11.9  © 2124-3948 [employer]. Care instructions adapted under license by [employer] (which disclaims liability or warranty for this information). If you have questions about a medical condition or this instruction, always ask your healthcare professional. [place] [address] any warranty or liability for your use of this information.

## 2019-03-13 NOTE — PROGRESS NOTES
HISTORY OF PRESENT ILLNESS  Roel Malhotra is a 48 y.o. female. HPI  Pt presents with \"continued URI symptoms\"    Pt was seen on 3/8 for URI, and was placed on a z-pack. 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 Review of Systems   Constitutional: Positive for malaise/fatigue. Negative for fever. HENT: Negative for congestion. Respiratory: Negative for cough. Gastrointestinal: Negative for diarrhea and vomiting. Neurological: Positive for dizziness. Physical Exam   Constitutional: She is oriented to person, place, and time. She appears well-developed and well-nourished. HENT:   Head: Normocephalic and atraumatic. Right Ear: Hearing, tympanic membrane, external ear and ear canal normal.   Left Ear: Hearing, tympanic membrane, external ear and ear canal normal.   Nose: Mucosal edema present. Mouth/Throat: Oropharynx is clear and moist.   Neck: Normal range of motion. Neck supple. Cardiovascular: Normal rate, regular rhythm and normal heart sounds. Pulmonary/Chest: Effort normal and breath sounds normal. She has no wheezes. She has no rales. Lymphadenopathy:     She has no cervical adenopathy. Neurological: She is alert and oriented to person, place, and time. Skin: Skin is warm and dry. Psychiatric: She has a normal mood and affect. Her behavior is normal.       ASSESSMENT and PLAN    ICD-10-CM ICD-9-CM    1. Dizziness R42 780.4 CBC WITH AUTOMATED DIFF      METABOLIC PANEL, COMPREHENSIVE      THYROID CASCADE PROFILE   2.  Fatigue, unspecified type R53.83 780.79 CBC WITH AUTOMATED DIFF      METABOLIC PANEL, COMPREHENSIVE      THYROID CASCADE PROFILE     Informed patient that we will notify her when her labs return  It is reassuring that assessment is within normal limits    Pt informed to return to office with worsening of symptoms, or PRN with any questions or concerns. Pt verbalizes understanding of plan of care and denies further questions or concerns at this time.

## 2019-03-13 NOTE — LETTER
NOTIFICATION RETURN TO WORK / SCHOOL 
 
3/13/2019 8:41 AM 
 
Ms. Pepe Ayala [address] [facility] [phone] To Whom It May Concern: 
 
Pepe Ayala is currently under the care of [address] [place] [address]. She needs to be excused from work from 3/11-3/14, due to illness. If there are questions or concerns please have the patient contact our office. Sincerely, Shantel Butts NP

## 2019-03-14 LAB
ALBUMIN SERPL-MCNC: 4.4 G/DL (ref 3.5–5.5)
ALBUMIN/GLOB SERPL: 1.9 {RATIO} (ref 1.2–2.2)
ALP SERPL-CCNC: 67 IU/L (ref 39–117)
ALT SERPL-CCNC: 15 IU/L (ref 0–32)
AST SERPL-CCNC: 20 IU/L (ref 0–40)
BASOPHILS # BLD AUTO: 0 X10E3/UL (ref 0–0.2)
BASOPHILS NFR BLD AUTO: 1 %
BILIRUB SERPL-MCNC: 0.3 MG/DL (ref 0–1.2)
BUN SERPL-MCNC: 12 MG/DL (ref 6–24)
BUN/CREAT SERPL: 20 (ref 9–23)
CALCIUM SERPL-MCNC: 9.1 MG/DL (ref 8.7–10.2)
CHLORIDE SERPL-SCNC: 105 MMOL/L (ref 96–106)
CO2 SERPL-SCNC: 22 MMOL/L (ref 20–29)
CREAT SERPL-MCNC: 0.61 MG/DL (ref 0.57–1)
EOSINOPHIL # BLD AUTO: 0.1 X10E3/UL (ref 0–0.4)
EOSINOPHIL NFR BLD AUTO: 3 %
ERYTHROCYTE [DISTWIDTH] IN BLOOD BY AUTOMATED COUNT: 12.9 % (ref 12.3–15.4)
GLOBULIN SER CALC-MCNC: 2.3 G/DL (ref 1.5–4.5)
GLUCOSE SERPL-MCNC: 93 MG/DL (ref 65–99)
HCT VFR BLD AUTO: 40.9 % (ref 34–46.6)
HGB BLD-MCNC: 13.5 G/DL (ref 11.1–15.9)
IMM GRANULOCYTES # BLD AUTO: 0 X10E3/UL (ref 0–0.1)
IMM GRANULOCYTES NFR BLD AUTO: 0 %
LYMPHOCYTES # BLD AUTO: 2 X10E3/UL (ref 0.7–3.1)
LYMPHOCYTES NFR BLD AUTO: 47 %
MCH RBC QN AUTO: 30.7 PG (ref 26.6–33)
MCHC RBC AUTO-ENTMCNC: 33 G/DL (ref 31.5–35.7)
MCV RBC AUTO: 93 FL (ref 79–97)
MONOCYTES # BLD AUTO: 0.3 X10E3/UL (ref 0.1–0.9)
MONOCYTES NFR BLD AUTO: 8 %
NEUTROPHILS # BLD AUTO: 1.7 X10E3/UL (ref 1.4–7)
NEUTROPHILS NFR BLD AUTO: 41 %
PLATELET # BLD AUTO: 246 X10E3/UL (ref 150–379)
POTASSIUM SERPL-SCNC: 4.5 MMOL/L (ref 3.5–5.2)
PROT SERPL-MCNC: 6.7 G/DL (ref 6–8.5)
RBC # BLD AUTO: 4.4 X10E6/UL (ref 3.77–5.28)
SODIUM SERPL-SCNC: 143 MMOL/L (ref 134–144)
TSH SERPL DL<=0.005 MIU/L-ACNC: 2.42 UIU/ML (ref 0.45–4.5)
WBC # BLD AUTO: 4.2 X10E3/UL (ref 3.4–10.8)

## 2019-05-13 DIAGNOSIS — F41.9 ANXIETY: ICD-10-CM

## 2019-05-13 DIAGNOSIS — F41.0 PANIC DISORDER WITHOUT AGORAPHOBIA: ICD-10-CM

## 2019-05-13 RX ORDER — CLONAZEPAM 0.5 MG/1
0.5 TABLET ORAL
Qty: 20 TAB | Refills: 0 | OUTPATIENT
Start: 2019-05-13

## 2019-05-16 ENCOUNTER — OFFICE VISIT (OUTPATIENT)
Dept: FAMILY MEDICINE CLINIC | Age: 54
End: 2019-05-16

## 2019-05-16 VITALS
DIASTOLIC BLOOD PRESSURE: 76 MMHG | BODY MASS INDEX: 20.75 KG/M2 | TEMPERATURE: 98.9 F | HEART RATE: 77 BPM | HEIGHT: 67 IN | WEIGHT: 132.2 LBS | OXYGEN SATURATION: 99 % | RESPIRATION RATE: 16 BRPM | SYSTOLIC BLOOD PRESSURE: 112 MMHG

## 2019-05-16 DIAGNOSIS — G89.29 CHRONIC PAIN OF RIGHT KNEE: ICD-10-CM

## 2019-05-16 DIAGNOSIS — F41.9 ANXIETY: Primary | ICD-10-CM

## 2019-05-16 DIAGNOSIS — M25.561 CHRONIC PAIN OF RIGHT KNEE: ICD-10-CM

## 2019-05-16 DIAGNOSIS — F41.0 PANIC DISORDER WITHOUT AGORAPHOBIA: ICD-10-CM

## 2019-05-16 RX ORDER — CITALOPRAM 20 MG/1
20 TABLET, FILM COATED ORAL DAILY
Qty: 90 TAB | Refills: 1 | Status: SHIPPED | OUTPATIENT
Start: 2019-05-16 | End: 2020-03-16 | Stop reason: SDUPTHER

## 2019-05-16 RX ORDER — CLONAZEPAM 0.5 MG/1
0.5 TABLET ORAL
Qty: 20 TAB | Refills: 0 | Status: CANCELLED | OUTPATIENT
Start: 2019-05-16

## 2019-05-16 RX ORDER — CITALOPRAM 20 MG/1
TABLET, FILM COATED ORAL DAILY
COMMUNITY
End: 2019-05-16 | Stop reason: SDUPTHER

## 2019-05-16 NOTE — PROGRESS NOTES
HISTORY OF PRESENT ILLNESS  Alyssa Casarez is a 48 y.o. female. HPI   Pt presents with \"refills of Celexa\"    Pt states that she needs refills of her Celexa  This medication continues to work well for her, without negative side effects  In addition, she has noted some right knee pain, off and on  At times, it will appear swollen  She believes that this occurs when she over works the knee, but does not want x-ray or further work up at this time. Review of Systems   Constitutional: Negative for fever. HENT: Negative for congestion. Gastrointestinal: Negative for diarrhea and vomiting. Musculoskeletal: Positive for joint pain. Physical Exam   Constitutional: She is oriented to person, place, and time. She appears well-developed and well-nourished. HENT:   Head: Normocephalic and atraumatic. Cardiovascular: Normal rate, regular rhythm and normal heart sounds. Pulmonary/Chest: Effort normal and breath sounds normal.   Musculoskeletal:        Right knee: She exhibits normal range of motion and no swelling. No tenderness found. Neurological: She is alert and oriented to person, place, and time. Skin: Skin is warm and dry. Psychiatric: She has a normal mood and affect. Her behavior is normal.       ASSESSMENT and PLAN    ICD-10-CM ICD-9-CM    1. Anxiety F41.9 300.00 citalopram (CELEXA) 20 mg tablet   2. Panic disorder without agoraphobia F41.0 300.01    3. Chronic pain of right knee M25.561 719.46     G89.29 338.29      Educated about taking medication as prescribed  Should knee pain continue and/or worsen, educated about notifying office    Pt informed to return to office with worsening of symptoms, or PRN with any questions or concerns. Pt verbalizes understanding of plan of care and denies further questions or concerns at this time.

## 2019-05-16 NOTE — PATIENT INSTRUCTIONS

## 2019-05-16 NOTE — PROGRESS NOTES
All health maintenance and other pertinent information has been reviewed in preparation for today's office visit. Patient presents in the office today for:    Chief Complaint   Patient presents with    Back Pain     Pt states she has right sided lower back pain (above buttocks)  States it is tender to touch. Onset:  Problem is chronic and has been occuring for years.  Knee Pain     Right Knee Pain-states she has water on her knee.  Anxiety    Medication Refill       1. Have you been to the ER, urgent care clinic since your last visit? Hospitalized since your last visit? No    2. Have you seen or consulted any other health care providers outside of the [address] [place] [address] since your last visit? Include any pap smears or colon screening.  No

## 2019-06-21 ENCOUNTER — OFFICE VISIT (OUTPATIENT)
Dept: FAMILY MEDICINE CLINIC | Age: 54
End: 2019-06-21

## 2019-06-21 VITALS
BODY MASS INDEX: 20.4 KG/M2 | RESPIRATION RATE: 18 BRPM | SYSTOLIC BLOOD PRESSURE: 110 MMHG | DIASTOLIC BLOOD PRESSURE: 72 MMHG | WEIGHT: 130 LBS | HEIGHT: 67 IN | HEART RATE: 80 BPM

## 2019-06-21 DIAGNOSIS — R53.83 FATIGUE, UNSPECIFIED TYPE: Primary | ICD-10-CM

## 2019-06-21 DIAGNOSIS — R42 DIZZINESS: ICD-10-CM

## 2019-06-21 DIAGNOSIS — Z13.1 SCREENING FOR DIABETES MELLITUS: ICD-10-CM

## 2019-06-21 DIAGNOSIS — Z13.29 SCREENING FOR THYROID DISORDER: ICD-10-CM

## 2019-06-21 DIAGNOSIS — R55 SYNCOPE AND COLLAPSE: ICD-10-CM

## 2019-06-21 NOTE — PROGRESS NOTES
HISTORY OF PRESENT ILLNESS  Markus Queen is a 48 y.o. female. HPI   Pt presents with \"fatigue\"  Pt states that she has been dealing with fatigue and light headedness for a couple of months. She has dealt with anemia in the past, so assumed that her symptoms were in relation to this? She states that over the past week, it has been worsening. She woke up 3 days ago, and was very dizzy. She had bout of diarrhea, which is not that uncommon for her, and then when she was leaving the bathroom, she fell to the floor. She did not lose consciousness, nor hit her head. Pt states that she felt weak, and that is why she fell. She laid there for a few minutes, and then got up, and felt better. Then, 2 nights ago, she also had diarrhea, and weakness after dinner. Today, she is feeling fatigued and light headed as well. No chest pain, no shortness of breath. Pt is being followed by GI, for increase in diarrhea, already. Pt denies fever  Review of Systems   Constitutional: Positive for malaise/fatigue. Negative for fever. Gastrointestinal: Positive for diarrhea. Negative for vomiting. Neurological: Positive for dizziness. Negative for headaches. Physical Exam   Constitutional: She is oriented to person, place, and time. She appears well-developed and well-nourished. HENT:   Head: Normocephalic and atraumatic. Eyes: Pupils are equal, round, and reactive to light. EOM are normal.   Neck: Normal range of motion. Neck supple. Cardiovascular: Normal rate, regular rhythm, normal heart sounds and intact distal pulses. Pulmonary/Chest: Effort normal and breath sounds normal.   Lymphadenopathy:     She has no cervical adenopathy. Neurological: She is alert and oriented to person, place, and time. Skin: Skin is warm and dry. Psychiatric: She has a normal mood and affect. Her behavior is normal.       ASSESSMENT and PLAN    ICD-10-CM ICD-9-CM    1.  Fatigue, unspecified type R53.83 780.79 AMB POC EKG ROUTINE W/ 12 LEADS, INTER & REP      CBC WITH AUTOMATED DIFF      METABOLIC PANEL, COMPREHENSIVE   2. Dizziness R42 780.4    3. Screening for thyroid disorder Z13.29 V77.0 THYROID CASCADE PROFILE   4. Screening for diabetes mellitus Z13.1 V77.1 HEMOGLOBIN A1C WITH EAG     Informed patient that we will notify her when her labs return  Educated about ALWAYS going to ER or calling 911 , with worsening of symptoms, LOC, fainting, etc.  Will have her follow up with cardiology, based on symptoms. Educated about calling for an appointment today. Pt informed to return to office with worsening of symptoms, or PRN with any questions or concerns. Pt verbalizes understanding of plan of care and denies further questions or concerns at this time.

## 2019-06-21 NOTE — PATIENT INSTRUCTIONS
Dizziness: Care Instructions  Your Care Instructions  Dizziness is the feeling of unsteadiness or fuzziness in your head. It is different than having vertigo, which is a feeling that the room is spinning or that you are moving or falling. It is also different from lightheadedness, which is the feeling that you are about to faint. It can be hard to know what causes dizziness. Some people feel dizzy when they have migraine headaches. Sometimes bouts of flu can make you feel dizzy. Some medical conditions, such as heart problems or high blood pressure, can make you feel dizzy. Many medicines can cause dizziness, including medicines for high blood pressure, pain, or anxiety. If a medicine causes your symptoms, your doctor may recommend that you stop or change the medicine. If it is a problem with your heart, you may need medicine to help your heart work better. If there is no clear reason for your symptoms, your doctor may suggest watching and waiting for a while to see if the dizziness goes away on its own. Follow-up care is a key part of your treatment and safety. Be sure to make and go to all appointments, and call your doctor if you are having problems. It's also a good idea to know your test results and keep a list of the medicines you take. How can you care for yourself at home? · If your doctor recommends or prescribes medicine, take it exactly as directed. Call your doctor if you think you are having a problem with your medicine. · Do not drive while you feel dizzy. · Try to prevent falls. Steps you can take include:  ? Using nonskid mats, adding grab bars near the tub, and using night-lights. ? Clearing your home so that walkways are free of anything you might trip on.  ? Letting family and friends know that you have been feeling dizzy. This will help them know how to help you. When should you call for help? Call 911 anytime you think you may need emergency care.  For example, call if:    · You passed out (lost consciousness).     · You have dizziness along with symptoms of a heart attack. These may include:  ? Chest pain or pressure, or a strange feeling in the chest.  ? Sweating. ? Shortness of breath. ? Nausea or vomiting. ? Pain, pressure, or a strange feeling in the back, neck, jaw, or upper belly or in one or both shoulders or arms. ? Lightheadedness or sudden weakness. ? A fast or irregular heartbeat.     · You have symptoms of a stroke. These may include:  ? Sudden numbness, tingling, weakness, or loss of movement in your face, arm, or leg, especially on only one side of your body. ? Sudden vision changes. ? Sudden trouble speaking. ? Sudden confusion or trouble understanding simple statements. ? Sudden problems with walking or balance. ? A sudden, severe headache that is different from past headaches.    Call your doctor now or seek immediate medical care if:    · You feel dizzy and have a fever, headache, or ringing in your ears.     · You have new or increased nausea and vomiting.     · Your dizziness does not go away or comes back.    Watch closely for changes in your health, and be sure to contact your doctor if:    · You do not get better as expected. Where can you learn more? Go to http://tara-pat.info/. Enter S743 in the search box to learn more about \"Dizziness: Care Instructions. \"  Current as of: September 23, 2018  Content Version: 11.9  © 4320-3696 [employer]. Care instructions adapted under license by [employer] (which disclaims liability or warranty for this information). If you have questions about a medical condition or this instruction, always ask your healthcare professional. [place] [address] any warranty or liability for your use of this information.

## 2019-06-22 ENCOUNTER — HOSPITAL ENCOUNTER (EMERGENCY)
Age: 54
Discharge: HOME | End: 2019-06-22
Attending: STUDENT IN AN ORGANIZED HEALTH CARE EDUCATION/TRAINING PROGRAM
Payer: COMMERCIAL

## 2019-06-22 VITALS
HEART RATE: 62 BPM | SYSTOLIC BLOOD PRESSURE: 122 MMHG | DIASTOLIC BLOOD PRESSURE: 72 MMHG | WEIGHT: 130 LBS | HEIGHT: 67 IN | BODY MASS INDEX: 20.4 KG/M2 | OXYGEN SATURATION: 97 % | TEMPERATURE: 98.2 F | RESPIRATION RATE: 11 BRPM

## 2019-06-22 DIAGNOSIS — R53.81 MALAISE AND FATIGUE: Primary | ICD-10-CM

## 2019-06-22 DIAGNOSIS — R00.2 INTERMITTENT PALPITATIONS: ICD-10-CM

## 2019-06-22 DIAGNOSIS — R53.83 MALAISE AND FATIGUE: Primary | ICD-10-CM

## 2019-06-22 LAB
ALBUMIN SERPL-MCNC: 4 G/DL (ref 3.5–5)
ALBUMIN SERPL-MCNC: 4.7 G/DL (ref 3.5–5.5)
ALBUMIN/GLOB SERPL: 1.4 {RATIO} (ref 1.1–2.2)
ALBUMIN/GLOB SERPL: 2.5 {RATIO} (ref 1.2–2.2)
ALP SERPL-CCNC: 65 U/L (ref 45–117)
ALP SERPL-CCNC: 67 IU/L (ref 39–117)
ALT SERPL-CCNC: 13 IU/L (ref 0–32)
ALT SERPL-CCNC: 17 U/L (ref 12–78)
ANION GAP SERPL CALC-SCNC: 8 MMOL/L (ref 5–15)
AST SERPL-CCNC: 14 U/L (ref 15–37)
AST SERPL-CCNC: 17 IU/L (ref 0–40)
BASOPHILS # BLD AUTO: 0 X10E3/UL (ref 0–0.2)
BASOPHILS # BLD: 0 K/UL (ref 0–0.1)
BASOPHILS NFR BLD AUTO: 1 %
BASOPHILS NFR BLD: 1 % (ref 0–1)
BILIRUB SERPL-MCNC: 0.5 MG/DL (ref 0.2–1)
BILIRUB SERPL-MCNC: 0.5 MG/DL (ref 0–1.2)
BUN SERPL-MCNC: 10 MG/DL (ref 6–24)
BUN SERPL-MCNC: 12 MG/DL (ref 6–20)
BUN/CREAT SERPL: 16 (ref 9–23)
BUN/CREAT SERPL: 17 (ref 12–20)
CALCIUM SERPL-MCNC: 8.9 MG/DL (ref 8.5–10.1)
CALCIUM SERPL-MCNC: 9.7 MG/DL (ref 8.7–10.2)
CHLORIDE SERPL-SCNC: 103 MMOL/L (ref 96–106)
CHLORIDE SERPL-SCNC: 108 MMOL/L (ref 97–108)
CO2 SERPL-SCNC: 24 MMOL/L (ref 21–32)
CO2 SERPL-SCNC: 25 MMOL/L (ref 20–29)
COMMENT, HOLDF: NORMAL
CREAT SERPL-MCNC: 0.63 MG/DL (ref 0.57–1)
CREAT SERPL-MCNC: 0.71 MG/DL (ref 0.55–1.02)
DIFFERENTIAL METHOD BLD: NORMAL
EOSINOPHIL # BLD AUTO: 0.2 X10E3/UL (ref 0–0.4)
EOSINOPHIL # BLD: 0.2 K/UL (ref 0–0.4)
EOSINOPHIL NFR BLD AUTO: 3 %
EOSINOPHIL NFR BLD: 4 % (ref 0–7)
ERYTHROCYTE [DISTWIDTH] IN BLOOD BY AUTOMATED COUNT: 12.2 % (ref 11.5–14.5)
ERYTHROCYTE [DISTWIDTH] IN BLOOD BY AUTOMATED COUNT: 13.2 % (ref 12.3–15.4)
EST. AVERAGE GLUCOSE BLD GHB EST-MCNC: 105 MG/DL
GLOBULIN SER CALC-MCNC: 1.9 G/DL (ref 1.5–4.5)
GLOBULIN SER CALC-MCNC: 2.8 G/DL (ref 2–4)
GLUCOSE SERPL-MCNC: 106 MG/DL (ref 65–100)
GLUCOSE SERPL-MCNC: 94 MG/DL (ref 65–99)
HBA1C MFR BLD: 5.3 % (ref 4.8–5.6)
HCT VFR BLD AUTO: 40.4 % (ref 35–47)
HCT VFR BLD AUTO: 41.8 % (ref 34–46.6)
HGB BLD-MCNC: 13.4 G/DL (ref 11.1–15.9)
HGB BLD-MCNC: 13.7 G/DL (ref 11.5–16)
IMM GRANULOCYTES # BLD AUTO: 0 K/UL (ref 0–0.04)
IMM GRANULOCYTES # BLD AUTO: 0 X10E3/UL (ref 0–0.1)
IMM GRANULOCYTES NFR BLD AUTO: 0 %
IMM GRANULOCYTES NFR BLD AUTO: 0 % (ref 0–0.5)
LYMPHOCYTES # BLD AUTO: 1.8 X10E3/UL (ref 0.7–3.1)
LYMPHOCYTES # BLD: 1.3 K/UL (ref 0.8–3.5)
LYMPHOCYTES NFR BLD AUTO: 30 %
LYMPHOCYTES NFR BLD: 31 % (ref 12–49)
MCH RBC QN AUTO: 30.3 PG (ref 26.6–33)
MCH RBC QN AUTO: 31.4 PG (ref 26–34)
MCHC RBC AUTO-ENTMCNC: 32.1 G/DL (ref 31.5–35.7)
MCHC RBC AUTO-ENTMCNC: 33.9 G/DL (ref 30–36.5)
MCV RBC AUTO: 92.7 FL (ref 80–99)
MCV RBC AUTO: 95 FL (ref 79–97)
MONOCYTES # BLD AUTO: 0.4 X10E3/UL (ref 0.1–0.9)
MONOCYTES # BLD: 0.3 K/UL (ref 0–1)
MONOCYTES NFR BLD AUTO: 7 %
MONOCYTES NFR BLD: 8 % (ref 5–13)
NEUTROPHILS # BLD AUTO: 3.6 X10E3/UL (ref 1.4–7)
NEUTROPHILS NFR BLD AUTO: 59 %
NEUTS SEG # BLD: 2.3 K/UL (ref 1.8–8)
NEUTS SEG NFR BLD: 56 % (ref 32–75)
NRBC # BLD: 0 K/UL (ref 0–0.01)
NRBC BLD-RTO: 0 PER 100 WBC
PLATELET # BLD AUTO: 214 K/UL (ref 150–400)
PLATELET # BLD AUTO: 243 X10E3/UL (ref 150–450)
PMV BLD AUTO: 9.5 FL (ref 8.9–12.9)
POTASSIUM SERPL-SCNC: 3.4 MMOL/L (ref 3.5–5.1)
POTASSIUM SERPL-SCNC: 3.8 MMOL/L (ref 3.5–5.2)
PROT SERPL-MCNC: 6.6 G/DL (ref 6–8.5)
PROT SERPL-MCNC: 6.8 G/DL (ref 6.4–8.2)
RBC # BLD AUTO: 4.36 M/UL (ref 3.8–5.2)
RBC # BLD AUTO: 4.42 X10E6/UL (ref 3.77–5.28)
SAMPLES BEING HELD,HOLD: NORMAL
SODIUM SERPL-SCNC: 140 MMOL/L (ref 136–145)
SODIUM SERPL-SCNC: 141 MMOL/L (ref 134–144)
TROPONIN I SERPL-MCNC: <0.05 NG/ML
TSH SERPL DL<=0.005 MIU/L-ACNC: 2.43 UIU/ML (ref 0.45–4.5)
WBC # BLD AUTO: 4.2 K/UL (ref 3.6–11)
WBC # BLD AUTO: 6 X10E3/UL (ref 3.4–10.8)

## 2019-06-22 PROCEDURE — 84484 ASSAY OF TROPONIN QUANT: CPT

## 2019-06-22 PROCEDURE — 80053 COMPREHEN METABOLIC PANEL: CPT

## 2019-06-22 PROCEDURE — 93005 ELECTROCARDIOGRAM TRACING: CPT

## 2019-06-22 PROCEDURE — 36415 COLL VENOUS BLD VENIPUNCTURE: CPT

## 2019-06-22 PROCEDURE — 99284 EMERGENCY DEPT VISIT MOD MDM: CPT

## 2019-06-22 PROCEDURE — 85025 COMPLETE CBC W/AUTO DIFF WBC: CPT

## 2019-06-22 NOTE — ED PROVIDER NOTES
48 y.o. female with past medical history significant for GERD, hypothyroidism, TIA, arthritis, PUD, and hiatal hernia who presents from EMS with chief complaint of palpitations. Pt reports palpitations, which began at 0530 Tuesday morning. Pt c/o an episode of palpitations at 0545 this morning. Pt notes accompanying diaphoresis and lightheadedness. Per EMS, Pt received aspirin PTA. Pt notes she was evaluated by her PCP yesterday and had an EKG. Pt notes her PCP was concerned for an abnormal EKG and referred Pt to Cardiology. Pt reports diarrhea a few days ago. Pt states she wore a cardiac event monitor in 03/2019. Pt denies any recent medication changes. Pt currently denies chest pain or palpitations. Pt denies SOB, leg swelling, blood in stool, cough, or fever. There are no other acute medical concerns at this time. PCP: Jani Phalen, NP    Note written by Bruno Brittle, Scribe, as dictated by Susanna Escamilla MD 7:55 AM      The history is provided by the patient and the EMS personnel. No  was used.         Past Medical History:   Diagnosis Date    Anxiety     Arthritis     Chronic neck pain 6/27/2015    Fatigue     GERD (gastroesophageal reflux disease) 3/12/2014    Headache     Hiatal hernia     PUD (peptic ulcer disease)     TIA (transient ischemic attack) 6/27/2015    Unspecified hypothyroidism 12/19/2012       Past Surgical History:   Procedure Laterality Date    HX GYN      BTL    HX HEENT      HX WISDOM TEETH EXTRACTION           Family History:   Problem Relation Age of Onset    Cancer Father     Hypertension Mother     Heart Disease Paternal Grandfather     Alcohol abuse Neg Hx     Arthritis-rheumatoid Neg Hx     Asthma Neg Hx     Bleeding Prob Neg Hx     Diabetes Neg Hx     Elevated Lipids Neg Hx     Headache Neg Hx     Lung Disease Neg Hx     Migraines Neg Hx     Psychiatric Disorder Neg Hx     Stroke Neg Hx     Mental Retardation Neg Hx Social History     Socioeconomic History    Marital status:      Spouse name: Not on file    Number of children: Not on file    Years of education: Not on file    Highest education level: Not on file   Occupational History    Not on file   Social Needs    Financial resource strain: Not on file    Food insecurity:     Worry: Not on file     Inability: Not on file    Transportation needs:     Medical: Not on file     Non-medical: Not on file   Tobacco Use    Smoking status: Never Smoker    Smokeless tobacco: Never Used   Substance and Sexual Activity    Alcohol use: Yes     Alcohol/week: 1.8 - 3.0 oz     Types: 3 - 5 Glasses of wine per week     Comment: occ    Drug use: No    Sexual activity: Yes     Partners: Male     Birth control/protection: Surgical   Lifestyle    Physical activity:     Days per week: Not on file     Minutes per session: Not on file    Stress: Not on file   Relationships    Social connections:     Talks on phone: Not on file     Gets together: Not on file     Attends [employer] service: Not on file     Active member of club or organization: Not on file     Attends meetings of clubs or organizations: Not on file     Relationship status: Not on file    Intimate partner violence:     Fear of current or ex partner: Not on file     Emotionally abused: Not on file     Physically abused: Not on file     Forced sexual activity: Not on file   Other Topics Concern     Service No    Blood Transfusions No    Caffeine Concern No    Occupational Exposure No    Hobby Hazards No    Sleep Concern Yes    Stress Concern Yes    Weight Concern Yes    Special Diet No    Back Care No    Exercise Yes    Bike Helmet No    Seat Belt Yes    Self-Exams Not Asked   Social History Narrative    Not on file         ALLERGIES: Naproxen    Review of Systems   Constitutional: Positive for diaphoresis. Negative for chills and fever. HENT: Negative for sore throat. Respiratory: Negative for cough and shortness of breath. Cardiovascular: Positive for palpitations. Negative for chest pain and leg swelling. Gastrointestinal: Negative for abdominal pain, blood in stool and vomiting. Genitourinary: Negative for dysuria. Musculoskeletal: Negative for back pain. Skin: Negative for rash. Neurological: Positive for light-headedness. Negative for syncope and headaches. Psychiatric/Behavioral: Negative for confusion. All other systems reviewed and are negative. Vitals:    06/22/19 0746   BP: 134/73   Pulse: 64   Resp: 18   Temp: 98.2 °F (36.8 °C)   SpO2: 99%   Weight: 59 kg (130 lb)   Height: 5' 7\" (1.702 m)            Physical Exam   Constitutional: She is oriented to person, place, and time. She appears well-developed. No distress. HENT:   Head: Normocephalic and atraumatic. Eyes: Pupils are equal, round, and reactive to light. Conjunctivae and EOM are normal.   Neck: Normal range of motion. Neck supple. Cardiovascular: Normal rate, regular rhythm and normal heart sounds. No murmur heard. Pulmonary/Chest: Effort normal and breath sounds normal. No respiratory distress. Abdominal: Soft. Bowel sounds are normal. She exhibits no distension. There is no tenderness. There is no rebound. Musculoskeletal: Normal range of motion. She exhibits no edema. Neurological: She is alert and oriented to person, place, and time. No cranial nerve deficit. She exhibits normal muscle tone. Coordination normal.   Skin: Skin is warm and dry. No rash noted. Psychiatric: She has a normal mood and affect. Her behavior is normal.   Nursing note and vitals reviewed. Note written by Bruno Brittle, Scribe, as dictated by Susanna Escamilla MD 7:55 AM      MDM       Procedures    ED EKG interpretation:  Rhythm: normal sinus rhythm; and regular . Rate (approx.): 63 BPM; Axis: normal; Intervals: normal; ST/T wave: No STEMI; No significant changes from yesterday's EKG.   Note written by Kenya Seo, as dictated by Marvie Soulier, MD 7:48 AM    The patient is resting comfortably and feels better, is alert, talkative, interactive and in no distress. The repeat examination is unremarkable and benign. The patient is neurologically intact, has a normal mental status and is ambulatory in the ED. The history, exam, diagnostic testing (if any) and the patient's current condition do not suggest pathologic dysrhythmias, stroke, sepsis, ACS, severe anemia or active GIB, dehydration, or severe electrolyte abnormality or other significant pathology that would warrant further testing, continued ED treatment, admission, neurological consultation, or other specialist evaluation at this point. The vital signs have been stable. The patient's condition is stable and appropriate for discharge. The patient will pursue further outpatient evaluation with the primary care physician or other designated or consulting physician as indicated in the discharge instructions.

## 2019-06-22 NOTE — ED TRIAGE NOTES
Per EMS, pt began having fluttering in her chest with cold sweats and light headedness. Same thing happened this past Tuesday, saw PCP yesterday and was referred to cardiology but cant be seen until last week. Aspirin given PTA.

## 2019-06-22 NOTE — DISCHARGE INSTRUCTIONS
Patient Education        Fatigue: Care Instructions  Your Care Instructions    Fatigue is a feeling of tiredness, exhaustion, or lack of energy. You may feel fatigue because of too much or not enough activity. It can also come from stress, lack of sleep, boredom, and poor diet. Many medical problems, such as viral infections, can cause fatigue. Emotional problems, especially depression, are often the cause of fatigue. Fatigue is most often a symptom of another problem. Treatment for fatigue depends on the cause. For example, if you have fatigue because you have a certain health problem, treating this problem also treats your fatigue. If depression or anxiety is the cause, treatment may help. Follow-up care is a key part of your treatment and safety. Be sure to make and go to all appointments, and call your doctor if you are having problems. It's also a good idea to know your test results and keep a list of the medicines you take. How can you care for yourself at home? · Get regular exercise. But don't overdo it. Go back and forth between rest and exercise. · Get plenty of rest.  · Eat a healthy diet. Do not skip meals, especially breakfast.  · Reduce your use of caffeine, tobacco, and alcohol. Caffeine is most often found in coffee, tea, cola drinks, and chocolate. · Limit medicines that can cause fatigue. This includes tranquilizers and cold and allergy medicines. When should you call for help? Watch closely for changes in your health, and be sure to contact your doctor if:    · You have new symptoms such as fever or a rash.     · Your fatigue gets worse.     · You have been feeling down, depressed, or hopeless. Or you may have lost interest in things that you usually enjoy.     · You are not getting better as expected. Where can you learn more? Go to http://tara-pat.info/. Enter F837 in the search box to learn more about \"Fatigue: Care Instructions. \"  Current as of: September 23, 2018  Content Version: 11.9  © 7167-8033 [employer]. Care instructions adapted under license by [employer] (which disclaims liability or warranty for this information). If you have questions about a medical condition or this instruction, always ask your healthcare professional. [address] any warranty or liability for your use of this information. Patient Education        Palpitations: Care Instructions  Your Care Instructions    Heart palpitations are the uncomfortable sensation that your heart is beating fast or irregularly. You might feel pounding or fluttering in your chest. It might feel like your heart is skipping a beat. Although palpitations may be caused by a heart problem, they also occur because of stress, fatigue, or use of alcohol, caffeine, or nicotine. Many medicines, including diet pills, antihistamines, decongestants, and some herbal products, can cause heart palpitations. Nearly everyone has palpitations from time to time. Depending on your symptoms, your doctor may need to do more tests to try to find the cause of your palpitations. Follow-up care is a key part of your treatment and safety. Be sure to make and go to all appointments, and call your doctor if you are having problems. It's also a good idea to know your test results and keep a list of the medicines you take. How can you care for yourself at home? · Avoid caffeine, nicotine, and excess alcohol. · Do not take illegal drugs, such as methamphetamines and cocaine. · Do not take weight loss or diet medicines unless you talk with your doctor first.  · Get plenty of sleep. · Do not overeat. · If you have palpitations again, take deep breaths and try to relax. This may slow a racing heart. · If you start to feel lightheaded, lie down to avoid injuries that might result if you pass out and fall down.   · Keep a record of your palpitations and bring it to your next doctor's appointment. Write down:  ? The date and time. ? Your pulse. (If your heart is beating fast, it may be hard to count your pulse.)  ? What you were doing when the palpitations started. ? How long the palpitations lasted. ? Any other symptoms. · If an activity causes palpitations, slow down or stop. Talk to your doctor before you do that activity again. · Take your medicines exactly as prescribed. Call your doctor if you think you are having a problem with your medicine. When should you call for help? Call 911 anytime you think you may need emergency care. For example, call if:    · You passed out (lost consciousness).     · You have symptoms of a heart attack. These may include:  ? Chest pain or pressure, or a strange feeling in the chest.  ? Sweating. ? Shortness of breath. ? Pain, pressure, or a strange feeling in the back, neck, jaw, or upper belly or in one or both shoulders or arms. ? Lightheadedness or sudden weakness. ? A fast or irregular heartbeat. After you call 911, the  may tell you to chew 1 adult-strength or 2 to 4 low-dose aspirin. Wait for an ambulance. Do not try to drive yourself.     · You have symptoms of a stroke. These may include:  ? Sudden numbness, tingling, weakness, or loss of movement in your face, arm, or leg, especially on only one side of your body. ? Sudden vision changes. ? Sudden trouble speaking. ? Sudden confusion or trouble understanding simple statements. ? Sudden problems with walking or balance. ? A sudden, severe headache that is different from past headaches.    Call your doctor now or seek immediate medical care if:    · You have heart palpitations and:  ? Are dizzy or lightheaded, or you feel like you may faint. ? Have new or increased shortness of breath.    Watch closely for changes in your health, and be sure to contact your doctor if:    · You continue to have heart palpitations. Where can you learn more?   Go to http://tara-pat.info/. Enter R508 in the search box to learn more about \"Palpitations: Care Instructions. \"  Current as of: July 22, 2018  Content Version: 11.9  © 4465-1029 [employer]. Care instructions adapted under license by [employer] (which disclaims liability or warranty for this information). If you have questions about a medical condition or this instruction, always ask your healthcare professional. [place] [address] any warranty or liability for your use of this information.

## 2019-06-23 LAB
ATRIAL RATE: 63 BPM
CALCULATED P AXIS, ECG09: 70 DEGREES
CALCULATED R AXIS, ECG10: 77 DEGREES
CALCULATED T AXIS, ECG11: 53 DEGREES
DIAGNOSIS, 93000: NORMAL
P-R INTERVAL, ECG05: 154 MS
Q-T INTERVAL, ECG07: 410 MS
QRS DURATION, ECG06: 90 MS
QTC CALCULATION (BEZET), ECG08: 419 MS
VENTRICULAR RATE, ECG03: 63 BPM

## 2019-06-24 ENCOUNTER — TELEPHONE (OUTPATIENT)
Dept: FAMILY MEDICINE CLINIC | Age: 54
End: 2019-06-24

## 2019-06-24 NOTE — TELEPHONE ENCOUNTER
Pt would like to talk about visit on Friday at office and then having to go to the ER on Saturday  Contact pt at [phone]

## 2019-06-24 NOTE — PROGRESS NOTES
Please call patient and let her know that her labs returned. Stable.   Should follow up with cardiology as previously discussed  Thanks

## 2019-06-24 NOTE — TELEPHONE ENCOUNTER
Returned patients call. She wanted to inform me that she was seen in ER over the weekend for the same symptoms that she was seen by me last week, palpitations, and they stated that everything was normal.  Pt has an appointment with cardiology on Thursday. Pt was asking if there are any tests, such as PET scan, that I can order in the meantime. Informed patient that at this time, I believe that cardiology is best next step, and she should call to see if she could get an earlier appointment. Should always call 911 or go to the ER with ANY worsening of symptoms, questions or concerns.

## 2019-06-27 ENCOUNTER — OFFICE VISIT (OUTPATIENT)
Dept: CARDIOLOGY CLINIC | Age: 54
End: 2019-06-27

## 2019-06-27 VITALS
HEART RATE: 69 BPM | OXYGEN SATURATION: 98 % | WEIGHT: 134.2 LBS | BODY MASS INDEX: 21.06 KG/M2 | HEIGHT: 67 IN | RESPIRATION RATE: 16 BRPM

## 2019-06-27 DIAGNOSIS — R42 DIZZINESS: Primary | ICD-10-CM

## 2019-06-27 DIAGNOSIS — I95.1 ORTHOSTASIS: ICD-10-CM

## 2019-06-27 RX ORDER — 5-HYDROXYTRYPTOPHAN (5-HTP) 100 MG
2 CAPSULE ORAL DAILY
COMMUNITY
End: 2019-10-09

## 2019-06-27 RX ORDER — ASPIRIN 325 MG
325 TABLET ORAL
COMMUNITY
End: 2019-10-09

## 2019-06-27 NOTE — PROGRESS NOTES
Volodymyr Brumfield is a 48 y.o. female    Chief Complaint   Patient presents with    New Patient     Ref by Dr. Eliana Griffin Dizziness        Chest pain Pt states she has some chest discomfort when she is more active. SOB Pt denied  Dizziness Pt states feeling dizzy at random times. Pt states the dizziness has gotten worse within the last two weeks. Swelling Pt denied  Recent hospital visit YES  Refills NO    Visit Vitals  Pulse 69   Resp 16   Ht 5' 7\" (1.702 m)   Wt 134 lb 3.2 oz (60.9 kg)   LMP 08/19/2010   SpO2 98%   BMI 21.02 kg/m²       1. Have you been to the ER, urgent care clinic since your last visit? Hospitalized since your last visit? Yes, [facility] 6/22/19 for [facility]. 2. Have you seen or consulted any other health care providers outside of the [address] [place] [address] since your last visit? Include any pap smears or colon screening. Pt saw Dr. Rachel Holcomb 2nd opinion for the dizziness.     Extended / Orthostatic Vitals:  Patient Position 2: Supine  BP 2: 122/72  Pulse 2: 70  Patient Position 3: Standing  BP 3: 114/70  Pulse 3: 71  Patient Position 4: Standing  BP 4: 120/82  Pulse 4: 69

## 2019-06-27 NOTE — PROGRESS NOTES
Amie Arcos MD    [address] [place], [address] [facility]    Office [phone],[facility] [phone]  Pager [phone] Keisha De Oliveira is a 48 y.o. female referred for evaluation of dizziness. Consult requested by Rozina Faust NP    Primary care physician:  Rozina Faust NP      Chief complaint:  Michael Christianson is a 48 y.o. female who complains of   Chief Complaint   Patient presents with    New Patient     Ref by Dr. Wilson Must       Dear Ms Lubna Fierro,    I had the pleasure of seeing Ms Keisha De Oliveira in the office today. Assessment:    Dizziness  Anxiety/Dizziness  ? Orthostatic Hypotension    Plan:     ECHO  Continue florinef   Compression stockings  Keep hydrated  Records from cardiologist  Aggressive CV risk factor modification. Patient understands the plan. All questions were answered to the patient's satisfaction. Medication Side Effects and Warnings were discussed with patient: yes  Patient Labs were reviewed and or requested:  yes  Patient Past Records were reviewed and or requested: yes    I [facility] the [facility]. Please see note below for details. Please do not hesitate to contact us with questions or concerns. Amie Arcos MD    Cardiac Testing/ Procedures: A. Cardiac Cath/PCI:    B.ECHO/NIECY:     C.StressNuclear/Stress ECHO/Stress test: Stress test - nml 3/2019    D. Vascular:    E. EP: Event monitor ( Dr Danie Mackenzie) - 3/2019 - Nml    F. Miscellaneous:    History of present illness:    48 yr old with hx of intermittent dizziness for the past few months. Recently because of the dizziness she had a fall. No loss of consciousness. Has had a cardiac evaluation in March which was negative. Recently was started on Florinef because of orthostatic hypotension by PCP. She has taken 2 doses.     Past Medical History:   Diagnosis Date    Anxiety     Arthritis     Chronic neck pain 6/27/2015    Fatigue     GERD (gastroesophageal reflux disease) 3/12/2014    Headache     Hiatal hernia     PUD (peptic ulcer disease)     TIA (transient ischemic attack) 6/27/2015    Unspecified hypothyroidism 12/19/2012      Past Surgical History:   Procedure Laterality Date    HX GYN      BTL    HX HEENT      HX WISDOM TEETH EXTRACTION       Family History   Problem Relation Age of Onset    Cancer Father     Hypertension Mother     Heart Disease Paternal Grandfather     Alcohol abuse Neg Hx     Arthritis-rheumatoid Neg Hx     Asthma Neg Hx     Bleeding Prob Neg Hx     Diabetes Neg Hx     Elevated Lipids Neg Hx     Headache Neg Hx     Lung Disease Neg Hx     Migraines Neg Hx     Psychiatric Disorder Neg Hx     Stroke Neg Hx     Mental Retardation Neg Hx       Social History     Tobacco Use    Smoking status: Never Smoker    Smokeless tobacco: Never Used   Substance Use Topics    Alcohol use: Yes     Alcohol/week: 1.8 - 3.0 oz     Types: 3 - 5 Glasses of wine per week     Comment: occ    Drug use: No          Medications before admission:    Current Outpatient Medications   Medication Sig Dispense    aspirin (ASPIRIN) 325 mg tablet Take 325 mg by mouth daily.  5-Hydroxytryptophan (5-HTP) 100 mg cap Take 2 Caps by mouth daily.  THEANINE PO Take  by mouth daily.  CALCIUM PO Take  by mouth.  cholecalciferol, vitamin D3, (VITAMIN D3 PO) Take  by mouth.  citalopram (CELEXA) 20 mg tablet Take 1 Tab by mouth daily. (Patient taking differently: Take 10 mg by mouth daily.) 90 Tab    clonazePAM (KLONOPIN) 0.5 mg tablet Take 1 Tab by mouth nightly as needed (anxiety). Max Daily Amount: 0.5 mg. 20 Tab    estradiol (VAGIFEM) 10 mcg tab vaginal tablet Insert 10 mcg into vagina every Monday and Thursday.  GINKGO BILOBA (GINKOBA PO) Take 1 Tab by mouth once over twenty-four (24) hours.  EVENING PRIMROSE OIL PO Take 1 Tab by mouth daily.      Flaxseed Oil oil 1 Tab by Does Not Apply route once over twenty-four (24) hours.  valACYclovir (VALTREX) 500 mg tablet Take 500 mg by mouth as needed.  multivitamin (ONE A DAY) tablet Take 1 Tab by mouth daily.  Dexlansoprazole (DEXILANT) 60 mg CpDB Take 60 mg by mouth as needed.  LYSINE PO Take 1 Tab by mouth once over twenty-four (24) hours.  aspirin 81 mg chewable tablet Take 1 Tab by mouth daily. (Patient not taking: Reported on 6/27/2019) 30 Tab     No current facility-administered medications for this visit. Review of Systems:  (bold if positive, if negative)    Gen:  Eyes:  ENT:  CVS:  Pulm:  GI:   nausea, emesis,diarrhea,   :    MS:  Pain, arthritisSkin:  Psych:   depression, anxietyEndo:    Hem:  Renal:    Neuro:        Physical Exam:  Visit Vitals  Pulse 69   Resp 16   Ht 5' 7\" (1.702 m)   Wt 134 lb 3.2 oz (60.9 kg)   LMP 08/19/2010   SpO2 98%   BMI 21.02 kg/m²        Extended / Orthostatic Vitals:  Patient Position 2: Supine  BP 2: 122/72  Pulse 2: 70  Patient Position 3: Standing  BP 3: 114/70  Pulse 3: 71  Patient Position 4: Standing  BP 4: 120/82  Pulse 4: 69      Gen: Well-developed, well-nourished, in no acute distress  HEENT:  Pink conjunctivae, hearing intact to voice, moist mucous membranes  Neck: Supple,No JVD, No Carotid Bruit, Thyroid- non tender  Resp: No accessory muscle use, Clear breath sounds, No rales or rhonchi  Card: Regular Rate,Rythm,Normal S1, S2, No murmurs, rubs or gallop. No thrills. Abd:  Soft, non-tender, non-distended, normoactive bowel sounds are present,   MSK: No cyanosis or clubbing  Skin: No rashes or ulcers  Neuro:   moving all four extremities, no focal deficit, follows commands appropriately  Psych:  Good insight, oriented to person, place and time, alert, Nml Affect  LE: No edema  Vascular:Radial Pulses 2+ and symmetric        EKG: SR/Pos LAE      Cxray:    LABS:    No results for input(s): CPK, TROIQ in the last 72 hours.     No lab exists for component: CKQMB, CPKMB    Lab Results Component Value Date/Time    WBC 4.2 06/22/2019 07:55 AM    HGB 13.7 06/22/2019 07:55 AM    HCT 40.4 06/22/2019 07:55 AM    PLATELET 048 33/45/2025 07:55 AM    MCV 92.7 06/22/2019 07:55 AM     Lab Results   Component Value Date/Time    Sodium 140 06/22/2019 07:55 AM    Potassium 3.4 (L) 06/22/2019 07:55 AM    Chloride 108 06/22/2019 07:55 AM    CO2 24 06/22/2019 07:55 AM    Anion gap 8 06/22/2019 07:55 AM    Glucose 106 (H) 06/22/2019 07:55 AM    BUN 12 06/22/2019 07:55 AM    Creatinine 0.71 06/22/2019 07:55 AM    BUN/Creatinine ratio 17 06/22/2019 07:55 AM    GFR est AA >60 06/22/2019 07:55 AM    GFR est non-AA >60 06/22/2019 07:55 AM    Calcium 8.9 06/22/2019 07:55 AM             Aki Parker MD

## 2019-06-28 ENCOUNTER — DOCUMENTATION ONLY (OUTPATIENT)
Dept: CARDIOLOGY CLINIC | Age: 54
End: 2019-06-28

## 2019-06-28 ENCOUNTER — TELEPHONE (OUTPATIENT)
Dept: CARDIOLOGY CLINIC | Age: 54
End: 2019-06-28

## 2019-06-28 NOTE — TELEPHONE ENCOUNTER
Called patient no answer, LM/Vm to call office in regards to below message. Pt called Darvin ( on call ) to let him know she cannot tolerate compression stockings. If she cant wear them - she cant .  Cont Florinef for now and maintain BP log - will d/w pt further on f/u

## 2019-06-28 NOTE — PROGRESS NOTES
Reviewed office notes of Dr. Sondra Carter seen by him April 11, 2019    Treadmill stress test 3/21/19-12 minutes. Normal.    E cardio event monitor 3/21/2019 to 4/19/2019-minimum heart rate 42 bpm maximum heart rate 145 bpm.  No A. fib. Sinus rhythm, sinus tachycardia, PAC    Stress echocardiogram 3/26/2012-9 minutes 30 seconds. Normal  Holter monitor 3/26/2012-average heart rate was 66 bpm.  Minimum heart rate 41. Maximum heart rate 145 bpm.  Rare PACs. Rare PVCs.

## 2019-07-02 ENCOUNTER — TELEPHONE (OUTPATIENT)
Dept: CARDIOLOGY CLINIC | Age: 54
End: 2019-07-02

## 2019-07-02 NOTE — TELEPHONE ENCOUNTER
Reports left arm and left foot swelling and tingling. States sensation is intermittent is not related to the time of day. States sensation was more intense earlier today. Has been having sensation for a few months. Please advise.

## 2019-07-08 NOTE — TELEPHONE ENCOUNTER
MD Royer Younger LPN; Deepika Fernández LPN 3 days ago     She may need to see PCP about this chronic problem.     Routing comment

## 2019-07-26 ENCOUNTER — TELEPHONE (OUTPATIENT)
Dept: CARDIOLOGY CLINIC | Age: 54
End: 2019-07-26

## 2019-07-26 ENCOUNTER — OFFICE VISIT (OUTPATIENT)
Dept: CARDIOLOGY CLINIC | Age: 54
End: 2019-07-26

## 2019-07-26 VITALS
OXYGEN SATURATION: 97 % | HEIGHT: 67 IN | WEIGHT: 133.6 LBS | DIASTOLIC BLOOD PRESSURE: 78 MMHG | SYSTOLIC BLOOD PRESSURE: 140 MMHG | HEART RATE: 56 BPM | BODY MASS INDEX: 20.97 KG/M2 | RESPIRATION RATE: 16 BRPM

## 2019-07-26 DIAGNOSIS — R00.2 PALPITATIONS: Primary | ICD-10-CM

## 2019-07-26 DIAGNOSIS — I95.1 ORTHOSTATIC HYPOTENSION: ICD-10-CM

## 2019-07-26 NOTE — TELEPHONE ENCOUNTER
Pt called stating her heart was racing and tingling in her left arm around midnight. She stated she had a headache and discomfort in her chest but no symptoms at the moment.   Phone #[phone]  Thanks

## 2019-07-26 NOTE — PROGRESS NOTES
Eric Martell MD    [address] [place], [address] [facility]    Office [phone],[facility] [phone]  Pager  Юлия Baumann is a 48 y.o. female is here for f/u visit for    Primary care physician:  Terrie Ocampo NP    Patient Active Problem List   Diagnosis Code    Panic disorder without agoraphobia F41.0    Unspecified hypothyroidism E03.9    GERD (gastroesophageal reflux disease) K21.9    Injury of elbow, left S59.902A    Swelling of joint, elbow, left M25.422    Anxiety F41.9    TIA (transient ischemic attack) G45.9    Paresthesia of left arm R20.2    Chronic neck pain M54.2, G89.29    Burning sensation of feet R20.8    Screening for thyroid disorder Z13.29       Dear Ms Miladys Maharaj had the pleasure of seeing Ms Clarissa Salvador in the office today.        Assessment:     Palpitatons  Dizziness  Anxiety/Dizziness  ? Orthostatic Hypotension     Plan:         Continue florinef   Compression stockings- had pain while wearing it - does not use it anymore  Keep hydrated   calcium score. Aggressive CV risk factor modification. Has appointment in a month. Patient understands the plan. All questions were answered to the patient's satisfaction. Medication Side Effects and Warnings were discussed with patient: yes  Patient Labs were reviewed and or requested:  yes  Patient Past Records were reviewed and or requested: yes    I appreciate the opportunity to be involved in [facility]. See note below for details. Please do not hesitate to contact us with questions or concerns. Eric Martell MD    Cardiac Testing/ Procedures: A. Cardiac Cath/PCI:     B. ECHO/NIECY:      C. StressNuclear/Stress ECHO/Stress test: Stress test - nml 3/2019     D. Vascular:     E. EP: Event monitor ( Dr Epifanio Barkley) - 3/2019 - Nml     F. Miscellaneous:  Office notes of Dr. Jovan Ventura seen by him April 11, 2019    Treadmill stress test 3/21/19-12 minutes.   Normal.    E cardio event monitor 3/21/2019 to 4/19/2019-minimum heart rate 42 bpm maximum heart rate 145 bpm.  No A. fib. Sinus rhythm, sinus tachycardia, PAC    Stress echocardiogram 3/26/2012-9 minutes 30 seconds. Normal  Holter monitor 3/26/2012-average heart rate was 66 bpm.  Minimum heart rate 41. Maximum heart rate 145 bpm.  Rare PACs. Rare PVCs. Subjective:  Arely Becker is a 48 y.o. female who returns for follow up . Last night patient was trying to sleep and could not go to sleep. Later on her heart rate increased to greater than 100 and she felt palpitations. No dizziness, syncope. Had some left upper extremity numbness. Denies panic attacks. On Florinef. Since she has been on the Florinef she has been doing well. ( Initial visit - 6/27/19 48 yr old with hx of intermittent dizziness for the past few months. Recently because of the dizziness she had a fall. No loss of consciousness. Has had a cardiac evaluation in March which was negative. Recently was started on Florinef because of orthostatic hypotension by PCP. She has taken 2 doses.)    ROS:  (bold if positive, if negative)             Medications before admission:    Current Outpatient Medications   Medication Sig Dispense    aspirin (ASPIRIN) 325 mg tablet Take 325 mg by mouth daily as needed.  5-Hydroxytryptophan (5-HTP) 100 mg cap Take 2 Caps by mouth daily.  CALCIUM PO Take  by mouth.  cholecalciferol, vitamin D3, (VITAMIN D3 PO) Take  by mouth.  citalopram (CELEXA) 20 mg tablet Take 1 Tab by mouth daily. (Patient taking differently: Take 10 mg by mouth daily.) 90 Tab    clonazePAM (KLONOPIN) 0.5 mg tablet Take 1 Tab by mouth nightly as needed (anxiety). Max Daily Amount: 0.5 mg. 20 Tab    estradiol (VAGIFEM) 10 mcg tab vaginal tablet Insert 10 mcg into vagina every Monday and Thursday.  valACYclovir (VALTREX) 500 mg tablet Take 500 mg by mouth as needed.  multivitamin (ONE A DAY) tablet Take 1 Tab by mouth daily.  THEANINE PO Take  by mouth daily.  GINKGO BILOBA (GINKOBA PO) Take 1 Tab by mouth once over twenty-four (24) hours.  EVENING PRIMROSE OIL PO Take 1 Tab by mouth daily.  Dexlansoprazole (DEXILANT) 60 mg CpDB Take 60 mg by mouth as needed.  LYSINE PO Take 1 Tab by mouth once over twenty-four (24) hours.  Flaxseed Oil oil 1 Tab by Does Not Apply route once over twenty-four (24) hours.  aspirin 81 mg chewable tablet Take 1 Tab by mouth daily. (Patient not taking: Reported on 7/26/2019) 30 Tab     No current facility-administered medications for this visit. Family History of CAD:    No    Social History:  Current  Smoker No    Physical Exam:  Visit Vitals  /78 (BP 1 Location: Left arm, BP Patient Position: Sitting)   Pulse (!) 56   Resp 16   Ht 5' 7\" (1.702 m)   Wt 133 lb 9.6 oz (60.6 kg)   LMP 08/19/2010   SpO2 97%   BMI 20.92 kg/m²          Gen: Well-developed, well-nourished, in no acute distress  Neck: Supple,No JVD, No Carotid Bruit,   Resp: No accessory muscle use, Clear breath sounds, No rales or rhonchi  Card: Regular Rate,Rythm,Normal S1, S2, No murmurs, rubs or gallop. No thrills.    Abd:  Soft, non-tender, non-distended,BS+,   MSK: No cyanosis  Skin: No rashes    Neuro: moving all four extremities , follows commands appropriately  Psych:  Good insight, oriented to person, place , alert, Nml Affect  LE: No edema    EKG: SBrady/NSST      LABS:        Lab Results   Component Value Date/Time    WBC 4.2 06/22/2019 07:55 AM    HGB 13.7 06/22/2019 07:55 AM    HCT 40.4 06/22/2019 07:55 AM    PLATELET 834 76/58/2777 07:55 AM     Lab Results   Component Value Date/Time    Sodium 140 06/22/2019 07:55 AM    Potassium 3.4 (L) 06/22/2019 07:55 AM    Chloride 108 06/22/2019 07:55 AM    CO2 24 06/22/2019 07:55 AM    Anion gap 8 06/22/2019 07:55 AM    Glucose 106 (H) 06/22/2019 07:55 AM    BUN 12 06/22/2019 07:55 AM    Creatinine 0.71 06/22/2019 07:55 AM    BUN/Creatinine ratio 17 06/22/2019 07:55 AM    GFR est AA >60 06/22/2019 07:55 AM    GFR est non-AA >60 06/22/2019 07:55 AM    Calcium 8.9 06/22/2019 07:55 AM       Lab Results   Component Value Date/Time    aPTT 25.2 06/27/2015 12:13 AM     Lab Results   Component Value Date/Time    INR 1.0 06/27/2015 12:13 AM    Prothrombin time 9.5 06/27/2015 12:13 AM     No components found for: Chris Barragan MD

## 2019-07-26 NOTE — PROGRESS NOTES
Fernando Price is a 48 y.o. female    Chief Complaint   Patient presents with    Follow-up    Rapid Heart Rate    Chest Pain       Chest pain Pt states having left sided chest pain. SOB NO  Dizziness NO  Swelling NO  Recent hospital visit NO  Refills NO    Visit Vitals  /78 (BP 1 Location: Left arm, BP Patient Position: Sitting)   Pulse (!) 56   Resp 16   Ht 5' 7\" (1.702 m)   Wt 133 lb 9.6 oz (60.6 kg)   LMP 08/19/2010   SpO2 97%   BMI 20.92 kg/m²       1. Have you been to the ER, urgent care clinic since your last visit? Hospitalized since your last visit? No    2. Have you seen or consulted any other health care providers outside of the [address] [place] [address] since your last visit? Include any pap smears or colon screening.  No

## 2019-09-05 ENCOUNTER — OFFICE VISIT (OUTPATIENT)
Dept: FAMILY MEDICINE CLINIC | Age: 54
End: 2019-09-05

## 2019-09-05 VITALS
HEIGHT: 67 IN | WEIGHT: 130.2 LBS | OXYGEN SATURATION: 98 % | HEART RATE: 64 BPM | SYSTOLIC BLOOD PRESSURE: 138 MMHG | RESPIRATION RATE: 20 BRPM | TEMPERATURE: 98.1 F | BODY MASS INDEX: 20.44 KG/M2 | DIASTOLIC BLOOD PRESSURE: 86 MMHG

## 2019-09-05 DIAGNOSIS — R00.2 PALPITATIONS: ICD-10-CM

## 2019-09-05 DIAGNOSIS — R42 LIGHT HEADEDNESS: ICD-10-CM

## 2019-09-05 DIAGNOSIS — I15.9 SECONDARY HYPERTENSION: Primary | ICD-10-CM

## 2019-09-05 RX ORDER — FLUDROCORTISONE ACETATE 0.1 MG/1
0.1 TABLET ORAL DAILY
COMMUNITY
End: 2019-10-09

## 2019-09-05 NOTE — LETTER
NOTIFICATION RETURN TO WORK / SCHOOL 
 
9/5/2019 5:15 PM 
 
Ms. Sheila Thomas [address] [place] [phone] To Whom It May Concern: 
 
Sheila Thomas is currently under the care of [address] [place] [address]. She is in the process of being assessed for a medical illness that prevents her from driving at this time. She will need to be out of work for the next week as we clarify the etiology of her medical concerns. She will return to work/school on: 9/16/2019. If there are questions or concerns please have the patient contact our office. Sincerely, Keith Price MD

## 2019-09-05 NOTE — PROGRESS NOTES
Patient complained that in June she experienced ears feeling full, then feeling lightheaded, went to the bathroom to have a bowel movement; walked approximately 20 feet and went down on the floor. Did not lose conciousness, however could not move. Was seen by a  Cardiologist and started on fludrocort due to having a very low blood pressure at time of visit. Today she was vacuuming and felt the same initial symptoms, ears, lightheaded, so she sat down and took one fludrocort tablet. Patient also has severe headaches along with these episodes. \"My head just feels like it's going to explode.

## 2019-09-09 NOTE — PROGRESS NOTES
Chief Complaint:  Chief Complaint   Patient presents with    Fall     Complains of legs just giving out after having some very strange symptoms       History of Present Illness:    53F who presents with onset of  ears feeling full, then feeling lightheaded, went to the bathroom to have a bowel movement; walked approximately 20 feet and went down on the floor. Did not lose conciousness, however could not move. Was seen by a  Cardiologist and started on fludrocort due to having a very low blood pressure at time of visit. Today, her BP is elevated. She has been taking the Florinef. Today she was vacuuming and felt the same initial symptoms, ears, lightheaded, so she sat down and took one florinef tablet. Patient also has severe headaches along with these episodes. \"My head just feels like it's going to explode. The patient has had work up with cardiology. Multiple blood tests without a clear etiology. She has dramatic swings in her BP. In addition, she c/o Palpitations, Dizziness and there was a question of possible orthostatic hypotension, but no orthostasis was noted on exam today. Her BP's increased instead of decreased with change in position and HR increased very minimally. 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 She actually drove to the office today and her  will pick her up. She is not hemodynamically unstable today. Reviewed PmHx, RxHx, FmHx, SocHx, AllgHx and updated and dated in the chart.     Patient Active Problem List    Diagnosis    Burning sensation of feet    Screening for thyroid disorder    TIA (transient ischemic attack)    Paresthesia of left arm    Chronic neck pain    Anxiety    Injury of elbow, left    Swelling of joint, elbow, left    GERD (gastroesophageal reflux disease)    Unspecified hypothyroidism    Panic disorder without agoraphobia     Current Outpatient Medications   Medication Sig Dispense Refill    fludrocortisone (FLORINEF) 0.1 mg tablet Take 0.1 mg by mouth daily.  CALCIUM PO Take  by mouth.  cholecalciferol, vitamin D3, (VITAMIN D3 PO) Take  by mouth.  citalopram (CELEXA) 20 mg tablet Take 1 Tab by mouth daily. (Patient taking differently: Take 10 mg by mouth daily.) 90 Tab 1    clonazePAM (KLONOPIN) 0.5 mg tablet Take 1 Tab by mouth nightly as needed (anxiety). Max Daily Amount: 0.5 mg. 20 Tab 0    estradiol (VAGIFEM) 10 mcg tab vaginal tablet Insert 10 mcg into vagina every Monday and Thursday.  valACYclovir (VALTREX) 500 mg tablet Take 500 mg by mouth as needed.  aspirin (ASPIRIN) 325 mg tablet Take 325 mg by mouth daily as needed.  5-Hydroxytryptophan (5-HTP) 100 mg cap Take 2 Caps by mouth daily.  THEANINE PO Take  by mouth daily.  GINKGO BILOBA (GINKOBA PO) Take 1 Tab by mouth once over twenty-four (24) hours.  EVENING PRIMROSE OIL PO Take 1 Tab by mouth daily.  Dexlansoprazole (DEXILANT) 60 mg CpDB Take 60 mg by mouth as needed.  LYSINE PO Take 1 Tab by mouth once over twenty-four (24) hours.  Flaxseed Oil oil 1 Tab by Does Not Apply route once over twenty-four (24) hours.  aspirin 81 mg chewable tablet Take 1 Tab by mouth daily. (Patient not taking: Reported on 7/26/2019) 30 Tab 0    multivitamin (ONE A DAY) tablet Take 1 Tab by mouth daily.          Review of Systems - negative except as listed above in the HPI    Objective:     Vitals:    09/05/19 1640 09/05/19 1654   BP: 138/86 (!) (P) 152/94   Pulse: 64    Resp: 20    Temp: 98.1 °F (36.7 °C)    TempSrc: Oral    SpO2: 98%    Weight: 130 lb 3.2 oz (59.1 kg)    Height: 5' 7\" (1.702 m)      Physical Examination:   General appearance - alert, well appearing, and in no distress  Mental status - alert, oriented to person, place, and time  Chest - clear to auscultation, no wheezes, rales or rhonchi, symmetric air entry  Heart - normal rate, regular rhythm, normal S1, S2, no murmurs, rubs, clicks or gallops  Neurological - alert, oriented, normal speech, no focal findings or movement disorder noted  Musculoskeletal - no joint tenderness, deformity or swelling  Extremities - peripheral pulses normal, no pedal edema, no clubbing or cyanosis  Skin - normal coloration and turgor, no rashes, no suspicious skin lesions noted    Assessment/ Plan:   53F with concerning symptoms for possible metabolic process inducing hypertension and other symptoms. I am worried about the possibility that she may have an adrenal problem. Will send for CT scan and also plasma metanephrines and urine cortisol. She may subsequently also need to evaluate the pituitary axis as well. I note that her BP's were elevated in the office and this does not go along with a hypotensive crisis. We will advise as the labs come in and after her CT-scan. Note given to hold off on return to work until we understand what is happening. She has already seen cardiology and told to follow up in 1-month. Diagnoses and all orders for this visit:    1. Secondary hypertension  -     CT ABD PELV W WO CONT; Future  -     METANEPHRINES, PLASMA  -     CORTISOL, URINE FREE 24 HR    2. Light headedness  -     CT ABD PELV W WO CONT; Future  -     METANEPHRINES, PLASMA  -     CORTISOL, URINE FREE 24 HR    3. Palpitations  -     CT ABD PELV W WO CONT; Future  -     METANEPHRINES, PLASMA  -     CORTISOL, URINE FREE 24 HR    I have discussed the diagnosis with the patient and the intended treatment plan as seen in the above orders. The patient has received an after-visit summary and questions were answered concerning future plans. Asked to return should symptoms worsen or not improve with treatment. Any pending labs and studies will be relayed to patient when they become available. Pt verbalizes understanding of plan of care and denies further questions or concerns at this time.      Follow-up and Dispositions    · Return in about 4 weeks (around 10/3/2019), or if symptoms worsen or fail to improve.        Reno Max MD

## 2019-09-11 LAB
METANEPH FREE SERPL-MCNC: 15 PG/ML (ref 0–62)
NORMETANEPHRINE SERPL-MCNC: 42 PG/ML (ref 0–145)

## 2019-09-12 LAB
CORTIS F 24H UR-MRATE: 18 UG/24 HR (ref 6–42)
CORTIS F UR-MCNC: 6 UG/L

## 2019-09-13 ENCOUNTER — HOSPITAL ENCOUNTER (OUTPATIENT)
Dept: CT IMAGING | Age: 54
Discharge: HOME | End: 2019-09-13
Attending: INTERNAL MEDICINE
Payer: COMMERCIAL

## 2019-09-13 DIAGNOSIS — I15.9 SECONDARY HYPERTENSION: ICD-10-CM

## 2019-09-13 DIAGNOSIS — R42 LIGHT HEADEDNESS: ICD-10-CM

## 2019-09-13 DIAGNOSIS — R00.2 PALPITATIONS: ICD-10-CM

## 2019-09-13 PROCEDURE — 74178 CT ABD&PLV WO CNTR FLWD CNTR: CPT

## 2019-09-13 PROCEDURE — 74011636320 HC RX REV CODE- 636/320: Performed by: RADIOLOGY

## 2019-09-13 RX ADMIN — IOPAMIDOL 100 ML: 755 INJECTION, SOLUTION INTRAVENOUS at 09:29

## 2019-09-16 DIAGNOSIS — R00.2 PALPITATIONS: ICD-10-CM

## 2019-09-16 DIAGNOSIS — N94.89 PELVIC CONGESTION: ICD-10-CM

## 2019-09-16 DIAGNOSIS — R42 LIGHT HEADEDNESS: Primary | ICD-10-CM

## 2019-09-26 ENCOUNTER — OFFICE VISIT (OUTPATIENT)
Dept: GYNECOLOGY | Age: 54
End: 2019-09-26

## 2019-09-26 VITALS
HEART RATE: 59 BPM | BODY MASS INDEX: 20.94 KG/M2 | SYSTOLIC BLOOD PRESSURE: 128 MMHG | DIASTOLIC BLOOD PRESSURE: 87 MMHG | HEIGHT: 67 IN | WEIGHT: 133.4 LBS

## 2019-09-26 DIAGNOSIS — N94.89 PELVIC CONGESTION SYNDROME: Primary | ICD-10-CM

## 2019-09-26 NOTE — LETTER
2019 9:30 AM 
 
Patient:  Charanjit Barrios YOB: 1965 Date of Visit: 2019 Dear Vicky Brock, TERRELL 
[address] [place] [id number] VIA In Basket MD Angella Lauren [address] [facility] [phone] VIA In Basket 
 : Thank you for referring Ms. José Luis Lowe to me for evaluation/treatment. Below are the relevant portions of my assessment and plan of care. New patient referred by Dr. Hannah Nieves for pelvic congestion. Patient c/o intermittent LLQ pain. [address], [address] [facility], [address] 
P [phone]  F [phone] Office Note Patient ID: 
Name:  Charanjit Barrios MRN:  [id number] :  1965/53 y.o. Date:  2019 HISTORY OF PRESENT ILLNESS: 
Charanjit Barrios is a 48 y.o.  postmenopausal female who is being seen for pelvic congestion. She is referred by Dr. Aris Frausto. She has been having numerous episodes of dizziness and lightheadedness, but without loss of consciousness. Her workup has been essentially negative, except for on CT scan she was noted to have significantly dilated pelvic veins, suggesting pelvic congestion syndrome. There were prominent left gonadal and parametrial vascular channels. The later communicates with a varix that extends toward the midline anteriorly posterior to the pubic symphysis. I have been asked to see her in consultation to see if this might be related to her episodes. She denies any vaginal bleeding. In addition to the dizziness and lightheadedness, she also reports some numbness in her left leg. ROS: 
 and GI review:  Negative Cardiopulmonary review:  Negative Musculoskeletal:  Negative A comprehensive review of systems was negative except for that written in the History of Present Illness. , 10 point ROS 
 
 
OB/GYN ROS: 
 Hx of tubal ligation Patient denies any abnormal bleeding or vaginal discharge. Problem List: 
Patient Active Problem List  
 Diagnosis Date Noted  Pelvic congestion syndrome 09/26/2019  Burning sensation of feet 10/31/2016  TIA (transient ischemic attack) 06/27/2015  Paresthesia of left arm 06/27/2015  Chronic neck pain 06/27/2015  Anxiety 05/21/2015  Injury of elbow, left 01/02/2015  Swelling of joint, elbow, left 01/02/2015  GERD (gastroesophageal reflux disease) 03/12/2014  Unspecified hypothyroidism 12/19/2012  Panic disorder without agoraphobia 05/09/2012 PMH: 
Past Medical History:  
Diagnosis Date  Anxiety  Arthritis  Chronic neck pain 6/27/2015  Fatigue  GERD (gastroesophageal reflux disease) 3/12/2014  Headache   
 Hiatal hernia  PUD (peptic ulcer disease)  TIA (transient ischemic attack) 6/27/2015  Unspecified hypothyroidism 12/19/2012 PSH: 
Past Surgical History:  
Procedure Laterality Date  HX GYN    
 BTL  
 HX HEENT    
 HX WISDOM TEETH EXTRACTION Social History: 
Social History Tobacco Use  Smoking status: Never Smoker  Smokeless tobacco: Never Used Substance Use Topics  Alcohol use: Yes Alcohol/week: 3.0 - 5.0 standard drinks Types: 3 - 5 Glasses of wine per week Comment: occ Family History: 
Family History Problem Relation Age of Onset  Cancer Father  Hypertension Mother  Heart Disease Paternal Grandfather  Alcohol abuse Neg Hx  Arthritis-rheumatoid Neg Hx  Asthma Neg Hx  Bleeding Prob Neg Hx  Diabetes Neg Hx  Elevated Lipids Neg Hx   
 Headache Neg Hx  Lung Disease Neg Hx  Migraines Neg Hx  Psychiatric Disorder Neg Hx  Stroke Neg Hx  Mental Retardation Neg Hx Medications: (reviewed) Current Outpatient Medications Medication Sig  
 aspirin (ASPIRIN) 325 mg tablet Take 325 mg by mouth daily as needed.  5-Hydroxytryptophan (5-HTP) 100 mg cap Take 2 Caps by mouth daily.  CALCIUM PO Take  by mouth.  cholecalciferol, vitamin D3, (VITAMIN D3 PO) Take  by mouth.  citalopram (CELEXA) 20 mg tablet Take 1 Tab by mouth daily. (Patient taking differently: Take 10 mg by mouth daily.)  clonazePAM (KLONOPIN) 0.5 mg tablet Take 1 Tab by mouth nightly as needed (anxiety). Max Daily Amount: 0.5 mg.  
 valACYclovir (VALTREX) 500 mg tablet Take 500 mg by mouth as needed.  multivitamin (ONE A DAY) tablet Take 1 Tab by mouth daily.  fludrocortisone (FLORINEF) 0.1 mg tablet Take 0.1 mg by mouth daily.  THEANINE PO Take  by mouth daily.  estradiol (VAGIFEM) 10 mcg tab vaginal tablet Insert 10 mcg into vagina every Monday and Thursday.  Dexlansoprazole (DEXILANT) 60 mg CpDB Take 60 mg by mouth as needed.  aspirin 81 mg chewable tablet Take 1 Tab by mouth daily. (Patient not taking: Reported on 7/26/2019) No current facility-administered medications for this visit. Allergies: (reviewed) Allergies Allergen Reactions  Naproxen Other (comments) Abdominal Pain OBJECTIVE: 
 
Physical Exam: VITAL SIGNS: Vitals:  
 09/26/19 0827 BP: 128/87 Pulse: (!) 59 Weight: 133 lb 6.4 oz (60.5 kg) Height: 5' 7.01\" (1.702 m) Body mass index is 20.89 kg/m². GENERAL ARON: Conversant, alert, oriented. No acute distress. HEENT: HEENT. No thyroid enlargement. No JVD. Neck: Supple without restrictions. RESPIRATORY: Clear to auscultation and percussion to the bases. No CVAT. CARDIOVASC: RRR without murmur/rub. GASTROINT: soft, non-tender, without masses or organomegaly MUSCULOSKEL: no joint tenderness, deformity or swelling EXTREMITIES: extremities normal, atraumatic, no cyanosis or edema PELVIC: Vulva and vagina appear normal. Bimanual exam reveals normal uterus and adnexa. RECTAL: Deferred NADYA SURVEY: No suspicious lymphadenopathy or edema noted. NEURO: Grossly intact. No acute deficit. Lab Data: 
 
Lab Results Component Value Date/Time WBC 4.2 06/22/2019 07:55 AM  
 HGB 13.7 06/22/2019 07:55 AM  
 HCT 40.4 06/22/2019 07:55 AM  
 PLATELET 457 08/14/8931 07:55 AM  
 MCV 92.7 06/22/2019 07:55 AM  
 
Lab Results Component Value Date/Time Sodium 140 06/22/2019 07:55 AM  
 Potassium 3.4 (L) 06/22/2019 07:55 AM  
 Chloride 108 06/22/2019 07:55 AM  
 CO2 24 06/22/2019 07:55 AM  
 Anion gap 8 06/22/2019 07:55 AM  
 Glucose 106 (H) 06/22/2019 07:55 AM  
 BUN 12 06/22/2019 07:55 AM  
 Creatinine 0.71 06/22/2019 07:55 AM  
 BUN/Creatinine ratio 17 06/22/2019 07:55 AM  
 GFR est AA >60 06/22/2019 07:55 AM  
 GFR est non-AA >60 06/22/2019 07:55 AM  
 Calcium 8.9 06/22/2019 07:55 AM  
 
 
 
CT of abdomen/pelvis (9/13/19) LUNG BASES: Clear. INCIDENTALLY IMAGED HEART AND MEDIASTINUM: The visualized heart is normal in 
size without pericardial effusion. 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 Otherwise unremarkable with normal enhancement of vascular structures and no 
biliary ductal dilation. GALLBLADDER: Unremarkable. SPLEEN: Unremarkable. PANCREAS: No mass or duct dilation. ADRENALS: Unremarkable with no evident mass. KIDNEYS: No mass, calculus, or hydronephrosis. STOMACH: Unremarkable. SMALL BOWEL: No dilatation or wall thickening. COLON: No dilation or wall thickening. APPENDIX: Unremarkable. PERITONEUM: No ascites or pneumoperitoneum. RETROPERITONEUM: No lymphadenopathy or aortic aneurysm. REPRODUCTIVE ORGANS: Uterus and ovaries appear unremarkable. There is a 
prominent left gonadal vein with prominent left parametrial vascular channels 
communicating with a varix that extends towards the midline anterior pelvis 
posterior to the pubic symphysis. URINARY BLADDER: No mass or calculus. BONES: No acute fracture or aggressive lesion.  
ADDITIONAL COMMENTS: N/A 
  
 IMPRESSION: No adrenal mass or CT evidence of extra-adrenal pheochromocytoma. Incidental note of prominent left gonadal vein and left adnexal prominent 
vascular channels, correlate for symptoms of pelvic congestion syndrome and 
consider interventional radiology referral for diagnosis and potential therapy 
as clinically appropriate. IMPRESSION/PLAN: 
Leroy Cortes is a 48 y.o. female with a working diagnosis of pelvic congestion. I reviewed with Leroy Cortes her medical records, physical exam, and review of symptoms. I went over her CT images with her and explained the findings. I cannot say with certainty that the venous congestion in the pelvis is the cause of all of her symptoms, but physiologically and anatomically I can see how it could be related. I discussed with her a laparoscopic hysterectomy with bilateral salpingooophorectomy, as well as ligation of the dilated veins and venous complexes in the pelvis. She was counseled on the risks, benefits, indications, and alternatives of the procedure. Her questions were answered and she wishes to proceed. Signed By: Juan F Monreal MD   
 9/26/2019/8:27 AM  
 
 
 
If you have questions, please do not hesitate to call me. I look forward to following Ms. Hilary Johns along with you.  
 
 
 
Sincerely, 
 
 
Juan F Monreal MD

## 2019-09-26 NOTE — PROGRESS NOTES
[address] [place] [address] [address], [address]  P [phone]  F [phone]    Office Note  Patient ID:  Name:  Leroy Cortes  MRN:  [id number]  :  1965/53 y.o. Date:  2019      HISTORY OF PRESENT ILLNESS:  Leroy Cortes is a 48 y.o.  postmenopausal female who is being seen for pelvic congestion. She is referred by Dr. Prabha Tenorio. She has been having numerous episodes of dizziness and lightheadedness, but without loss of consciousness. Her workup has been essentially negative, except for on CT scan she was noted to have significantly dilated pelvic veins, suggesting pelvic congestion syndrome. There were prominent left gonadal and parametrial vascular channels. The later communicates with a varix that extends toward the midline anteriorly posterior to the pubic symphysis. I have been asked to see her in consultation to see if this might be related to her episodes. She denies any vaginal bleeding. In addition to the dizziness and lightheadedness, she also reports some numbness in her left leg. ROS:   and GI review:  Negative  Cardiopulmonary review:  Negative   Musculoskeletal:  Negative    A comprehensive review of systems was negative except for that written in the History of Present Illness. , 10 point ROS      OB/GYN ROS:    Hx of tubal ligation  Patient denies any abnormal bleeding or vaginal discharge.        Problem List:  Patient Active Problem List    Diagnosis Date Noted    Pelvic congestion syndrome 2019    Burning sensation of feet 10/31/2016    TIA (transient ischemic attack) 2015    Paresthesia of left arm 2015    Chronic neck pain 2015    Anxiety 2015    Injury of elbow, left 2015    Swelling of joint, elbow, left 2015    GERD (gastroesophageal reflux disease) 2014    Unspecified hypothyroidism 2012    Panic disorder without agoraphobia 2012 PMH:  Past Medical History:   Diagnosis Date    Anxiety     Arthritis     Chronic neck pain 6/27/2015    Fatigue     GERD (gastroesophageal reflux disease) 3/12/2014    Headache     Hiatal hernia     PUD (peptic ulcer disease)     TIA (transient ischemic attack) 6/27/2015    Unspecified hypothyroidism 12/19/2012      PSH:  Past Surgical History:   Procedure Laterality Date    HX GYN      BTL    HX HEENT      HX WISDOM TEETH EXTRACTION        Social History:  Social History     Tobacco Use    Smoking status: Never Smoker    Smokeless tobacco: Never Used   Substance Use Topics    Alcohol use: Yes     Alcohol/week: 3.0 - 5.0 standard drinks     Types: 3 - 5 Glasses of wine per week     Comment: occ      Family History:  Family History   Problem Relation Age of Onset   [facility] Cancer Father     Hypertension Mother     Heart Disease Paternal Grandfather     Alcohol abuse Neg Hx     Arthritis-rheumatoid Neg Hx     Asthma Neg Hx     Bleeding Prob Neg Hx     Diabetes Neg Hx     Elevated Lipids Neg Hx     Headache Neg Hx     Lung Disease Neg Hx     Migraines Neg Hx     Psychiatric Disorder Neg Hx     Stroke Neg Hx     Mental Retardation Neg Hx       Medications: (reviewed)  Current Outpatient Medications   Medication Sig    aspirin (ASPIRIN) 325 mg tablet Take 325 mg by mouth daily as needed.  5-Hydroxytryptophan (5-HTP) 100 mg cap Take 2 Caps by mouth daily.  CALCIUM PO Take  by mouth.  cholecalciferol, vitamin D3, (VITAMIN D3 PO) Take  by mouth.  citalopram (CELEXA) 20 mg tablet Take 1 Tab by mouth daily. (Patient taking differently: Take 10 mg by mouth daily.)    clonazePAM (KLONOPIN) 0.5 mg tablet Take 1 Tab by mouth nightly as needed (anxiety). Max Daily Amount: 0.5 mg.    valACYclovir (VALTREX) 500 mg tablet Take 500 mg by mouth as needed.  multivitamin (ONE A DAY) tablet Take 1 Tab by mouth daily.  fludrocortisone (FLORINEF) 0.1 mg tablet Take 0.1 mg by mouth daily.     THEANINE PO Take  by mouth daily.  estradiol (VAGIFEM) 10 mcg tab vaginal tablet Insert 10 mcg into vagina every Monday and Thursday.  Dexlansoprazole (DEXILANT) 60 mg CpDB Take 60 mg by mouth as needed.  aspirin 81 mg chewable tablet Take 1 Tab by mouth daily. (Patient not taking: Reported on 7/26/2019)     No current facility-administered medications for this visit. Allergies: (reviewed)  Allergies   Allergen Reactions    Naproxen Other (comments)     Abdominal Pain          OBJECTIVE:    Physical Exam:  VITAL SIGNS: Vitals:    09/26/19 0827   BP: 128/87   Pulse: (!) 59   Weight: 133 lb 6.4 oz (60.5 kg)   Height: 5' 7.01\" (1.702 m)     Body mass index is 20.89 kg/m². GENERAL ARON: Conversant, alert, oriented. No acute distress. HEENT: HEENT. No thyroid enlargement. No JVD. Neck: Supple without restrictions. RESPIRATORY: Clear to auscultation and percussion to the bases. No CVAT. CARDIOVASC: RRR without murmur/rub. GASTROINT: soft, non-tender, without masses or organomegaly   MUSCULOSKEL: no joint tenderness, deformity or swelling   EXTREMITIES: extremities normal, atraumatic, no cyanosis or edema   PELVIC: Vulva and vagina appear normal. Bimanual exam reveals normal uterus and adnexa. RECTAL: Deferred   NADYA SURVEY: No suspicious lymphadenopathy or edema noted. NEURO: Grossly intact. No acute deficit.        Lab Data:    Lab Results   Component Value Date/Time    WBC 4.2 06/22/2019 07:55 AM    HGB 13.7 06/22/2019 07:55 AM    HCT 40.4 06/22/2019 07:55 AM    PLATELET 140 11/33/7924 07:55 AM    MCV 92.7 06/22/2019 07:55 AM     Lab Results   Component Value Date/Time    Sodium 140 06/22/2019 07:55 AM    Potassium 3.4 (L) 06/22/2019 07:55 AM    Chloride 108 06/22/2019 07:55 AM    CO2 24 06/22/2019 07:55 AM    Anion gap 8 06/22/2019 07:55 AM    Glucose 106 (H) 06/22/2019 07:55 AM    BUN 12 06/22/2019 07:55 AM    Creatinine 0.71 06/22/2019 07:55 AM    BUN/Creatinine ratio 17 06/22/2019 07:55 AM    GFR est AA >60 06/22/2019 07:55 AM    GFR est non-AA >60 06/22/2019 07:55 AM    Calcium 8.9 06/22/2019 07:55 AM         CT of abdomen/pelvis (9/13/19)  LUNG BASES: Clear. INCIDENTALLY IMAGED HEART AND MEDIASTINUM: The visualized heart is normal in  size without pericardial effusion. 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 Otherwise unremarkable with normal enhancement of vascular structures and no  biliary ductal dilation. GALLBLADDER: Unremarkable. SPLEEN: Unremarkable. PANCREAS: No mass or duct dilation. ADRENALS: Unremarkable with no evident mass. KIDNEYS: No mass, calculus, or hydronephrosis. STOMACH: Unremarkable. SMALL BOWEL: No dilatation or wall thickening. COLON: No dilation or wall thickening. APPENDIX: Unremarkable. PERITONEUM: No ascites or pneumoperitoneum. RETROPERITONEUM: No lymphadenopathy or aortic aneurysm. REPRODUCTIVE ORGANS: Uterus and ovaries appear unremarkable. There is a  prominent left gonadal vein with prominent left parametrial vascular channels  communicating with a varix that extends towards the midline anterior pelvis  posterior to the pubic symphysis. URINARY BLADDER: No mass or calculus. BONES: No acute fracture or aggressive lesion. ADDITIONAL COMMENTS: N/A     IMPRESSION: No adrenal mass or CT evidence of extra-adrenal pheochromocytoma. Incidental note of prominent left gonadal vein and left adnexal prominent  vascular channels, correlate for symptoms of pelvic congestion syndrome and  consider interventional radiology referral for diagnosis and potential therapy  as clinically appropriate. IMPRESSION/PLAN:  Judy Arias is a 48 y.o. female with a working diagnosis of pelvic congestion. I reviewed with Judy Arias her medical records, physical exam, and review of symptoms. I went over her CT images with her and explained the findings.   I cannot say with certainty that the venous congestion in the pelvis is the cause of all of her symptoms, but physiologically and anatomically I can see how it could be related. I discussed with her a laparoscopic hysterectomy with bilateral salpingooophorectomy, as well as ligation of the dilated veins and venous complexes in the pelvis. She was counseled on the risks, benefits, indications, and alternatives of the procedure. Her questions were answered and she wishes to proceed.           Signed By: Lizbeth Daugherty MD     9/26/2019/8:27 AM

## 2019-10-09 ENCOUNTER — HOSPITAL ENCOUNTER (OUTPATIENT)
Dept: PREADMISSION TESTING | Age: 54
Discharge: HOME | End: 2019-10-09
Payer: COMMERCIAL

## 2019-10-09 VITALS
WEIGHT: 132 LBS | TEMPERATURE: 97.8 F | HEIGHT: 67 IN | BODY MASS INDEX: 20.72 KG/M2 | SYSTOLIC BLOOD PRESSURE: 130 MMHG | DIASTOLIC BLOOD PRESSURE: 82 MMHG | HEART RATE: 62 BPM

## 2019-10-09 LAB
ALBUMIN SERPL-MCNC: 4.1 G/DL (ref 3.5–5)
ALBUMIN/GLOB SERPL: 1.6 {RATIO} (ref 1.1–2.2)
ALP SERPL-CCNC: 62 U/L (ref 45–117)
ALT SERPL-CCNC: 12 U/L (ref 12–78)
ANION GAP SERPL CALC-SCNC: 4 MMOL/L (ref 5–15)
AST SERPL-CCNC: 8 U/L (ref 15–37)
BASOPHILS # BLD: 0 K/UL (ref 0–0.1)
BASOPHILS NFR BLD: 1 % (ref 0–1)
BILIRUB SERPL-MCNC: 0.4 MG/DL (ref 0.2–1)
BUN SERPL-MCNC: 11 MG/DL (ref 6–20)
BUN/CREAT SERPL: 16 (ref 12–20)
CALCIUM SERPL-MCNC: 8.9 MG/DL (ref 8.5–10.1)
CHLORIDE SERPL-SCNC: 106 MMOL/L (ref 97–108)
CO2 SERPL-SCNC: 30 MMOL/L (ref 21–32)
CREAT SERPL-MCNC: 0.69 MG/DL (ref 0.55–1.02)
DIFFERENTIAL METHOD BLD: NORMAL
EOSINOPHIL # BLD: 0.2 K/UL (ref 0–0.4)
EOSINOPHIL NFR BLD: 4 % (ref 0–7)
ERYTHROCYTE [DISTWIDTH] IN BLOOD BY AUTOMATED COUNT: 12.6 % (ref 11.5–14.5)
GLOBULIN SER CALC-MCNC: 2.5 G/DL (ref 2–4)
GLUCOSE SERPL-MCNC: 102 MG/DL (ref 65–100)
HCT VFR BLD AUTO: 39.6 % (ref 35–47)
HGB BLD-MCNC: 12.8 G/DL (ref 11.5–16)
IMM GRANULOCYTES # BLD AUTO: 0 K/UL (ref 0–0.04)
IMM GRANULOCYTES NFR BLD AUTO: 0 % (ref 0–0.5)
LYMPHOCYTES # BLD: 1.7 K/UL (ref 0.8–3.5)
LYMPHOCYTES NFR BLD: 36 % (ref 12–49)
MCH RBC QN AUTO: 31 PG (ref 26–34)
MCHC RBC AUTO-ENTMCNC: 32.3 G/DL (ref 30–36.5)
MCV RBC AUTO: 95.9 FL (ref 80–99)
MONOCYTES # BLD: 0.4 K/UL (ref 0–1)
MONOCYTES NFR BLD: 8 % (ref 5–13)
NEUTS SEG # BLD: 2.5 K/UL (ref 1.8–8)
NEUTS SEG NFR BLD: 51 % (ref 32–75)
NRBC # BLD: 0 K/UL (ref 0–0.01)
NRBC BLD-RTO: 0 PER 100 WBC
PLATELET # BLD AUTO: 205 K/UL (ref 150–400)
PMV BLD AUTO: 10 FL (ref 8.9–12.9)
POTASSIUM SERPL-SCNC: 4.2 MMOL/L (ref 3.5–5.1)
PROT SERPL-MCNC: 6.6 G/DL (ref 6.4–8.2)
RBC # BLD AUTO: 4.13 M/UL (ref 3.8–5.2)
SODIUM SERPL-SCNC: 140 MMOL/L (ref 136–145)
WBC # BLD AUTO: 4.8 K/UL (ref 3.6–11)

## 2019-10-09 PROCEDURE — 85025 COMPLETE CBC W/AUTO DIFF WBC: CPT

## 2019-10-09 PROCEDURE — 80053 COMPREHEN METABOLIC PANEL: CPT

## 2019-10-09 RX ORDER — GLUCOSAMINE/CHONDR SU A SOD 750-600 MG
2500 TABLET ORAL
COMMUNITY

## 2019-10-09 RX ORDER — PANTOPRAZOLE SODIUM 40 MG/1
40 TABLET, DELAYED RELEASE ORAL DAILY
COMMUNITY
End: 2020-01-17

## 2019-10-09 NOTE — PERIOP NOTES
Patient verbalizes understanding of preoperative instructions:  Given skin prep chlorhexidine wipes-given written and verbal instructions on use. Pre-Operative Instructions    DO NOT EAT OR DRINK ANYTHING AFTER MIDNIGHT THE NIGHT BEFORE SURGERY.

## 2019-10-15 RX ORDER — FLUTICASONE PROPIONATE 50 MCG
2 SPRAY, SUSPENSION (ML) NASAL DAILY
COMMUNITY
End: 2022-02-27 | Stop reason: ALTCHOICE

## 2019-10-15 NOTE — H&P
[address] [place] [address] [address], [address] [place] [address]  P [phone]  F [phone]    Office Note  Patient ID:  Name:  Kiel Valencia  MRN:  [id number]  :  1965/53 y.o. Date:  10/15/2019      HISTORY OF PRESENT ILLNESS:  Kiel Valencia is a 48 y.o.  postmenopausal female who is being seen for pelvic congestion. She is referred by Dr. Bonifacio Yan. She has been having numerous episodes of dizziness and lightheadedness, but without loss of consciousness. Her workup has been essentially negative, except for on CT scan she was noted to have significantly dilated pelvic veins, suggesting pelvic congestion syndrome. There were prominent left gonadal and parametrial vascular channels. The later communicates with a varix that extends toward the midline anteriorly posterior to the pubic symphysis. I have been asked to see her in consultation to see if this might be related to her episodes. She denies any vaginal bleeding. In addition to the dizziness and lightheadedness, she also reports some numbness in her left leg. ROS:   and GI review:  Negative  Cardiopulmonary review:  Negative   Musculoskeletal:  Negative    A comprehensive review of systems was negative except for that written in the History of Present Illness. , 10 point ROS      OB/GYN ROS:    Hx of tubal ligation  Patient denies any abnormal bleeding or vaginal discharge.        Problem List:  Patient Active Problem List    Diagnosis Date Noted    Pelvic congestion syndrome 2019    Burning sensation of feet 10/31/2016    TIA (transient ischemic attack) 2015    Paresthesia of left arm 2015    Chronic neck pain 2015    Anxiety 2015    Injury of elbow, left 2015    Swelling of joint, elbow, left 2015    GERD (gastroesophageal reflux disease) 2014    Unspecified hypothyroidism 2012    Panic disorder without agoraphobia 05/09/2012     PMH:  Past Medical History:   Diagnosis Date    Anxiety     Arthritis     Cancer ([facility] [address]     BASAL CELL SKIN CANCER ON CHEST    Chronic neck pain 6/27/2015    Fatigue     GERD (gastroesophageal reflux disease) 3/12/2014    Headache     Hiatal hernia     Panic disorder     PUD (peptic ulcer disease)     TIA (transient ischemic attack) 6/27/2015      PSH:  Past Surgical History:   Procedure Laterality Date    HX GYN      BTL    HX HEENT      HX WISDOM TEETH EXTRACTION        Social History:  Social History     Tobacco Use    Smoking status: Never Smoker    Smokeless tobacco: Never Used   Substance Use Topics    Alcohol use: Yes     Alcohol/week: 2.0 standard drinks     Types: 2 Glasses of wine per week     Comment: occ      Family History:  Family History   Problem Relation Age of Onset   [de-identified] Cancer Father     Hypertension Mother     Heart Disease Paternal Grandfather     Anesth Problems Sister         \"OUT OF IT FOR ONE WEEK POST OP\"    Anesth Problems Other     Alcohol abuse Neg Hx     Arthritis-rheumatoid Neg Hx     Asthma Neg Hx     Bleeding Prob Neg Hx     Diabetes Neg Hx     Elevated Lipids Neg Hx     Headache Neg Hx     Lung Disease Neg Hx     Migraines Neg Hx     Psychiatric Disorder Neg Hx     Stroke Neg Hx     Mental Retardation Neg Hx       Medications: (reviewed)  No current facility-administered medications for this encounter. Current Outpatient Medications   Medication Sig    fluticasone propionate (FLONASE ALLERGY RELIEF) 50 mcg/actuation nasal spray 2 Sprays by Both Nostrils route daily.  Biotin 2,500 mcg cap Take 2,500 mcg by mouth.  pantoprazole (PROTONIX) 40 mg tablet Take 40 mg by mouth daily.  CALCIUM PO Take 1,200 mg by mouth daily.  cholecalciferol, vitamin D3, (VITAMIN D3 PO) Take 50 mcg by mouth.  citalopram (CELEXA) 20 mg tablet Take 1 Tab by mouth daily.  (Patient taking differently: Take 10 mg by mouth daily.)    clonazePAM (KLONOPIN) 0.5 mg tablet Take 1 Tab by mouth nightly as needed (anxiety). Max Daily Amount: 0.5 mg.    estradiol (VAGIFEM) 10 mcg tab vaginal tablet Insert 10 mcg into vagina every Monday and Thursday.  valACYclovir (VALTREX) 500 mg tablet Take 500 mg by mouth as needed. Allergies: (reviewed)  Allergies   Allergen Reactions    Naproxen Other (comments)     Abdominal Pain    Other Medication Other (comments)     Z-PACK: FELT WEAK FOR ONE WEEK AFTER TAKING AND VERY LIGHT HEADED. OBJECTIVE:    Physical Exam:  VITAL SIGNS: There were no vitals filed for this visit. There is no height or weight on file to calculate BMI. GENERAL ARON: Conversant, alert, oriented. No acute distress. HEENT: HEENT. No thyroid enlargement. No JVD. Neck: Supple without restrictions. RESPIRATORY: Clear to auscultation and percussion to the bases. No CVAT. CARDIOVASC: RRR without murmur/rub. GASTROINT: soft, non-tender, without masses or organomegaly   MUSCULOSKEL: no joint tenderness, deformity or swelling   EXTREMITIES: extremities normal, atraumatic, no cyanosis or edema   PELVIC: Vulva and vagina appear normal. Bimanual exam reveals normal uterus and adnexa. RECTAL: Deferred   NADYA SURVEY: No suspicious lymphadenopathy or edema noted. NEURO: Grossly intact. No acute deficit.        Lab Data:    Lab Results   Component Value Date/Time    WBC 4.8 10/09/2019 11:40 AM    HGB 12.8 10/09/2019 11:40 AM    HCT 39.6 10/09/2019 11:40 AM    PLATELET 953 93/44/5750 11:40 AM    MCV 95.9 10/09/2019 11:40 AM     Lab Results   Component Value Date/Time    Sodium 140 10/09/2019 11:40 AM    Potassium 4.2 10/09/2019 11:40 AM    Chloride 106 10/09/2019 11:40 AM    CO2 30 10/09/2019 11:40 AM    Anion gap 4 (L) 10/09/2019 11:40 AM    Glucose 102 (H) 10/09/2019 11:40 AM    BUN 11 10/09/2019 11:40 AM    Creatinine 0.69 10/09/2019 11:40 AM    BUN/Creatinine ratio 16 10/09/2019 11:40 AM    GFR est AA >60 10/09/2019 11:40 AM    GFR est non-AA >60 10/09/2019 11:40 AM    Calcium 8.9 10/09/2019 11:40 AM         CT of abdomen/pelvis (9/13/19)  LUNG BASES: Clear. INCIDENTALLY IMAGED HEART AND MEDIASTINUM: The visualized heart is normal in  size without pericardial effusion. 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 Otherwise unremarkable with normal enhancement of vascular structures and no  biliary ductal dilation. GALLBLADDER: Unremarkable. SPLEEN: Unremarkable. PANCREAS: No mass or duct dilation. ADRENALS: Unremarkable with no evident mass. KIDNEYS: No mass, calculus, or hydronephrosis. STOMACH: Unremarkable. SMALL BOWEL: No dilatation or wall thickening. COLON: No dilation or wall thickening. APPENDIX: Unremarkable. PERITONEUM: No ascites or pneumoperitoneum. RETROPERITONEUM: No lymphadenopathy or aortic aneurysm. REPRODUCTIVE ORGANS: Uterus and ovaries appear unremarkable. There is a  prominent left gonadal vein with prominent left parametrial vascular channels  communicating with a varix that extends towards the midline anterior pelvis  posterior to the pubic symphysis. URINARY BLADDER: No mass or calculus. BONES: No acute fracture or aggressive lesion. ADDITIONAL COMMENTS: N/A     IMPRESSION: No adrenal mass or CT evidence of extra-adrenal pheochromocytoma. Incidental note of prominent left gonadal vein and left adnexal prominent  vascular channels, correlate for symptoms of pelvic congestion syndrome and  consider interventional radiology referral for diagnosis and potential therapy  as clinically appropriate. IMPRESSION/PLAN:  Telma Garcia is a 48 y.o. female with a working diagnosis of pelvic congestion. I reviewed with Telma Garcia her medical records, physical exam, and review of symptoms. I went over her CT images with her and explained the findings.   I cannot say with certainty that the venous congestion in the pelvis is the cause of all of her symptoms, but physiologically and anatomically I can see how it could be related. I discussed with her a laparoscopic hysterectomy with bilateral salpingooophorectomy, as well as ligation of the dilated veins and venous complexes in the pelvis. She was counseled on the risks, benefits, indications, and alternatives of the procedure. Her questions were answered and she wishes to proceed.           Signed By: Janet Joshi MD     10/15/2019/8:27 AM

## 2019-10-16 ENCOUNTER — ANESTHESIA EVENT (OUTPATIENT)
Dept: SURGERY | Age: 54
End: 2019-10-16
Payer: COMMERCIAL

## 2019-10-16 ENCOUNTER — ANESTHESIA (OUTPATIENT)
Dept: SURGERY | Age: 54
End: 2019-10-16
Payer: COMMERCIAL

## 2019-10-16 ENCOUNTER — HOSPITAL ENCOUNTER (OUTPATIENT)
Age: 54
Setting detail: OBSERVATION
Discharge: HOME | End: 2019-10-17
Attending: OBSTETRICS & GYNECOLOGY | Admitting: OBSTETRICS & GYNECOLOGY
Payer: COMMERCIAL

## 2019-10-16 DIAGNOSIS — G89.18 ACUTE POSTOPERATIVE PAIN: Primary | ICD-10-CM

## 2019-10-16 PROBLEM — Z90.710 S/P HYSTERECTOMY: Status: ACTIVE | Noted: 2019-10-16

## 2019-10-16 PROCEDURE — 76010000153 HC OR TIME 1.5 TO 2 HR: Performed by: OBSTETRICS & GYNECOLOGY

## 2019-10-16 PROCEDURE — 76210000006 HC OR PH I REC 0.5 TO 1 HR: Performed by: OBSTETRICS & GYNECOLOGY

## 2019-10-16 PROCEDURE — 77030002903 HC SUT DEV PLCMNT LSIS -C: Performed by: OBSTETRICS & GYNECOLOGY

## 2019-10-16 PROCEDURE — 77030002904 HC SUT DEV RNNG LSIS -C: Performed by: OBSTETRICS & GYNECOLOGY

## 2019-10-16 PROCEDURE — 88112 CYTOPATH CELL ENHANCE TECH: CPT

## 2019-10-16 PROCEDURE — 74011000250 HC RX REV CODE- 250: Performed by: OBSTETRICS & GYNECOLOGY

## 2019-10-16 PROCEDURE — 77030012770 HC TRCR OPT FX AMR -B: Performed by: OBSTETRICS & GYNECOLOGY

## 2019-10-16 PROCEDURE — 77030026438 HC STYL ET INTUB CARD -A: Performed by: NURSE ANESTHETIST, CERTIFIED REGISTERED

## 2019-10-16 PROCEDURE — 77030020263 HC SOL INJ SOD CL0.9% LFCR 1000ML: Performed by: OBSTETRICS & GYNECOLOGY

## 2019-10-16 PROCEDURE — 77030010507 HC ADH SKN DERMBND J&J -B: Performed by: OBSTETRICS & GYNECOLOGY

## 2019-10-16 PROCEDURE — 74011000258 HC RX REV CODE- 258: Performed by: OBSTETRICS & GYNECOLOGY

## 2019-10-16 PROCEDURE — 77030033639 HC SHR ENDO COAG HARM 36 J&J -E: Performed by: OBSTETRICS & GYNECOLOGY

## 2019-10-16 PROCEDURE — 77030040361 HC SLV COMPR DVT MDII -B: Performed by: OBSTETRICS & GYNECOLOGY

## 2019-10-16 PROCEDURE — 74011250637 HC RX REV CODE- 250/637: Performed by: ANESTHESIOLOGY

## 2019-10-16 PROCEDURE — 74011250637 HC RX REV CODE- 250/637: Performed by: OBSTETRICS & GYNECOLOGY

## 2019-10-16 PROCEDURE — 77030002882 HC SUT CART KNOT LSIS -B: Performed by: OBSTETRICS & GYNECOLOGY

## 2019-10-16 PROCEDURE — 99218 HC RM OBSERVATION: CPT

## 2019-10-16 PROCEDURE — 77030002933 HC SUT MCRYL J&J -A: Performed by: OBSTETRICS & GYNECOLOGY

## 2019-10-16 PROCEDURE — 74011250636 HC RX REV CODE- 250/636: Performed by: ANESTHESIOLOGY

## 2019-10-16 PROCEDURE — 77030008756 HC TU IRR SUC STRY -B: Performed by: OBSTETRICS & GYNECOLOGY

## 2019-10-16 PROCEDURE — 74011250636 HC RX REV CODE- 250/636: Performed by: NURSE ANESTHETIST, CERTIFIED REGISTERED

## 2019-10-16 PROCEDURE — 77030020829: Performed by: OBSTETRICS & GYNECOLOGY

## 2019-10-16 PROCEDURE — 88307 TISSUE EXAM BY PATHOLOGIST: CPT

## 2019-10-16 PROCEDURE — 77030018836 HC SOL IRR NACL ICUM -A: Performed by: OBSTETRICS & GYNECOLOGY

## 2019-10-16 PROCEDURE — 74011250636 HC RX REV CODE- 250/636: Performed by: OBSTETRICS & GYNECOLOGY

## 2019-10-16 PROCEDURE — 77030038160 HC SHR COAG HARM J&J -F: Performed by: OBSTETRICS & GYNECOLOGY

## 2019-10-16 PROCEDURE — 77030040830 HC CATH URETH FOL MDII -A: Performed by: OBSTETRICS & GYNECOLOGY

## 2019-10-16 PROCEDURE — 76060000034 HC ANESTHESIA 1.5 TO 2 HR: Performed by: OBSTETRICS & GYNECOLOGY

## 2019-10-16 PROCEDURE — 74011000250 HC RX REV CODE- 250: Performed by: NURSE ANESTHETIST, CERTIFIED REGISTERED

## 2019-10-16 PROCEDURE — 77030011640 HC PAD GRND REM COVD -A: Performed by: OBSTETRICS & GYNECOLOGY

## 2019-10-16 PROCEDURE — 77030008684 HC TU ET CUF COVD -B: Performed by: NURSE ANESTHETIST, CERTIFIED REGISTERED

## 2019-10-16 RX ORDER — EPHEDRINE SULFATE/0.9% NACL/PF 50 MG/5 ML
SYRINGE (ML) INTRAVENOUS AS NEEDED
Status: DISCONTINUED | OUTPATIENT
Start: 2019-10-16 | End: 2019-10-16 | Stop reason: HOSPADM

## 2019-10-16 RX ORDER — DEXAMETHASONE SODIUM PHOSPHATE 4 MG/ML
INJECTION, SOLUTION INTRA-ARTICULAR; INTRALESIONAL; INTRAMUSCULAR; INTRAVENOUS; SOFT TISSUE AS NEEDED
Status: DISCONTINUED | OUTPATIENT
Start: 2019-10-16 | End: 2019-10-16 | Stop reason: HOSPADM

## 2019-10-16 RX ORDER — ROPIVACAINE HYDROCHLORIDE 5 MG/ML
30 INJECTION, SOLUTION EPIDURAL; INFILTRATION; PERINEURAL AS NEEDED
Status: DISCONTINUED | OUTPATIENT
Start: 2019-10-16 | End: 2019-10-16 | Stop reason: HOSPADM

## 2019-10-16 RX ORDER — FLUTICASONE PROPIONATE 50 MCG
2 SPRAY, SUSPENSION (ML) NASAL DAILY
Status: DISCONTINUED | OUTPATIENT
Start: 2019-10-17 | End: 2019-10-17 | Stop reason: HOSPADM

## 2019-10-16 RX ORDER — PANTOPRAZOLE SODIUM 40 MG/1
40 TABLET, DELAYED RELEASE ORAL DAILY
Status: DISCONTINUED | OUTPATIENT
Start: 2019-10-17 | End: 2019-10-17 | Stop reason: HOSPADM

## 2019-10-16 RX ORDER — PROPOFOL 10 MG/ML
INJECTION, EMULSION INTRAVENOUS AS NEEDED
Status: DISCONTINUED | OUTPATIENT
Start: 2019-10-16 | End: 2019-10-16 | Stop reason: HOSPADM

## 2019-10-16 RX ORDER — SODIUM CHLORIDE 9 MG/ML
100 INJECTION, SOLUTION INTRAVENOUS CONTINUOUS
Status: DISCONTINUED | OUTPATIENT
Start: 2019-10-16 | End: 2019-10-17 | Stop reason: HOSPADM

## 2019-10-16 RX ORDER — MIDAZOLAM HYDROCHLORIDE 1 MG/ML
1 INJECTION, SOLUTION INTRAMUSCULAR; INTRAVENOUS AS NEEDED
Status: DISCONTINUED | OUTPATIENT
Start: 2019-10-16 | End: 2019-10-16 | Stop reason: HOSPADM

## 2019-10-16 RX ORDER — FENTANYL CITRATE 50 UG/ML
50 INJECTION, SOLUTION INTRAMUSCULAR; INTRAVENOUS AS NEEDED
Status: DISCONTINUED | OUTPATIENT
Start: 2019-10-16 | End: 2019-10-16 | Stop reason: HOSPADM

## 2019-10-16 RX ORDER — SODIUM CHLORIDE 0.9 % (FLUSH) 0.9 %
5-40 SYRINGE (ML) INJECTION AS NEEDED
Status: DISCONTINUED | OUTPATIENT
Start: 2019-10-16 | End: 2019-10-16 | Stop reason: HOSPADM

## 2019-10-16 RX ORDER — SODIUM CHLORIDE 0.9 % (FLUSH) 0.9 %
5-40 SYRINGE (ML) INJECTION EVERY 8 HOURS
Status: DISCONTINUED | OUTPATIENT
Start: 2019-10-16 | End: 2019-10-17 | Stop reason: HOSPADM

## 2019-10-16 RX ORDER — ONDANSETRON 2 MG/ML
4 INJECTION INTRAMUSCULAR; INTRAVENOUS
Status: DISCONTINUED | OUTPATIENT
Start: 2019-10-16 | End: 2019-10-17 | Stop reason: HOSPADM

## 2019-10-16 RX ORDER — SODIUM CHLORIDE, SODIUM LACTATE, POTASSIUM CHLORIDE, CALCIUM CHLORIDE 600; 310; 30; 20 MG/100ML; MG/100ML; MG/100ML; MG/100ML
100 INJECTION, SOLUTION INTRAVENOUS CONTINUOUS
Status: DISCONTINUED | OUTPATIENT
Start: 2019-10-16 | End: 2019-10-16 | Stop reason: HOSPADM

## 2019-10-16 RX ORDER — ROCURONIUM BROMIDE 10 MG/ML
INJECTION, SOLUTION INTRAVENOUS AS NEEDED
Status: DISCONTINUED | OUTPATIENT
Start: 2019-10-16 | End: 2019-10-16 | Stop reason: HOSPADM

## 2019-10-16 RX ORDER — DIPHENHYDRAMINE HYDROCHLORIDE 50 MG/ML
12.5 INJECTION, SOLUTION INTRAMUSCULAR; INTRAVENOUS
Status: DISCONTINUED | OUTPATIENT
Start: 2019-10-16 | End: 2019-10-17 | Stop reason: HOSPADM

## 2019-10-16 RX ORDER — LIDOCAINE HYDROCHLORIDE 10 MG/ML
0.1 INJECTION, SOLUTION EPIDURAL; INFILTRATION; INTRACAUDAL; PERINEURAL AS NEEDED
Status: DISCONTINUED | OUTPATIENT
Start: 2019-10-16 | End: 2019-10-16 | Stop reason: HOSPADM

## 2019-10-16 RX ORDER — CITALOPRAM 20 MG/1
20 TABLET, FILM COATED ORAL DAILY
Status: DISCONTINUED | OUTPATIENT
Start: 2019-10-17 | End: 2019-10-17 | Stop reason: HOSPADM

## 2019-10-16 RX ORDER — MORPHINE SULFATE 10 MG/ML
2 INJECTION, SOLUTION INTRAMUSCULAR; INTRAVENOUS
Status: DISCONTINUED | OUTPATIENT
Start: 2019-10-16 | End: 2019-10-16 | Stop reason: HOSPADM

## 2019-10-16 RX ORDER — MIDAZOLAM HYDROCHLORIDE 1 MG/ML
INJECTION, SOLUTION INTRAMUSCULAR; INTRAVENOUS AS NEEDED
Status: DISCONTINUED | OUTPATIENT
Start: 2019-10-16 | End: 2019-10-16 | Stop reason: HOSPADM

## 2019-10-16 RX ORDER — OXYCODONE HYDROCHLORIDE 5 MG/1
5 TABLET ORAL
Status: DISCONTINUED | OUTPATIENT
Start: 2019-10-16 | End: 2019-10-17 | Stop reason: HOSPADM

## 2019-10-16 RX ORDER — SODIUM CHLORIDE 9 MG/ML
25 INJECTION, SOLUTION INTRAVENOUS CONTINUOUS
Status: DISCONTINUED | OUTPATIENT
Start: 2019-10-16 | End: 2019-10-16 | Stop reason: HOSPADM

## 2019-10-16 RX ORDER — DEXMEDETOMIDINE HYDROCHLORIDE 100 UG/ML
INJECTION, SOLUTION INTRAVENOUS AS NEEDED
Status: DISCONTINUED | OUTPATIENT
Start: 2019-10-16 | End: 2019-10-16 | Stop reason: HOSPADM

## 2019-10-16 RX ORDER — SUCCINYLCHOLINE CHLORIDE 20 MG/ML
INJECTION INTRAMUSCULAR; INTRAVENOUS AS NEEDED
Status: DISCONTINUED | OUTPATIENT
Start: 2019-10-16 | End: 2019-10-16 | Stop reason: HOSPADM

## 2019-10-16 RX ORDER — ACETAMINOPHEN 325 MG/1
650 TABLET ORAL EVERY 6 HOURS
Status: DISCONTINUED | OUTPATIENT
Start: 2019-10-16 | End: 2019-10-17 | Stop reason: HOSPADM

## 2019-10-16 RX ORDER — GLYCOPYRROLATE 0.2 MG/ML
INJECTION INTRAMUSCULAR; INTRAVENOUS AS NEEDED
Status: DISCONTINUED | OUTPATIENT
Start: 2019-10-16 | End: 2019-10-16 | Stop reason: HOSPADM

## 2019-10-16 RX ORDER — ONDANSETRON 2 MG/ML
INJECTION INTRAMUSCULAR; INTRAVENOUS AS NEEDED
Status: DISCONTINUED | OUTPATIENT
Start: 2019-10-16 | End: 2019-10-16 | Stop reason: HOSPADM

## 2019-10-16 RX ORDER — PROCHLORPERAZINE EDISYLATE 5 MG/ML
10 INJECTION INTRAMUSCULAR; INTRAVENOUS
Status: DISCONTINUED | OUTPATIENT
Start: 2019-10-16 | End: 2019-10-17 | Stop reason: HOSPADM

## 2019-10-16 RX ORDER — KETOROLAC TROMETHAMINE 30 MG/ML
INJECTION, SOLUTION INTRAMUSCULAR; INTRAVENOUS AS NEEDED
Status: DISCONTINUED | OUTPATIENT
Start: 2019-10-16 | End: 2019-10-16 | Stop reason: HOSPADM

## 2019-10-16 RX ORDER — CLONAZEPAM 0.5 MG/1
0.5 TABLET ORAL
Status: DISCONTINUED | OUTPATIENT
Start: 2019-10-16 | End: 2019-10-17 | Stop reason: HOSPADM

## 2019-10-16 RX ORDER — SODIUM CHLORIDE 0.9 % (FLUSH) 0.9 %
5-40 SYRINGE (ML) INJECTION EVERY 8 HOURS
Status: DISCONTINUED | OUTPATIENT
Start: 2019-10-16 | End: 2019-10-16 | Stop reason: HOSPADM

## 2019-10-16 RX ORDER — KETAMINE HYDROCHLORIDE 10 MG/ML
INJECTION, SOLUTION INTRAMUSCULAR; INTRAVENOUS AS NEEDED
Status: DISCONTINUED | OUTPATIENT
Start: 2019-10-16 | End: 2019-10-16 | Stop reason: HOSPADM

## 2019-10-16 RX ORDER — SODIUM CHLORIDE 0.9 % (FLUSH) 0.9 %
5-40 SYRINGE (ML) INJECTION AS NEEDED
Status: DISCONTINUED | OUTPATIENT
Start: 2019-10-16 | End: 2019-10-17 | Stop reason: HOSPADM

## 2019-10-16 RX ORDER — HYDROMORPHONE HYDROCHLORIDE 1 MG/ML
1 INJECTION, SOLUTION INTRAMUSCULAR; INTRAVENOUS; SUBCUTANEOUS
Status: DISCONTINUED | OUTPATIENT
Start: 2019-10-16 | End: 2019-10-17 | Stop reason: HOSPADM

## 2019-10-16 RX ORDER — HYDROMORPHONE HYDROCHLORIDE 1 MG/ML
0.5 INJECTION, SOLUTION INTRAMUSCULAR; INTRAVENOUS; SUBCUTANEOUS
Status: DISCONTINUED | OUTPATIENT
Start: 2019-10-16 | End: 2019-10-16 | Stop reason: HOSPADM

## 2019-10-16 RX ORDER — FENTANYL CITRATE 50 UG/ML
INJECTION, SOLUTION INTRAMUSCULAR; INTRAVENOUS AS NEEDED
Status: DISCONTINUED | OUTPATIENT
Start: 2019-10-16 | End: 2019-10-16 | Stop reason: HOSPADM

## 2019-10-16 RX ORDER — DIPHENHYDRAMINE HYDROCHLORIDE 50 MG/ML
12.5 INJECTION, SOLUTION INTRAMUSCULAR; INTRAVENOUS AS NEEDED
Status: DISCONTINUED | OUTPATIENT
Start: 2019-10-16 | End: 2019-10-16 | Stop reason: HOSPADM

## 2019-10-16 RX ORDER — LORAZEPAM 2 MG/ML
1 INJECTION INTRAMUSCULAR
Status: DISCONTINUED | OUTPATIENT
Start: 2019-10-16 | End: 2019-10-17 | Stop reason: HOSPADM

## 2019-10-16 RX ORDER — MIDAZOLAM HYDROCHLORIDE 1 MG/ML
0.5 INJECTION, SOLUTION INTRAMUSCULAR; INTRAVENOUS
Status: DISCONTINUED | OUTPATIENT
Start: 2019-10-16 | End: 2019-10-16 | Stop reason: HOSPADM

## 2019-10-16 RX ORDER — FENTANYL CITRATE 50 UG/ML
25 INJECTION, SOLUTION INTRAMUSCULAR; INTRAVENOUS
Status: DISCONTINUED | OUTPATIENT
Start: 2019-10-16 | End: 2019-10-16 | Stop reason: HOSPADM

## 2019-10-16 RX ORDER — NEOSTIGMINE METHYLSULFATE 1 MG/ML
INJECTION INTRAVENOUS AS NEEDED
Status: DISCONTINUED | OUTPATIENT
Start: 2019-10-16 | End: 2019-10-16 | Stop reason: HOSPADM

## 2019-10-16 RX ORDER — ONDANSETRON 2 MG/ML
4 INJECTION INTRAMUSCULAR; INTRAVENOUS AS NEEDED
Status: DISCONTINUED | OUTPATIENT
Start: 2019-10-16 | End: 2019-10-16 | Stop reason: HOSPADM

## 2019-10-16 RX ORDER — LIDOCAINE HYDROCHLORIDE 20 MG/ML
INJECTION, SOLUTION EPIDURAL; INFILTRATION; INTRACAUDAL; PERINEURAL AS NEEDED
Status: DISCONTINUED | OUTPATIENT
Start: 2019-10-16 | End: 2019-10-16 | Stop reason: HOSPADM

## 2019-10-16 RX ORDER — KETOROLAC TROMETHAMINE 30 MG/ML
30 INJECTION, SOLUTION INTRAMUSCULAR; INTRAVENOUS EVERY 6 HOURS
Status: DISCONTINUED | OUTPATIENT
Start: 2019-10-16 | End: 2019-10-17 | Stop reason: HOSPADM

## 2019-10-16 RX ORDER — ACETAMINOPHEN 325 MG/1
650 TABLET ORAL ONCE
Status: COMPLETED | OUTPATIENT
Start: 2019-10-16 | End: 2019-10-16

## 2019-10-16 RX ADMIN — PROPOFOL 50 MG: 10 INJECTION, EMULSION INTRAVENOUS at 10:51

## 2019-10-16 RX ADMIN — Medication 10 MG: at 11:30

## 2019-10-16 RX ADMIN — ACETAMINOPHEN 650 MG: 325 TABLET, FILM COATED ORAL at 20:49

## 2019-10-16 RX ADMIN — OXYCODONE HYDROCHLORIDE 5 MG: 5 TABLET ORAL at 22:23

## 2019-10-16 RX ADMIN — KETAMINE HYDROCHLORIDE 20 MG: 10 INJECTION, SOLUTION INTRAMUSCULAR; INTRAVENOUS at 10:55

## 2019-10-16 RX ADMIN — KETOROLAC TROMETHAMINE 30 MG: 30 INJECTION, SOLUTION INTRAMUSCULAR; INTRAVENOUS at 11:52

## 2019-10-16 RX ADMIN — DEXMEDETOMIDINE HYDROCHLORIDE 8 MCG: 100 INJECTION, SOLUTION, CONCENTRATE INTRAVENOUS at 11:50

## 2019-10-16 RX ADMIN — GLYCOPYRROLATE 0.2 MG: 0.2 INJECTION, SOLUTION INTRAMUSCULAR; INTRAVENOUS at 11:25

## 2019-10-16 RX ADMIN — ACETAMINOPHEN 650 MG: 325 TABLET, FILM COATED ORAL at 09:40

## 2019-10-16 RX ADMIN — KETOROLAC TROMETHAMINE 30 MG: 30 INJECTION, SOLUTION INTRAMUSCULAR at 17:50

## 2019-10-16 RX ADMIN — GLYCOPYRROLATE 0.4 MG: 0.2 INJECTION, SOLUTION INTRAMUSCULAR; INTRAVENOUS at 11:46

## 2019-10-16 RX ADMIN — FENTANYL CITRATE 50 MCG: 50 INJECTION, SOLUTION INTRAMUSCULAR; INTRAVENOUS at 10:58

## 2019-10-16 RX ADMIN — CEFAZOLIN 1 G: 1 INJECTION, POWDER, FOR SOLUTION INTRAMUSCULAR; INTRAVENOUS at 22:23

## 2019-10-16 RX ADMIN — ONDANSETRON HYDROCHLORIDE 4 MG: 2 INJECTION, SOLUTION INTRAMUSCULAR; INTRAVENOUS at 11:48

## 2019-10-16 RX ADMIN — DEXAMETHASONE SODIUM PHOSPHATE 8 MG: 4 INJECTION, SOLUTION INTRAMUSCULAR; INTRAVENOUS at 10:55

## 2019-10-16 RX ADMIN — SUCCINYLCHOLINE CHLORIDE 140 MG: 20 INJECTION, SOLUTION INTRAMUSCULAR; INTRAVENOUS at 10:48

## 2019-10-16 RX ADMIN — FENTANYL CITRATE 50 MCG: 50 INJECTION, SOLUTION INTRAMUSCULAR; INTRAVENOUS at 10:48

## 2019-10-16 RX ADMIN — PROPOFOL 150 MG: 10 INJECTION, EMULSION INTRAVENOUS at 10:48

## 2019-10-16 RX ADMIN — MIDAZOLAM 2 MG: 1 INJECTION INTRAMUSCULAR; INTRAVENOUS at 10:44

## 2019-10-16 RX ADMIN — ROCURONIUM BROMIDE 10 MG: 10 SOLUTION INTRAVENOUS at 10:48

## 2019-10-16 RX ADMIN — ROCURONIUM BROMIDE 20 MG: 10 SOLUTION INTRAVENOUS at 10:52

## 2019-10-16 RX ADMIN — LIDOCAINE HYDROCHLORIDE 100 MG: 20 INJECTION, SOLUTION EPIDURAL; INFILTRATION; INTRACAUDAL; PERINEURAL at 10:48

## 2019-10-16 RX ADMIN — CEFOTETAN DISODIUM 2 G: 2 INJECTION, POWDER, FOR SOLUTION INTRAMUSCULAR; INTRAVENOUS at 10:56

## 2019-10-16 RX ADMIN — NEOSTIGMINE METHYLSULFATE 3 MG: 1 INJECTION, SOLUTION INTRAMUSCULAR; INTRAVENOUS; SUBCUTANEOUS at 11:46

## 2019-10-16 RX ADMIN — SODIUM CHLORIDE, SODIUM LACTATE, POTASSIUM CHLORIDE, AND CALCIUM CHLORIDE 100 ML/HR: 600; 310; 30; 20 INJECTION, SOLUTION INTRAVENOUS at 09:40

## 2019-10-16 NOTE — PERIOP NOTES
Family updated in surgical wait area- spoke to pts mother and gave update, pt sleeping and is awaiting room assignment

## 2019-10-16 NOTE — OP NOTES
Gynecologic Oncology Operative Report    Ghulam Motta  10/16/2019    Pre-operative dx:  Pelvic congestion    Post-operative dx:  Pelvic congestion    Procedure: 1) Total laparoscopic hysterectomy    2) Bilateral salpingo-oophorectomy    Surgeon:  Jim Fernández MD    Assistant:  Eboni Martines SA     Anesthesia:  GETA    EBL:  25 cc    Complications:  None    Specimens:    ID Type Source Tests Collected by Time Destination   1 : UTERUS, CERVIX, BILATERAL FALLOPIAN TUBES AND OVARIES Fresh Uterus   Shelia Causey MD 10/16/2019 1136 Pathology   1 : PELVIC WASHINGS Fresh Pelvis   Shelia Causey MD 10/16/2019 1111 Cytology     Operative indications:  49 yo WF with pelvic pressure. Imaging demonstrated dilated pelvic vessels, consistent with pelvic congestion syndrome. I recommended laparoscopic hysterectomy. Operative findings:  Normal appearing uterus, tubes, and ovaries. Dilated pelvic vessels. Procedure in detail:  After the risks, benefits, indications, and alternatives of the procedure were discussed with the patient and informed consent was obtained, the patient was taken to the operating room. She was positively identified, administered general anesthesia, and then placed in the dorsal lithotomy position in [address] [place] [address]. An exam under anesthesia was performed. She was then prepped and draped in the usual fashion. A darden catheter was inserted, followed by a V-Care uterine manipulator. We then proceeded with laparoscopy by grasping the umbilicus on either side with Allis clamps. A small incision was made at the base with an #11-blade scalpel. A 5 mm blade-less trocar was then directly inserted, with the camera in position to visualize safe entry. Once proper placement was confirmed, the abdomen was then insufflated with carbon dioxide. A 5 mm blade-less trocar was then inserted in each lower quadrant, midway between the anterior superior iliac spine and the umbilicus.   These trocars were inserted under direct visualization to ensure safe entry. The abdomen and pelvis were then examined, with the above mentioned findings noted. Pelvic washings were obtained as well. The patient was then placed in Trendelenburg tilt and we then proceeded with the hysterectomy. The left round ligament was identified, then coagulated and transected with the Harmonic Scalpel, allowing entry into the retroperitoneal space. The vesico-uterine peritoneum was divided with the Harmonic Scalpel from the left side across the midline, allowing visualization of the ring of the V-Care manipulator anteriorly. We then identified the left ureter and bluntly developed the perirectal space. The left uterine artery was identified at its origin off of the hypogastric artery, and it was coagulated and transected with the Harmonic Scalpel at that point, with the ureter being held on traction medially to ensure its safety. The left ovarian vessels were then isolated and then coagulated and transected with the Harmonic Scalpel. The posterior leaf of the broad ligament was then divided with the Harmonic Scalpel up to the level of the uterine body. We then directed our attention to the right side of the pelvis. The right round ligament was identified and then coagulated and transected with the Harmonic Scalpel, allowing entry into the retroperitoneal space. The remaining vesico-uterine peritoneum was then divided with the Harmonic Scalpel on the right side. The right ureter was then identified and the perirectal space was bluntly developed. The right uterine artery was then identified at its origin off of the hypogastric artery. It was coagulated and transected with the Harmonic Scalpel at that point, with the ureter being held on traction medially to ensure its safety. The right ovarian vessels were then isolated and then coagulated and transected with the Harmonic Scalpel.   The posterior leaf of the broad ligament was then divided with the Harmonic Scalpel up to the level of the uterine body. We then moved anteriorly and the bladder was sharply dissected off of the lower uterine segment and cervix until it was well below the ring of the [employer]. The uterine arteries were then skeletonized bilaterally and then coagulated and transected with the Harmonic Scalpel at the level of the V-Care ring. A circumferential colpotomy was then performed by using the active blade of the Harmonic Scalpel to cut down onto the V-Care ring. Once the colpotomy was completed, the specimen was delivered transvaginally and sent to pathology. A bulb syringe was then placed into the vagina to maintain pneumoperitoneum for vaginal cuff closure. The cuff was then reapproximated with three figure-of-eight stitches of 2-0 PDS suture using the LSI RD-180 suturing device. The sutures were secured in place with the LSI Ti-Knot device. Excellent reapproximation and hemostasis was noted. The pelvis was then irrigated and all pedicles inspected. Once satisfied with hemostasis, the gas was then allowed to escape from the abdomen and the trocars were removed. She was then taken out of Trendelenburg tilt. The incision sites were closed with a stitch of 4-0 Monocryl, followed by a layer of Dermabond. The bulb syringe was then removed from the vagina. The patient was taken out of stirrups, awakened from anesthesia, and taken to the recovery room in stable condition. All instrument, sponge, and needle counts were correct.         Charu Hawkins MD  10/16/2019  11:46 AM

## 2019-10-16 NOTE — ANESTHESIA PREPROCEDURE EVALUATION
Relevant Problems   No relevant active problems       Anesthetic History   No history of anesthetic complications            Review of Systems / Medical History  Patient summary reviewed, nursing notes reviewed and pertinent labs reviewed    Pulmonary  Within defined limits                 Neuro/Psych       CVA  TIA     Cardiovascular  Within defined limits                Exercise tolerance: >4 METS     GI/Hepatic/Renal  Within defined limits   GERD      Hiatal hernia and PUD     Endo/Other  Within defined limits    Hypothyroidism  Arthritis and cancer     Other Findings              Physical Exam    Airway  Mallampati: II  TM Distance: 4 - 6 cm  Neck ROM: normal range of motion   Mouth opening: Normal     Cardiovascular  Regular rate and rhythm,  S1 and S2 normal,  no murmur, click, rub, or gallop             Dental  No notable dental hx       Pulmonary  Breath sounds clear to auscultation               Abdominal  GI exam deferred       Other Findings            Anesthetic Plan    ASA: 3  Anesthesia type: general          Induction: Intravenous  Anesthetic plan and risks discussed with: Patient

## 2019-10-16 NOTE — BRIEF OP NOTE
BRIEF OPERATIVE NOTE    Date of Procedure: 10/16/2019   Preoperative Diagnosis: PELVIC CONGESTION  Postoperative Diagnosis: PELVIC CONGESTION    Procedure(s):  TOTAL LAPAROSCOPIC HYSTERECTOMY, BILATERAL SALPINGOOOPHORECTOMY  Surgeon(s) and Role:     Maria Luisa Kelly MD - Primary         Surgical Staff:  Circ-1: Zakiya Campos RN  Scrub Tech-1: Erica Rolle RN-Relief: Jennie Reese RN  Surg Asst-1: Johnathan KHAN  Event Time In Time Out   Incision Start 1103    Incision Close       Anesthesia: General   Estimated Blood Loss: 25 cc  Specimens:   ID Type Source Tests Collected by Time Destination   1 : UTERUS, CERVIX, BILATERAL FALLOPIAN TUBES AND OVARIES Fresh Uterus  Carlos Hopson MD 10/16/2019 1136 Pathology   1 : PELVIC WASHINGS Fresh Pelvis  Carlos Hopson MD 10/16/2019 1111 Cytology      Findings: Normal appearing uterus, tubes, and ovaries. Dilated pelvic vessels.    Complications: None  Implants: * No implants in log *    Иван Churchill MD

## 2019-10-16 NOTE — PERIOP NOTES
TRANSFER - OUT REPORT:    Verbal report given to adrienne (name) on Pascale Hurt  being transferred to [facility](unit) for routine post - op       Report consisted of patients Situation, Background, Assessment and   Recommendations(SBAR). Time Pre op antibiotic given: 2 grams cefotan at 1056  Anesthesia Stop time: 8652  Justin Present on Transfer to floor: yes  Order for Justin on Chart: yes    Discharge Prescriptions with Chart: no   Information from the following report(s) SBAR, OR Summary, Procedure Summary, Intake/Output, MAR, Recent Results and Cardiac Rhythm nsr was reviewed with the receiving nurse. Opportunity for questions and clarification was provided. Is the patient on 02? YES       L/Min 2       Other     Is the patient on a monitor? YES    Is the nurse transporting with the patient? YES    Surgical Waiting Area notified of patient's transfer from PACU?  YES      The following personal items collected during your admission accompanied patient upon transfer:   Dental Appliance: Dental Appliances: None  Vision:    Hearing Aid:    Jewelry:    Clothing: Clothing: (clothes and glasses to pacu)  Other Valuables:    Valuables sent to safe:      Clothing bag and glasses up with pt

## 2019-10-16 NOTE — ROUTINE PROCESS
Patient: Radha Montes MRN: [id number]  SSN: [id number] YOB: 1965  Age: 48 y.o. Sex: female Patient is status post Procedure(s): 
TOTAL LAPAROSCOPIC HYSTERECTOMY, BILATERAL SALPINGO OOPHORECTOMY. Surgeon(s) and Role: Ridge Arnold MD - Primary Local/Dose/Irrigation:  SEE MAR Peripheral IV 10/16/19 Right Wrist (Active) Dressing/Packing:  Wound Abdomen 3 TROCAR SITES-Dressing Type: Topical skin adhesive/glue (10/16/19 1159) Wound Vagina-Dressing Type: Angelita-pad (10/16/19 1159) Splint/Cast:  ] Other:  NA

## 2019-10-16 NOTE — ANESTHESIA POSTPROCEDURE EVALUATION
Procedure(s):  TOTAL LAPAROSCOPIC HYSTERECTOMY, BILATERAL SALPINGO OOPHORECTOMY. general    Anesthesia Post Evaluation        Patient location during evaluation: PACU  Note status: Adequate. Level of consciousness: responsive to verbal stimuli and sleepy but conscious  Pain management: satisfactory to patient  Airway patency: patent  Anesthetic complications: no  Cardiovascular status: acceptable  Respiratory status: acceptable  Hydration status: acceptable  Comments: +Post-Anesthesia Evaluation and Assessment    Patient: Jb Found MRN: [id number]  SSN: [id number]   YOB: 1965  Age: 48 y.o. Sex: female          Cardiovascular Function/Vital Signs    /53   Pulse 60   Temp 36.7 °C (98 °F)   Resp 12   Ht 5' 6.75\" (1.695 m)   Wt 60.3 kg (133 lb)   SpO2 97%   BMI 20.99 kg/m²     Patient is status post Procedure(s):  TOTAL LAPAROSCOPIC HYSTERECTOMY, BILATERAL SALPINGO OOPHORECTOMY. Nausea/Vomiting: Controlled. Postoperative hydration reviewed and adequate. Pain:  Pain Scale 1: FLACC (10/16/19 1245)  Pain Intensity 1: 0 (10/16/19 1245)   Managed. Neurological Status:   Neuro (WDL): Within Defined Limits (10/16/19 1304)   At baseline. Mental Status and Level of Consciousness: Arousable. Pulmonary Status:   O2 Device: Nasal cannula (10/16/19 1220)   Adequate oxygenation and airway patent. Complications related to anesthesia: None    Post-anesthesia assessment completed. No concerns. I have evaluated the patient and the patient is stable and ready to be discharged from PACU . Signed By: Zari Smiley MD    10/16/2019        Vitals Value Taken Time   /53 10/16/2019  1:00 PM   Temp 36.7 °C (98 °F) 10/16/2019 12:19 PM   Pulse 57 10/16/2019  1:08 PM   Resp 10 10/16/2019  1:08 PM   SpO2 95 % 10/16/2019  1:08 PM   Vitals shown include unvalidated device data.

## 2019-10-17 VITALS
RESPIRATION RATE: 16 BRPM | DIASTOLIC BLOOD PRESSURE: 63 MMHG | SYSTOLIC BLOOD PRESSURE: 102 MMHG | TEMPERATURE: 97.7 F | HEIGHT: 67 IN | OXYGEN SATURATION: 99 % | WEIGHT: 133 LBS | BODY MASS INDEX: 20.88 KG/M2 | HEART RATE: 70 BPM

## 2019-10-17 LAB
ANION GAP SERPL CALC-SCNC: 3 MMOL/L (ref 5–15)
BUN SERPL-MCNC: 10 MG/DL (ref 6–20)
BUN/CREAT SERPL: 15 (ref 12–20)
CALCIUM SERPL-MCNC: 8.2 MG/DL (ref 8.5–10.1)
CHLORIDE SERPL-SCNC: 109 MMOL/L (ref 97–108)
CO2 SERPL-SCNC: 27 MMOL/L (ref 21–32)
CREAT SERPL-MCNC: 0.65 MG/DL (ref 0.55–1.02)
ERYTHROCYTE [DISTWIDTH] IN BLOOD BY AUTOMATED COUNT: 12.8 % (ref 11.5–14.5)
GLUCOSE SERPL-MCNC: 90 MG/DL (ref 65–100)
HCT VFR BLD AUTO: 36.3 % (ref 35–47)
HGB BLD-MCNC: 11.5 G/DL (ref 11.5–16)
MCH RBC QN AUTO: 30.7 PG (ref 26–34)
MCHC RBC AUTO-ENTMCNC: 31.7 G/DL (ref 30–36.5)
MCV RBC AUTO: 96.8 FL (ref 80–99)
NRBC # BLD: 0 K/UL (ref 0–0.01)
NRBC BLD-RTO: 0 PER 100 WBC
PLATELET # BLD AUTO: 176 K/UL (ref 150–400)
PMV BLD AUTO: 10.2 FL (ref 8.9–12.9)
POTASSIUM SERPL-SCNC: 3.8 MMOL/L (ref 3.5–5.1)
RBC # BLD AUTO: 3.75 M/UL (ref 3.8–5.2)
SODIUM SERPL-SCNC: 139 MMOL/L (ref 136–145)
WBC # BLD AUTO: 7.5 K/UL (ref 3.6–11)

## 2019-10-17 PROCEDURE — 90686 IIV4 VACC NO PRSV 0.5 ML IM: CPT | Performed by: OBSTETRICS & GYNECOLOGY

## 2019-10-17 PROCEDURE — 90471 IMMUNIZATION ADMIN: CPT

## 2019-10-17 PROCEDURE — 85027 COMPLETE CBC AUTOMATED: CPT

## 2019-10-17 PROCEDURE — 74011000258 HC RX REV CODE- 258: Performed by: OBSTETRICS & GYNECOLOGY

## 2019-10-17 PROCEDURE — 74011250637 HC RX REV CODE- 250/637: Performed by: OBSTETRICS & GYNECOLOGY

## 2019-10-17 PROCEDURE — 36415 COLL VENOUS BLD VENIPUNCTURE: CPT

## 2019-10-17 PROCEDURE — 99218 HC RM OBSERVATION: CPT

## 2019-10-17 PROCEDURE — 80048 BASIC METABOLIC PNL TOTAL CA: CPT

## 2019-10-17 PROCEDURE — 74011250636 HC RX REV CODE- 250/636: Performed by: OBSTETRICS & GYNECOLOGY

## 2019-10-17 RX ORDER — OXYCODONE HYDROCHLORIDE 5 MG/1
5 TABLET ORAL
Qty: 20 TAB | Refills: 0 | Status: SHIPPED | OUTPATIENT
Start: 2019-10-17 | End: 2019-10-24

## 2019-10-17 RX ADMIN — PANTOPRAZOLE SODIUM 40 MG: 40 TABLET, DELAYED RELEASE ORAL at 09:20

## 2019-10-17 RX ADMIN — KETOROLAC TROMETHAMINE 30 MG: 30 INJECTION, SOLUTION INTRAMUSCULAR at 06:35

## 2019-10-17 RX ADMIN — ACETAMINOPHEN 650 MG: 325 TABLET, FILM COATED ORAL at 09:20

## 2019-10-17 RX ADMIN — Medication 10 ML: at 06:35

## 2019-10-17 RX ADMIN — CITALOPRAM HYDROBROMIDE 20 MG: 20 TABLET ORAL at 09:20

## 2019-10-17 RX ADMIN — ACETAMINOPHEN 650 MG: 325 TABLET, FILM COATED ORAL at 01:05

## 2019-10-17 RX ADMIN — CEFAZOLIN 1 G: 1 INJECTION, POWDER, FOR SOLUTION INTRAMUSCULAR; INTRAVENOUS at 06:36

## 2019-10-17 RX ADMIN — KETOROLAC TROMETHAMINE 30 MG: 30 INJECTION, SOLUTION INTRAMUSCULAR at 01:05

## 2019-10-17 RX ADMIN — INFLUENZA VIRUS VACCINE 0.5 ML: 15; 15; 15; 15 SUSPENSION INTRAMUSCULAR at 10:57

## 2019-10-17 NOTE — DISCHARGE INSTRUCTIONS
[facility] [address], [address] [phone]  F [phone]      Patient Discharge Instructions    Daniel Carpenter / [id number] : 1965    Admitted 10/16/2019 Discharged: 10/17/2019     Take Home Medications     No current facility-administered medications on file prior to encounter. Current Outpatient Medications on File Prior to Encounter   Medication Sig Dispense Refill    CALCIUM PO Take 1,200 mg by mouth daily.  cholecalciferol, vitamin D3, (VITAMIN D3 PO) Take 50 mcg by mouth.  citalopram (CELEXA) 20 mg tablet Take 1 Tab by mouth daily. (Patient taking differently: Take 10 mg by mouth daily.) 90 Tab 1    estradiol (VAGIFEM) 10 mcg tab vaginal tablet Insert 10 mcg into vagina every Monday and Thursday.  clonazePAM (KLONOPIN) 0.5 mg tablet Take 1 Tab by mouth nightly as needed (anxiety). Max Daily Amount: 0.5 mg. 20 Tab 0    valACYclovir (VALTREX) 500 mg tablet Take 500 mg by mouth as needed. · It is important that you take the medication exactly as they are prescribed. · Keep your medication in the bottles provided by the pharmacist and keep a list of the medication names, dosages, and times to be taken in your wallet. · Do not take other medications without consulting your doctor. What to do at Home    Recommended diet: Regular Diet, stay hydrated. You should take a stool softener such as colace for constipation. If constipation persists for >24 hours you should take a dose of Milk of Magnesia. Call if your constipation persists. If you have had a hysterectomy or vaginal surgery you may experience vaginal spotting. This is acceptable. Should the bleeding require more that 4 pads a day please call our office. Nothing per vagina for 6-8 weeks.     Recommended activity:   No driving for 1 week and/or while on pain medication  Walk at least 6 times per day  Showers okay, no tub bathing/swimming for two weeks  Activity as able, stairs okay. If you had a laparoscopic procedure, no lifting greater than 25 lbs for 3 weeks. If you had an open procedure, no heavy lifting greater than 15 lbs for 6 weeks. If you experience any of the following persisting symptoms:    Nausea/vomiting, fever > 101 F, diarrhea/constipation, increasing pain despite medication, increasing vaginal discharge or any concerns, please call our office. Follow-up with Dr. Rhonda Mariee in 4 weeks. Call [phone] for an appointment. Information obtained by :  I understand that if any problems occur once I am at home I am to contact my physician. I understand and acknowledge receipt of the instructions indicated above.                                                                                                                                            Physician's or R.N.'s Signature                                                                  Date/Time                                                                                                                                              Patient or Representative Signature                                                          Date/Time

## 2019-10-17 NOTE — PROGRESS NOTES
Bedside shift change report given to CICI Eddy RN (oncoming nurse) by Pepe Proctor. Deb Serra RN (offgoing nurse). Report included the following information SBAR, Kardex, OR Summary, Procedure Summary, Intake/Output and MAR. I have reviewed discharge instructions with the patient. The patient verbalized understanding, and has no further questions at this time.

## 2019-10-17 NOTE — PROGRESS NOTES
[facility] GYNECOLOGIC ONCOLOGY  [address] [place] [address], [address], [address]  P [phone]  F [phone]       Patient Name: Key House   Admit Date: 10/16/2019   OR Date: 10/16/2019   Visit Date: 10/17/2019        SUBJECTIVE:    No complaints this morning. Minimal pain. No nausea. OBJECTIVE:    Patient Vitals for the past 24 hrs:   Temp Pulse Resp BP SpO2   10/17/19 0453 97.9 °F (36.6 °C) 64 16 103/66 98 %   10/17/19 0006 97.9 °F (36.6 °C) 65 16 109/57 97 %   10/16/19 2010 97.7 °F (36.5 °C) (!) 59 16 108/64 96 %   10/16/19 1909 97.7 °F (36.5 °C) 60 16 105/68 97 %   10/16/19 1810 97.3 °F (36.3 °C) 66 16 112/62 97 %   10/16/19 1709 98 °F (36.7 °C) 84 16 101/76 98 %   10/16/19 1600 98.5 °F (36.9 °C) 82 18 119/71 98 %   10/16/19 1545  71 13 111/56 95 %   10/16/19 1530  74 12 96/62 96 %   10/16/19 1515  64 12 105/58 96 %   10/16/19 1500  63 10 107/49 94 %   10/16/19 1445  (!) 59 10 106/57 95 %   10/16/19 1430  66  114/58 95 %   10/16/19 1415  64 12 101/55 97 %   10/16/19 1400  64 11 102/61 96 %   10/16/19 1345  (!) 55 11 99/60 94 %   10/16/19 1330  (!) 57 10 97/56 94 %   10/16/19 1315  (!) 57 10 96/52 94 %   10/16/19 1300  60 12 101/53    10/16/19 1245  (!) 59 12 100/50    10/16/19 1230  (!) 55 11 100/53 97 %   10/16/19 1225  60 12 96/56 94 %   10/16/19 1220  62 17 101/53 97 %   10/16/19 1219 98 °F (36.7 °C) (!) 55 12 100/58 100 %   10/16/19 0910 98.1 °F (36.7 °C) 64 17 141/80 100 %       Date 10/16/19 0700 - 10/17/19 0659 10/17/19 0700 - 10/18/19 0659   Shift 7988-0860 1187-9724 24 Hour Total 2535-3137 1377-2807 24 Hour Total   INTAKE   P.O. 240  240        P. O. 240  240      I. V.(mL/kg/hr) 300(0.4)  300(0.2)        I.V. 300  300      Shift Total(mL/kg) 540(9)  540(9)      OUTPUT   Urine(mL/kg/hr) 850(1.2) 2100(2.9) 6807(0)        Urine Output 250  250        Urine Output (mL) ([REMOVED] Urinary Catheter 10/16/19 2- way) 600 2100 2700      Shift Total(mL/kg) 850(14.1) 5153(51.0) 6910(94.5)      NET -895 -6635 -1500      Weight (kg) 60.3 60.3 60.3 60.3 60.3 60.3       Physical Exam     General:  alert, cooperative, no distress     Cardiac:  Regular rate and rhythm        Lungs:  clear to auscultation bilaterally  Abdomen:  soft, non-distended, appropriately tender       Wound:  clean, dry, no drainage   Extremity: extremities normal, atraumatic, no cyanosis or edema    Data Review  Lab Results   Component Value Date/Time    WBC 7.5 10/17/2019 05:03 AM    ABS. NEUTROPHILS 2.5 10/09/2019 11:40 AM    HGB 11.5 10/17/2019 05:03 AM    HCT 36.3 10/17/2019 05:03 AM    MCV 96.8 10/17/2019 05:03 AM    MCH 30.7 10/17/2019 05:03 AM    PLATELET 389 34/12/8232 05:03 AM     Lab Results   Component Value Date/Time    Sodium 139 10/17/2019 05:03 AM    Potassium 3.8 10/17/2019 05:03 AM    Chloride 109 (H) 10/17/2019 05:03 AM    CO2 27 10/17/2019 05:03 AM    Glucose 90 10/17/2019 05:03 AM    BUN 10 10/17/2019 05:03 AM    Creatinine 0.65 10/17/2019 05:03 AM    Calcium 8.2 (L) 10/17/2019 05:03 AM    Albumin 4.1 10/09/2019 11:40 AM    Bilirubin, total 0.4 10/09/2019 11:40 AM    AST (SGOT) 8 (L) 10/09/2019 11:40 AM    ALT (SGPT) 12 10/09/2019 11:40 AM    Alk. phosphatase 62 10/09/2019 11:40 AM     IMPRESSION/PLAN:    1 Day Post-Op Procedure(s):  TOTAL LAPAROSCOPIC HYSTERECTOMY, BILATERAL SALPINGO OOPHORECTOMY for PELVIC CONGESTION    Gynecologic:  Pelvic congestion. No evidence of malignancy noted. Heme/CV:  Hemodynamically stable. Appropriate Hgb. Renal:  Good UOP. Normal creatinine. FEN/GI:  Diet as tolerated. ID/Wound:  Afebrile. Normal WBC. PPX:  Ambulation, SCDs, IS. Dispostion:  Doing well postoperatively. Will d/c home today.         Ladan Dai MD

## 2019-10-30 ENCOUNTER — OFFICE VISIT (OUTPATIENT)
Dept: FAMILY MEDICINE CLINIC | Age: 54
End: 2019-10-30

## 2019-10-30 ENCOUNTER — TELEPHONE (OUTPATIENT)
Dept: GYNECOLOGY | Age: 54
End: 2019-10-30

## 2019-10-30 VITALS
WEIGHT: 131.8 LBS | TEMPERATURE: 98.7 F | HEIGHT: 67 IN | DIASTOLIC BLOOD PRESSURE: 68 MMHG | SYSTOLIC BLOOD PRESSURE: 116 MMHG | RESPIRATION RATE: 20 BRPM | BODY MASS INDEX: 20.69 KG/M2 | HEART RATE: 68 BPM | OXYGEN SATURATION: 98 %

## 2019-10-30 DIAGNOSIS — R42 LIGHTHEADEDNESS: Primary | ICD-10-CM

## 2019-10-30 DIAGNOSIS — R42 DIZZINESS: ICD-10-CM

## 2019-10-30 NOTE — PROGRESS NOTES
Chief Complaint:  Chief Complaint   Patient presents with    Follow Up Chronic Condition     Follow up for continued headaches and lightheadedness       History of Present Illness:  54F who presents for follow up after recent hysterectomy that was done due to Pelvic congestion syndrome. She still has onset of lightheadedness and headaches that come and go. Her surgery was done about 2-weeks ago. All other previous work up was negative. There had been a concern about TIA, but head CT and MRI done about 3-years ago for similar symptoms are normal.   Of note, she will see Dr. Rebecca Vicente 11/5/2019, but available pathology was normal. I discussed that he would finalize the results at her visit. It is not clear what is driving her symptoms and we discussed the possibility of neurological evaluation. Will send for CBC-D and CMP  today and will advise when the results return. Reviewed PmHx, RxHx, FmHx, SocHx, AllgHx and updated and dated in the chart.     Patient Active Problem List    Diagnosis    S/P hysterectomy    Pelvic congestion syndrome    Burning sensation of feet    TIA (transient ischemic attack)    Paresthesia of left arm    Chronic neck pain    Anxiety    Injury of elbow, left    Swelling of joint, elbow, left    GERD (gastroesophageal reflux disease)    Unspecified hypothyroidism    Panic disorder without agoraphobia     Review of Systems - negative except as listed above in the HPI    Objective:     Vitals:    10/30/19 1138   BP: 116/68   Pulse: 68   Resp: 20   Temp: 98.7 °F (37.1 °C)   TempSrc: Oral   SpO2: 98%   Weight: 131 lb 12.8 oz (59.8 kg)   Height: 5' 6.75\" (1.695 m)     Physical Examination:   General appearance - alert, well appearing, and in no distress  Mental status - alert, oriented to person, place, and time  Chest - clear to auscultation, no wheezes, rales or rhonchi, symmetric air entry  Heart - normal rate, regular rhythm, normal S1, S2, no murmurs, rubs, clicks or gallops  Neurological - alert, oriented, normal speech, no focal findings or movement disorder noted  Musculoskeletal - no joint tenderness, deformity or swelling  Extremities - peripheral pulses normal, no pedal edema, no clubbing or cyanosis    Assessment/ Plan:     Diagnoses and all orders for this visit:    1. Lightheadedness  -     CBC W/O DIFF  -     METABOLIC PANEL, BASIC    2. Dizziness  -     CBC W/O DIFF  -     METABOLIC PANEL, BASIC    I have discussed the diagnosis with the patient and the intended treatment plan as seen in the above orders. The patient has received an after-visit summary and questions were answered concerning future plans. Asked to return should symptoms worsen or not improve with treatment. Any pending labs and studies will be relayed to patient when they become available. Will also consider neurology evaluation as well. Pt verbalizes understanding of plan of care and denies further questions or concerns at this time. Follow-up and Dispositions    · Return if symptoms worsen or fail to improve.          Michoacano Siddiqui MD

## 2019-10-31 LAB
BUN SERPL-MCNC: 9 MG/DL (ref 6–24)
BUN/CREAT SERPL: 16 (ref 9–23)
CALCIUM SERPL-MCNC: 9.2 MG/DL (ref 8.7–10.2)
CHLORIDE SERPL-SCNC: 102 MMOL/L (ref 96–106)
CO2 SERPL-SCNC: 23 MMOL/L (ref 20–29)
CREAT SERPL-MCNC: 0.55 MG/DL (ref 0.57–1)
ERYTHROCYTE [DISTWIDTH] IN BLOOD BY AUTOMATED COUNT: 12.2 % (ref 12.3–15.4)
GLUCOSE SERPL-MCNC: 82 MG/DL (ref 65–99)
HCT VFR BLD AUTO: 39.7 % (ref 34–46.6)
HGB BLD-MCNC: 13.1 G/DL (ref 11.1–15.9)
MCH RBC QN AUTO: 30.1 PG (ref 26.6–33)
MCHC RBC AUTO-ENTMCNC: 33 G/DL (ref 31.5–35.7)
MCV RBC AUTO: 91 FL (ref 79–97)
PLATELET # BLD AUTO: 282 X10E3/UL (ref 150–450)
POTASSIUM SERPL-SCNC: 4.4 MMOL/L (ref 3.5–5.2)
RBC # BLD AUTO: 4.35 X10E6/UL (ref 3.77–5.28)
SODIUM SERPL-SCNC: 141 MMOL/L (ref 134–144)
WBC # BLD AUTO: 6.7 X10E3/UL (ref 3.4–10.8)

## 2019-10-31 NOTE — TELEPHONE ENCOUNTER
Late entry, I spoke with patient on the afternoon of 10/30/19, pt complains of right sided pelvic pain that started on 10/29/19 but has progressively gotten worse today 10/30/19, the pain radiates to her back,  she states it is a 9/10 on the pain scale, she states the pain is sharp and constant, negative for fever and vomiting, negative for blood in her urine, she states she has had nausea and diarrhea today. She states she has taken extra strength tylenol and it did not help the pain. I spoke with Angelina Tucker and he states to have her watch it through the evening and take pain medication that was given to her at time of discharge, if no improvement we can see her tomorrow 10/31/19, either Dr. Noemi Early or Angelina Tucker states he will see her in the afternoon.   Pt verbalizes understanding

## 2019-11-05 ENCOUNTER — OFFICE VISIT (OUTPATIENT)
Dept: GYNECOLOGY | Age: 54
End: 2019-11-05

## 2019-11-05 VITALS
WEIGHT: 135.6 LBS | HEIGHT: 67 IN | SYSTOLIC BLOOD PRESSURE: 106 MMHG | DIASTOLIC BLOOD PRESSURE: 77 MMHG | BODY MASS INDEX: 21.28 KG/M2 | HEART RATE: 64 BPM

## 2019-11-05 DIAGNOSIS — N94.89 PELVIC CONGESTION SYNDROME: Primary | ICD-10-CM

## 2019-11-05 NOTE — PROGRESS NOTES
[facility] [address], [address]  P [phone]  F [phone]    Postoperative Office Note  Patient ID:  Name: Alex Dempsey  MRM: [id number]  : 1965/54 y.o. Date: 2019    Diagnosis:     ICD-10-CM ICD-9-CM    1. Pelvic congestion syndrome N94.89 625.5        Problem List:   Patient Active Problem List   Diagnosis Code    Panic disorder without agoraphobia F41.0    Unspecified hypothyroidism E03.9    GERD (gastroesophageal reflux disease) K21.9    Injury of elbow, left S59.902A    Swelling of joint, elbow, left M25.422    Anxiety F41.9    TIA (transient ischemic attack) G45.9    Paresthesia of left arm R20.2    Chronic neck pain M54.2, G89.29    Burning sensation of feet R20.8    Pelvic congestion syndrome N94.89    S/P hysterectomy Z90.710       SUBJECTIVE:    Alex Dempsey is a 47 y.o. female who presents for followup postoperative care following TLH, BSO. The patient's pathology revealed: FINAL PATHOLOGIC DIAGNOSIS   Uterus, cervix, fallopian tubes and ovaries, hysterectomy and bilateral salpingo-oophorectomy (54 g):   Cervix:   Unremarkable ectocervix and endocervix   Endometrium:   Inactive endometrium   Myometrium:   Prominent dilated vessels in an otherwise unremarkable myometrium   Bilateral fallopian tubes:   Bilateral unremarkable fallopian tubes   Bilateral ovaries:   Bilateral ovaries with physiologic changes       Currently she has no problems with eating, bowel movements, or voiding. Appetite is good. Eating a regular diet without difficulty. Bowel movements are regular. The patient is not having any pain. Medications:     Current Outpatient Medications on File Prior to Visit   Medication Sig Dispense Refill    fluticasone propionate (FLONASE ALLERGY RELIEF) 50 mcg/actuation nasal spray 2 Sprays by Both Nostrils route daily.  Biotin 2,500 mcg cap Take 2,500 mcg by mouth.       pantoprazole (PROTONIX) 40 mg tablet Take 40 mg by mouth daily.  CALCIUM PO Take 1,200 mg by mouth daily.  cholecalciferol, vitamin D3, (VITAMIN D3 PO) Take 50 mcg by mouth.  estradiol (VAGIFEM) 10 mcg tab vaginal tablet Insert 10 mcg into vagina every Monday and Thursday.  valACYclovir (VALTREX) 500 mg tablet Take 500 mg by mouth as needed.  citalopram (CELEXA) 20 mg tablet Take 1 Tab by mouth daily. (Patient taking differently: Take 10 mg by mouth daily.) 90 Tab 1    clonazePAM (KLONOPIN) 0.5 mg tablet Take 1 Tab by mouth nightly as needed (anxiety). Max Daily Amount: 0.5 mg. 20 Tab 0     No current facility-administered medications on file prior to visit. Allergies: Allergies   Allergen Reactions    Naproxen Other (comments)     Abdominal Pain    Other Medication Other (comments)     Z-PACK: FELT WEAK FOR ONE WEEK AFTER TAKING AND VERY LIGHT HEADED.      Past Medical History:   Diagnosis Date    Anxiety     Arthritis     Cancer ([facility] [address]     BASAL CELL SKIN CANCER ON CHEST    Chronic neck pain 6/27/2015    Fatigue     GERD (gastroesophageal reflux disease) 3/12/2014    Headache     Hiatal hernia     Panic disorder     PUD (peptic ulcer disease)     TIA (transient ischemic attack) 6/27/2015     Past Surgical History:   Procedure Laterality Date    HX GYN      BTL    HX HEENT      HX WISDOM TEETH EXTRACTION       Social History     Socioeconomic History    Marital status:      Spouse name: Not on file    Number of children: Not on file    Years of education: Not on file    Highest education level: Not on file   Occupational History    Not on file   Social Needs    Financial resource strain: Not on file    Food insecurity:     Worry: Not on file     Inability: Not on file    Transportation needs:     Medical: Not on file     Non-medical: Not on file   Tobacco Use    Smoking status: Never Smoker    Smokeless tobacco: Never Used   Substance and Sexual Activity    Alcohol use: Yes     Alcohol/week: 2.0 standard drinks     Types: 2 Glasses of wine per week     Comment: occ    Drug use: No    Sexual activity: Yes     Partners: Male     Birth control/protection: Surgical   Lifestyle    Physical activity:     Days per week: Not on file     Minutes per session: Not on file    Stress: Not on file   Relationships    Social connections:     Talks on phone: Not on file     Gets together: Not on file     Attends [employer] service: Not on file     Active member of club or organization: Not on file     Attends meetings of clubs or organizations: Not on file     Relationship status: Not on file    Intimate partner violence:     Fear of current or ex partner: Not on file     Emotionally abused: Not on file     Physically abused: Not on file     Forced sexual activity: Not on file   Other Topics Concern     Service No    Blood Transfusions No    Caffeine Concern No    Occupational Exposure No    Hobby Hazards No    Sleep Concern Yes    Stress Concern Yes    Weight Concern Yes    Special Diet No    Back Care No    Exercise Yes    Bike Helmet No    Seat Belt Yes    Self-Exams Not Asked   Social History Narrative    Not on file       OBJECTIVE:    Vitals:   Vitals:    11/05/19 1057   BP: 106/77   Pulse: 64   Weight: 135 lb 9.6 oz (61.5 kg)   Height: 5' 6.73\" (1.695 m)     Physical Examination:     General:  alert, cooperative, no distress   Lungs: lungs clear to auscultation   Cardiac: Regular rate and rhythm   Abdomen: soft, bowel sounds active, non-tender  No CVA tenderness   Incision: healing well   Pelvic: External genitalia: normal general appearance  Urinary system: urethral meatus normal  Vaginal: normal without tenderness, induration or masses  Cervix: absent  Adnexa: removed surgically  Uterus: absent   Rectal: not done   Extremity:   extremities normal, atraumatic, no cyanosis or edema     IMPRESSION:    Merlene Gutierrez is doing well postoperatively.   She has no apparent complications from surgery. Medical problems:     Patient Active Problem List   Diagnosis Code    Panic disorder without agoraphobia F41.0    Unspecified hypothyroidism E03.9    GERD (gastroesophageal reflux disease) K21.9    Injury of elbow, left S59.902A    Swelling of joint, elbow, left M25.422    Anxiety F41.9    TIA (transient ischemic attack) G45.9    Paresthesia of left arm R20.2    Chronic neck pain M54.2, G89.29    Burning sensation of feet R20.8    Pelvic congestion syndrome N94.89    S/P hysterectomy Z90.710       PLAN:    The operative procedures and clinical results have been reviewed with the patient. Implications of diagnosis discussed at length. All questions answered. The patient may return to work. I will see the patient back prn. The patient is advised to call our office with any problems or concerns.     Elia Ballesteros MD  11/5/2019/11:21 AM

## 2019-11-05 NOTE — LETTER
NOTIFICATION OF RETURN TO WORK 
 
11/5/2019 11:33 AM 
 
Ms. Vogt Born [address] [facility] [id number] Billielawson Burris To Whom It May Concern: 
 
Sin Born was under the care of Umberto Barry. She was out of work from 10/15/19 through 11/5/19. She will be able to return to work on 11/6/19 with no restrictions. If there are questions or concerns please have the patient contact our office.  
 
Sincerely, 
 
 
Ladan Dai MD

## 2019-12-07 ENCOUNTER — HOSPITAL ENCOUNTER (EMERGENCY)
Age: 54
Discharge: OTHER INSTITUTION | End: 2019-12-08
Attending: EMERGENCY MEDICINE
Payer: COMMERCIAL

## 2019-12-07 ENCOUNTER — APPOINTMENT (OUTPATIENT)
Dept: ULTRASOUND IMAGING | Age: 54
End: 2019-12-07
Attending: EMERGENCY MEDICINE
Payer: COMMERCIAL

## 2019-12-07 DIAGNOSIS — N93.9 VAGINAL BLEEDING: Primary | ICD-10-CM

## 2019-12-07 DIAGNOSIS — Z90.710 S/P HYSTERECTOMY: ICD-10-CM

## 2019-12-07 DIAGNOSIS — T81.31XA DEHISCENCE OF VAGINAL CUFF, INITIAL ENCOUNTER: ICD-10-CM

## 2019-12-07 LAB
ALBUMIN SERPL-MCNC: 4 G/DL (ref 3.5–5)
ALBUMIN/GLOB SERPL: 1.3 {RATIO} (ref 1.1–2.2)
ALP SERPL-CCNC: 78 U/L (ref 45–117)
ALT SERPL-CCNC: 19 U/L (ref 12–78)
ANION GAP SERPL CALC-SCNC: 5 MMOL/L (ref 5–15)
APTT PPP: 23.9 SEC (ref 22.1–32)
AST SERPL-CCNC: 10 U/L (ref 15–37)
BASOPHILS # BLD: 0 K/UL (ref 0–0.1)
BASOPHILS NFR BLD: 0 % (ref 0–1)
BILIRUB SERPL-MCNC: 0.2 MG/DL (ref 0.2–1)
BUN SERPL-MCNC: 12 MG/DL (ref 6–20)
BUN/CREAT SERPL: 14 (ref 12–20)
CALCIUM SERPL-MCNC: 8.3 MG/DL (ref 8.5–10.1)
CHLORIDE SERPL-SCNC: 106 MMOL/L (ref 97–108)
CO2 SERPL-SCNC: 30 MMOL/L (ref 21–32)
COMMENT, HOLDF: NORMAL
COMMENT, HOLDF: NORMAL
CREAT SERPL-MCNC: 0.88 MG/DL (ref 0.55–1.02)
DIFFERENTIAL METHOD BLD: ABNORMAL
EOSINOPHIL # BLD: 0.2 K/UL (ref 0–0.4)
EOSINOPHIL NFR BLD: 2 % (ref 0–7)
ERYTHROCYTE [DISTWIDTH] IN BLOOD BY AUTOMATED COUNT: 12.8 % (ref 11.5–14.5)
GLOBULIN SER CALC-MCNC: 3 G/DL (ref 2–4)
GLUCOSE SERPL-MCNC: 92 MG/DL (ref 65–100)
HCT VFR BLD AUTO: 40 % (ref 35–47)
HGB BLD-MCNC: 13.2 G/DL (ref 11.5–16)
IMM GRANULOCYTES # BLD AUTO: 0 K/UL (ref 0–0.04)
IMM GRANULOCYTES NFR BLD AUTO: 0 % (ref 0–0.5)
INR PPP: 1 (ref 0.9–1.1)
LYMPHOCYTES # BLD: 1.6 K/UL (ref 0.8–3.5)
LYMPHOCYTES NFR BLD: 17 % (ref 12–49)
MCH RBC QN AUTO: 31.4 PG (ref 26–34)
MCHC RBC AUTO-ENTMCNC: 33 G/DL (ref 30–36.5)
MCV RBC AUTO: 95.2 FL (ref 80–99)
MONOCYTES # BLD: 0.3 K/UL (ref 0–1)
MONOCYTES NFR BLD: 3 % (ref 5–13)
NEUTS SEG # BLD: 7.2 K/UL (ref 1.8–8)
NEUTS SEG NFR BLD: 78 % (ref 32–75)
NRBC # BLD: 0 K/UL (ref 0–0.01)
NRBC BLD-RTO: 0 PER 100 WBC
PLATELET # BLD AUTO: 249 K/UL (ref 150–400)
PMV BLD AUTO: 9.6 FL (ref 8.9–12.9)
POTASSIUM SERPL-SCNC: 3.5 MMOL/L (ref 3.5–5.1)
PROT SERPL-MCNC: 7 G/DL (ref 6.4–8.2)
PROTHROMBIN TIME: 10.3 SEC (ref 9–11.1)
RBC # BLD AUTO: 4.2 M/UL (ref 3.8–5.2)
SAMPLES BEING HELD,HOLD: NORMAL
SAMPLES BEING HELD,HOLD: NORMAL
SODIUM SERPL-SCNC: 141 MMOL/L (ref 136–145)
THERAPEUTIC RANGE,PTTT: NORMAL SECS (ref 58–77)
WBC # BLD AUTO: 9.3 K/UL (ref 3.6–11)

## 2019-12-07 PROCEDURE — 74011250636 HC RX REV CODE- 250/636: Performed by: OBSTETRICS & GYNECOLOGY

## 2019-12-07 PROCEDURE — 80053 COMPREHEN METABOLIC PANEL: CPT

## 2019-12-07 PROCEDURE — 74011250636 HC RX REV CODE- 250/636: Performed by: EMERGENCY MEDICINE

## 2019-12-07 PROCEDURE — 85610 PROTHROMBIN TIME: CPT

## 2019-12-07 PROCEDURE — 76856 US EXAM PELVIC COMPLETE: CPT

## 2019-12-07 PROCEDURE — 36415 COLL VENOUS BLD VENIPUNCTURE: CPT

## 2019-12-07 PROCEDURE — 74011000258 HC RX REV CODE- 258: Performed by: OBSTETRICS & GYNECOLOGY

## 2019-12-07 PROCEDURE — 85025 COMPLETE CBC W/AUTO DIFF WBC: CPT

## 2019-12-07 PROCEDURE — 96365 THER/PROPH/DIAG IV INF INIT: CPT

## 2019-12-07 PROCEDURE — 99285 EMERGENCY DEPT VISIT HI MDM: CPT

## 2019-12-07 PROCEDURE — 85730 THROMBOPLASTIN TIME PARTIAL: CPT

## 2019-12-07 RX ADMIN — SODIUM CHLORIDE 1000 ML: 900 INJECTION, SOLUTION INTRAVENOUS at 20:35

## 2019-12-07 RX ADMIN — PIPERACILLIN SODIUM,TAZOBACTAM SODIUM 3.38 G: 3; .375 INJECTION, POWDER, FOR SOLUTION INTRAVENOUS at 23:26

## 2019-12-08 ENCOUNTER — ANESTHESIA EVENT (OUTPATIENT)
Dept: SURGERY | Age: 54
End: 2019-12-08
Payer: COMMERCIAL

## 2019-12-08 ENCOUNTER — ANESTHESIA (OUTPATIENT)
Dept: SURGERY | Age: 54
End: 2019-12-08
Payer: COMMERCIAL

## 2019-12-08 ENCOUNTER — HOSPITAL ENCOUNTER (OUTPATIENT)
Age: 54
Setting detail: OBSERVATION
Discharge: HOME | End: 2019-12-08
Attending: OBSTETRICS & GYNECOLOGY | Admitting: OBSTETRICS & GYNECOLOGY
Payer: COMMERCIAL

## 2019-12-08 VITALS
HEART RATE: 85 BPM | RESPIRATION RATE: 20 BRPM | SYSTOLIC BLOOD PRESSURE: 123 MMHG | BODY MASS INDEX: 21.38 KG/M2 | TEMPERATURE: 99.6 F | OXYGEN SATURATION: 97 % | DIASTOLIC BLOOD PRESSURE: 68 MMHG | WEIGHT: 133 LBS | HEIGHT: 66 IN

## 2019-12-08 VITALS
SYSTOLIC BLOOD PRESSURE: 96 MMHG | RESPIRATION RATE: 16 BRPM | HEART RATE: 68 BPM | DIASTOLIC BLOOD PRESSURE: 65 MMHG | TEMPERATURE: 98.4 F | OXYGEN SATURATION: 97 %

## 2019-12-08 PROBLEM — T81.31XA VAGINAL CUFF DEHISCENCE: Status: ACTIVE | Noted: 2019-12-08

## 2019-12-08 LAB
ANION GAP SERPL CALC-SCNC: 4 MMOL/L (ref 5–15)
BUN SERPL-MCNC: 10 MG/DL (ref 6–20)
BUN/CREAT SERPL: 14 (ref 12–20)
CALCIUM SERPL-MCNC: 8.7 MG/DL (ref 8.5–10.1)
CHLORIDE SERPL-SCNC: 108 MMOL/L (ref 97–108)
CO2 SERPL-SCNC: 27 MMOL/L (ref 21–32)
CREAT SERPL-MCNC: 0.69 MG/DL (ref 0.55–1.02)
ERYTHROCYTE [DISTWIDTH] IN BLOOD BY AUTOMATED COUNT: 12.7 % (ref 11.5–14.5)
GLUCOSE SERPL-MCNC: 156 MG/DL (ref 65–100)
HCT VFR BLD AUTO: 36.6 % (ref 35–47)
HGB BLD-MCNC: 11.8 G/DL (ref 11.5–16)
MCH RBC QN AUTO: 30.6 PG (ref 26–34)
MCHC RBC AUTO-ENTMCNC: 32.2 G/DL (ref 30–36.5)
MCV RBC AUTO: 95.1 FL (ref 80–99)
NRBC # BLD: 0 K/UL (ref 0–0.01)
NRBC BLD-RTO: 0 PER 100 WBC
PLATELET # BLD AUTO: 212 K/UL (ref 150–400)
PMV BLD AUTO: 9.8 FL (ref 8.9–12.9)
POTASSIUM SERPL-SCNC: 3.8 MMOL/L (ref 3.5–5.1)
RBC # BLD AUTO: 3.85 M/UL (ref 3.8–5.2)
SODIUM SERPL-SCNC: 139 MMOL/L (ref 136–145)
WBC # BLD AUTO: 11.7 K/UL (ref 3.6–11)

## 2019-12-08 PROCEDURE — 99218 HC RM OBSERVATION: CPT

## 2019-12-08 PROCEDURE — 85027 COMPLETE CBC AUTOMATED: CPT

## 2019-12-08 PROCEDURE — 80048 BASIC METABOLIC PNL TOTAL CA: CPT

## 2019-12-08 PROCEDURE — 74011250636 HC RX REV CODE- 250/636: Performed by: ANESTHESIOLOGY

## 2019-12-08 PROCEDURE — 76060000033 HC ANESTHESIA 1 TO 1.5 HR: Performed by: OBSTETRICS & GYNECOLOGY

## 2019-12-08 PROCEDURE — 36415 COLL VENOUS BLD VENIPUNCTURE: CPT

## 2019-12-08 PROCEDURE — 77030011640 HC PAD GRND REM COVD -A: Performed by: OBSTETRICS & GYNECOLOGY

## 2019-12-08 PROCEDURE — 77030013079 HC BLNKT BAIR HGGR 3M -A: Performed by: ANESTHESIOLOGY

## 2019-12-08 PROCEDURE — 74011000250 HC RX REV CODE- 250: Performed by: NURSE ANESTHETIST, CERTIFIED REGISTERED

## 2019-12-08 PROCEDURE — 76210000063 HC OR PH I REC FIRST 0.5 HR: Performed by: OBSTETRICS & GYNECOLOGY

## 2019-12-08 PROCEDURE — 74011250636 HC RX REV CODE- 250/636: Performed by: OBSTETRICS & GYNECOLOGY

## 2019-12-08 PROCEDURE — 76010000149 HC OR TIME 1 TO 1.5 HR: Performed by: OBSTETRICS & GYNECOLOGY

## 2019-12-08 PROCEDURE — 77030026438 HC STYL ET INTUB CARD -A: Performed by: ANESTHESIOLOGY

## 2019-12-08 PROCEDURE — 74011250636 HC RX REV CODE- 250/636: Performed by: NURSE ANESTHETIST, CERTIFIED REGISTERED

## 2019-12-08 PROCEDURE — 77030008684 HC TU ET CUF COVD -B: Performed by: ANESTHESIOLOGY

## 2019-12-08 PROCEDURE — 74011250637 HC RX REV CODE- 250/637: Performed by: OBSTETRICS & GYNECOLOGY

## 2019-12-08 PROCEDURE — 77030031139 HC SUT VCRL2 J&J -A: Performed by: OBSTETRICS & GYNECOLOGY

## 2019-12-08 PROCEDURE — 77030040361 HC SLV COMPR DVT MDII -B: Performed by: OBSTETRICS & GYNECOLOGY

## 2019-12-08 RX ORDER — CEFAZOLIN SODIUM/WATER 2 G/20 ML
2 SYRINGE (ML) INTRAVENOUS EVERY 8 HOURS
Status: DISCONTINUED | OUTPATIENT
Start: 2019-12-08 | End: 2019-12-08 | Stop reason: HOSPADM

## 2019-12-08 RX ORDER — MORPHINE SULFATE 10 MG/ML
2 INJECTION, SOLUTION INTRAMUSCULAR; INTRAVENOUS
Status: DISCONTINUED | OUTPATIENT
Start: 2019-12-08 | End: 2019-12-08 | Stop reason: HOSPADM

## 2019-12-08 RX ORDER — AMOXICILLIN AND CLAVULANATE POTASSIUM 875; 125 MG/1; MG/1
1 TABLET, FILM COATED ORAL EVERY 12 HOURS
Qty: 10 TAB | Refills: 0 | Status: SHIPPED | OUTPATIENT
Start: 2019-12-08 | End: 2019-12-13

## 2019-12-08 RX ORDER — SODIUM CHLORIDE 9 MG/ML
125 INJECTION, SOLUTION INTRAVENOUS CONTINUOUS
Status: DISCONTINUED | OUTPATIENT
Start: 2019-12-08 | End: 2019-12-08

## 2019-12-08 RX ORDER — GLYCOPYRROLATE 0.2 MG/ML
0.2 INJECTION INTRAMUSCULAR; INTRAVENOUS
Status: DISCONTINUED | OUTPATIENT
Start: 2019-12-08 | End: 2019-12-08 | Stop reason: HOSPADM

## 2019-12-08 RX ORDER — HYDROMORPHONE HYDROCHLORIDE 1 MG/ML
0.2 INJECTION, SOLUTION INTRAMUSCULAR; INTRAVENOUS; SUBCUTANEOUS
Status: DISCONTINUED | OUTPATIENT
Start: 2019-12-08 | End: 2019-12-08 | Stop reason: HOSPADM

## 2019-12-08 RX ORDER — CLONAZEPAM 1 MG/1
0.5 TABLET ORAL
Status: DISCONTINUED | OUTPATIENT
Start: 2019-12-08 | End: 2019-12-08 | Stop reason: HOSPADM

## 2019-12-08 RX ORDER — ONDANSETRON 2 MG/ML
4 INJECTION INTRAMUSCULAR; INTRAVENOUS AS NEEDED
Status: DISCONTINUED | OUTPATIENT
Start: 2019-12-08 | End: 2019-12-08 | Stop reason: HOSPADM

## 2019-12-08 RX ORDER — SODIUM CHLORIDE 9 MG/ML
25 INJECTION, SOLUTION INTRAVENOUS CONTINUOUS
Status: DISCONTINUED | OUTPATIENT
Start: 2019-12-08 | End: 2019-12-08 | Stop reason: HOSPADM

## 2019-12-08 RX ORDER — DEXAMETHASONE SODIUM PHOSPHATE 4 MG/ML
INJECTION, SOLUTION INTRA-ARTICULAR; INTRALESIONAL; INTRAMUSCULAR; INTRAVENOUS; SOFT TISSUE AS NEEDED
Status: DISCONTINUED | OUTPATIENT
Start: 2019-12-08 | End: 2019-12-08 | Stop reason: HOSPADM

## 2019-12-08 RX ORDER — SODIUM CHLORIDE, SODIUM LACTATE, POTASSIUM CHLORIDE, CALCIUM CHLORIDE 600; 310; 30; 20 MG/100ML; MG/100ML; MG/100ML; MG/100ML
1000 INJECTION, SOLUTION INTRAVENOUS CONTINUOUS
Status: DISCONTINUED | OUTPATIENT
Start: 2019-12-08 | End: 2019-12-08 | Stop reason: HOSPADM

## 2019-12-08 RX ORDER — ROPIVACAINE HYDROCHLORIDE 5 MG/ML
30 INJECTION, SOLUTION EPIDURAL; INFILTRATION; PERINEURAL AS NEEDED
Status: DISCONTINUED | OUTPATIENT
Start: 2019-12-08 | End: 2019-12-08 | Stop reason: HOSPADM

## 2019-12-08 RX ORDER — NEOSTIGMINE METHYLSULFATE 1 MG/ML
INJECTION INTRAVENOUS AS NEEDED
Status: DISCONTINUED | OUTPATIENT
Start: 2019-12-08 | End: 2019-12-08 | Stop reason: HOSPADM

## 2019-12-08 RX ORDER — MIDAZOLAM HYDROCHLORIDE 1 MG/ML
INJECTION, SOLUTION INTRAMUSCULAR; INTRAVENOUS AS NEEDED
Status: DISCONTINUED | OUTPATIENT
Start: 2019-12-08 | End: 2019-12-08 | Stop reason: HOSPADM

## 2019-12-08 RX ORDER — SODIUM CHLORIDE 0.9 % (FLUSH) 0.9 %
5-40 SYRINGE (ML) INJECTION EVERY 8 HOURS
Status: DISCONTINUED | OUTPATIENT
Start: 2019-12-08 | End: 2019-12-08 | Stop reason: HOSPADM

## 2019-12-08 RX ORDER — SUCCINYLCHOLINE CHLORIDE 20 MG/ML
INJECTION INTRAMUSCULAR; INTRAVENOUS AS NEEDED
Status: DISCONTINUED | OUTPATIENT
Start: 2019-12-08 | End: 2019-12-08 | Stop reason: HOSPADM

## 2019-12-08 RX ORDER — FENTANYL CITRATE 50 UG/ML
50 INJECTION, SOLUTION INTRAMUSCULAR; INTRAVENOUS AS NEEDED
Status: DISCONTINUED | OUTPATIENT
Start: 2019-12-08 | End: 2019-12-08 | Stop reason: HOSPADM

## 2019-12-08 RX ORDER — MIDAZOLAM HYDROCHLORIDE 1 MG/ML
0.5 INJECTION, SOLUTION INTRAMUSCULAR; INTRAVENOUS
Status: DISCONTINUED | OUTPATIENT
Start: 2019-12-08 | End: 2019-12-08 | Stop reason: HOSPADM

## 2019-12-08 RX ORDER — GLYCOPYRROLATE 0.2 MG/ML
INJECTION INTRAMUSCULAR; INTRAVENOUS AS NEEDED
Status: DISCONTINUED | OUTPATIENT
Start: 2019-12-08 | End: 2019-12-08 | Stop reason: HOSPADM

## 2019-12-08 RX ORDER — HYDROMORPHONE HYDROCHLORIDE 1 MG/ML
1 INJECTION, SOLUTION INTRAMUSCULAR; INTRAVENOUS; SUBCUTANEOUS
Status: DISCONTINUED | OUTPATIENT
Start: 2019-12-08 | End: 2019-12-08 | Stop reason: HOSPADM

## 2019-12-08 RX ORDER — OXYCODONE HYDROCHLORIDE 5 MG/1
5 TABLET ORAL
Status: DISCONTINUED | OUTPATIENT
Start: 2019-12-08 | End: 2019-12-08 | Stop reason: HOSPADM

## 2019-12-08 RX ORDER — ACETAMINOPHEN 325 MG/1
650 TABLET ORAL ONCE
Status: DISCONTINUED | OUTPATIENT
Start: 2019-12-08 | End: 2019-12-08 | Stop reason: HOSPADM

## 2019-12-08 RX ORDER — ALBUTEROL SULFATE 0.83 MG/ML
2.5 SOLUTION RESPIRATORY (INHALATION) AS NEEDED
Status: DISCONTINUED | OUTPATIENT
Start: 2019-12-08 | End: 2019-12-08 | Stop reason: HOSPADM

## 2019-12-08 RX ORDER — HYDROMORPHONE HYDROCHLORIDE 1 MG/ML
1 INJECTION, SOLUTION INTRAMUSCULAR; INTRAVENOUS; SUBCUTANEOUS
Status: DISCONTINUED | OUTPATIENT
Start: 2019-12-08 | End: 2019-12-08

## 2019-12-08 RX ORDER — PROPOFOL 10 MG/ML
INJECTION, EMULSION INTRAVENOUS AS NEEDED
Status: DISCONTINUED | OUTPATIENT
Start: 2019-12-08 | End: 2019-12-08 | Stop reason: HOSPADM

## 2019-12-08 RX ORDER — ACETAMINOPHEN 325 MG/1
650 TABLET ORAL
Status: DISCONTINUED | OUTPATIENT
Start: 2019-12-08 | End: 2019-12-08 | Stop reason: HOSPADM

## 2019-12-08 RX ORDER — FENTANYL CITRATE 50 UG/ML
25 INJECTION, SOLUTION INTRAMUSCULAR; INTRAVENOUS
Status: DISCONTINUED | OUTPATIENT
Start: 2019-12-08 | End: 2019-12-08 | Stop reason: HOSPADM

## 2019-12-08 RX ORDER — MIDAZOLAM HYDROCHLORIDE 1 MG/ML
1 INJECTION, SOLUTION INTRAMUSCULAR; INTRAVENOUS AS NEEDED
Status: DISCONTINUED | OUTPATIENT
Start: 2019-12-08 | End: 2019-12-08 | Stop reason: HOSPADM

## 2019-12-08 RX ORDER — DIPHENHYDRAMINE HYDROCHLORIDE 50 MG/ML
12.5 INJECTION, SOLUTION INTRAMUSCULAR; INTRAVENOUS
Status: DISCONTINUED | OUTPATIENT
Start: 2019-12-08 | End: 2019-12-08 | Stop reason: HOSPADM

## 2019-12-08 RX ORDER — ONDANSETRON 2 MG/ML
4 INJECTION INTRAMUSCULAR; INTRAVENOUS
Status: DISCONTINUED | OUTPATIENT
Start: 2019-12-08 | End: 2019-12-08 | Stop reason: HOSPADM

## 2019-12-08 RX ORDER — ONDANSETRON 2 MG/ML
INJECTION INTRAMUSCULAR; INTRAVENOUS AS NEEDED
Status: DISCONTINUED | OUTPATIENT
Start: 2019-12-08 | End: 2019-12-08 | Stop reason: HOSPADM

## 2019-12-08 RX ORDER — ROCURONIUM BROMIDE 10 MG/ML
INJECTION, SOLUTION INTRAVENOUS AS NEEDED
Status: DISCONTINUED | OUTPATIENT
Start: 2019-12-08 | End: 2019-12-08 | Stop reason: HOSPADM

## 2019-12-08 RX ORDER — LIDOCAINE HYDROCHLORIDE 10 MG/ML
0.1 INJECTION, SOLUTION EPIDURAL; INFILTRATION; INTRACAUDAL; PERINEURAL AS NEEDED
Status: DISCONTINUED | OUTPATIENT
Start: 2019-12-08 | End: 2019-12-08 | Stop reason: HOSPADM

## 2019-12-08 RX ORDER — HYDROCODONE BITARTRATE AND ACETAMINOPHEN 5; 325 MG/1; MG/1
1 TABLET ORAL AS NEEDED
Status: DISCONTINUED | OUTPATIENT
Start: 2019-12-08 | End: 2019-12-08 | Stop reason: HOSPADM

## 2019-12-08 RX ORDER — DIPHENHYDRAMINE HYDROCHLORIDE 50 MG/ML
12.5 INJECTION, SOLUTION INTRAMUSCULAR; INTRAVENOUS AS NEEDED
Status: DISCONTINUED | OUTPATIENT
Start: 2019-12-08 | End: 2019-12-08 | Stop reason: HOSPADM

## 2019-12-08 RX ORDER — FENTANYL CITRATE 50 UG/ML
INJECTION, SOLUTION INTRAMUSCULAR; INTRAVENOUS AS NEEDED
Status: DISCONTINUED | OUTPATIENT
Start: 2019-12-08 | End: 2019-12-08 | Stop reason: HOSPADM

## 2019-12-08 RX ORDER — LORAZEPAM 2 MG/ML
1 INJECTION INTRAMUSCULAR
Status: DISCONTINUED | OUTPATIENT
Start: 2019-12-08 | End: 2019-12-08 | Stop reason: HOSPADM

## 2019-12-08 RX ORDER — LIDOCAINE HYDROCHLORIDE 20 MG/ML
INJECTION, SOLUTION EPIDURAL; INFILTRATION; INTRACAUDAL; PERINEURAL AS NEEDED
Status: DISCONTINUED | OUTPATIENT
Start: 2019-12-08 | End: 2019-12-08 | Stop reason: HOSPADM

## 2019-12-08 RX ORDER — PANTOPRAZOLE SODIUM 40 MG/1
40 TABLET, DELAYED RELEASE ORAL DAILY
Status: DISCONTINUED | OUTPATIENT
Start: 2019-12-08 | End: 2019-12-08 | Stop reason: HOSPADM

## 2019-12-08 RX ORDER — SODIUM CHLORIDE 9 MG/ML
75 INJECTION, SOLUTION INTRAVENOUS CONTINUOUS
Status: DISCONTINUED | OUTPATIENT
Start: 2019-12-08 | End: 2019-12-08 | Stop reason: HOSPADM

## 2019-12-08 RX ORDER — SODIUM CHLORIDE 0.9 % (FLUSH) 0.9 %
5-40 SYRINGE (ML) INJECTION AS NEEDED
Status: DISCONTINUED | OUTPATIENT
Start: 2019-12-08 | End: 2019-12-08 | Stop reason: HOSPADM

## 2019-12-08 RX ADMIN — NEOSTIGMINE METHYLSULFATE 2.5 MG: 1 INJECTION, SOLUTION INTRAMUSCULAR; INTRAVENOUS; SUBCUTANEOUS at 03:09

## 2019-12-08 RX ADMIN — Medication 2 G: at 06:05

## 2019-12-08 RX ADMIN — LIDOCAINE HYDROCHLORIDE 60 MG: 20 INJECTION, SOLUTION EPIDURAL; INFILTRATION; INTRACAUDAL; PERINEURAL at 02:25

## 2019-12-08 RX ADMIN — SODIUM CHLORIDE, SODIUM LACTATE, POTASSIUM CHLORIDE, AND CALCIUM CHLORIDE 1000 ML: 600; 310; 30; 20 INJECTION, SOLUTION INTRAVENOUS at 01:59

## 2019-12-08 RX ADMIN — ROCURONIUM BROMIDE 10 MG: 10 SOLUTION INTRAVENOUS at 02:25

## 2019-12-08 RX ADMIN — SODIUM CHLORIDE 75 ML/HR: 900 INJECTION, SOLUTION INTRAVENOUS at 03:50

## 2019-12-08 RX ADMIN — FENTANYL CITRATE 50 MCG: 50 INJECTION, SOLUTION INTRAMUSCULAR; INTRAVENOUS at 02:25

## 2019-12-08 RX ADMIN — ROCURONIUM BROMIDE 20 MG: 10 SOLUTION INTRAVENOUS at 02:38

## 2019-12-08 RX ADMIN — PROPOFOL 150 MG: 10 INJECTION, EMULSION INTRAVENOUS at 02:25

## 2019-12-08 RX ADMIN — PANTOPRAZOLE SODIUM 40 MG: 40 TABLET, DELAYED RELEASE ORAL at 10:01

## 2019-12-08 RX ADMIN — ONDANSETRON HYDROCHLORIDE 4 MG: 2 INJECTION, SOLUTION INTRAMUSCULAR; INTRAVENOUS at 02:30

## 2019-12-08 RX ADMIN — Medication 10 ML: at 06:05

## 2019-12-08 RX ADMIN — SUCCINYLCHOLINE CHLORIDE 100 MG: 20 INJECTION, SOLUTION INTRAMUSCULAR; INTRAVENOUS at 02:26

## 2019-12-08 RX ADMIN — HYDROMORPHONE HYDROCHLORIDE 0.5 MG: 1 INJECTION, SOLUTION INTRAMUSCULAR; INTRAVENOUS; SUBCUTANEOUS at 02:46

## 2019-12-08 RX ADMIN — FENTANYL CITRATE 50 MCG: 50 INJECTION, SOLUTION INTRAMUSCULAR; INTRAVENOUS at 02:32

## 2019-12-08 RX ADMIN — MIDAZOLAM 2 MG: 1 INJECTION INTRAMUSCULAR; INTRAVENOUS at 02:16

## 2019-12-08 RX ADMIN — DEXAMETHASONE SODIUM PHOSPHATE 4 MG: 4 INJECTION, SOLUTION INTRAMUSCULAR; INTRAVENOUS at 02:30

## 2019-12-08 RX ADMIN — GLYCOPYRROLATE 0.4 MG: 0.2 INJECTION, SOLUTION INTRAMUSCULAR; INTRAVENOUS at 03:09

## 2019-12-08 NOTE — OP NOTES
Gynecologic Oncology Operative Report    Jerolyn Sicard  12/8/2019    Pre-operative dx:  1) Vaginal cuff dehiscence      Post-operative dx:  1) Vaginal cuff dehiscence    Procedure:    Pelvic exam under anesthesia, vaginal cuff repair and closure     Surgeon:  Brian Mahoney MD     Assistant:  n/a     Anesthesia:  GETA     EBL:  minimal    Antibiotics: Zosyn given in ER prior to transfer to [facility]    VTE Prophylaxis: SCDs     Complications:  None    Implants: None     Specimens:  none     Operative indications:  47 y.o. female s/p TLH/BSO on 10/16/19 with benign pathology presenting with vaginal cuff dehiscence    Operative findings: On exam under anesthesia the vaginal packing was removed and the vaginal cuff had completely dehisced. The suture was still present, but was clearly torn through. Normal appearing healthy small bowel was peristalsing above the vaginal cuff. The bowel pushed through the vaginal cuff initially until placed in Trendelenburg. Once in Trendelenburg the bowel was displaced superiorly. The edges of the vaginal cuff was healthy appearing and well vascularized. The vaginal cuff was closed with 6 interrupted figure-of-8 stitches using 0 Vicryl. Operative report: After informed consent was obtained, the patient was taken to the operating room where anesthesia was administered and deemed adequate. The patient was positioned in the dorsal lithotomy position in [address] [place] [address]. Time-out was performed. The patient had vaginal packing and a darden catheter in place. The patient was prepped and draped in the normal sterile fashion, and the catheter and packing were left in place initially until fully draped. The vaginal packing was then removed and the patient was examined under anesthesia and the above findings were noted. The vagina was then prepped gently with 2 betadine sticks. As noted above, while the bowel had protruded through the vagina, the packing was keeping it in place.  As well, the bowel above the vaginal cuff was normal and healthy appearing. A weighted-speculum and hand-held Olivier were then placed in the patient's vagina. The anterior and posterior edges of the vaginal cuff were then grasped with Kaila clamps. The patient was placed in Trendelenburg position and the bowel was displaced superiorly. The vaginal cuff was then closed with 6 interrupted figure-of-8 stitches using 0-Vicryl. The vagina was then irrigated and a digital exam confirmed closure of the entire vaginal cuff. Each stitch was visualized to ensure that the bowel did not return into the operative field. At this time all instruments were removed from the vagina. Instrument, sponge and needle counts were correct x2. She was extubated in the operating room and taken to the recovery room in stable condition.      jV Ventura MD  12/8/19

## 2019-12-08 NOTE — ANESTHESIA PREPROCEDURE EVALUATION
Relevant Problems   No relevant active problems       Anesthetic History   No history of anesthetic complications            Review of Systems / Medical History  Patient summary reviewed, nursing notes reviewed and pertinent labs reviewed    Pulmonary  Within defined limits                 Neuro/Psych         TIA     Cardiovascular  Within defined limits                Exercise tolerance: >4 METS     GI/Hepatic/Renal  Within defined limits   GERD: well controlled           Endo/Other  Within defined limits      Arthritis     Other Findings            Physical Exam    Airway  Mallampati: II  TM Distance: > 6 cm  Neck ROM: normal range of motion   Mouth opening: Normal     Cardiovascular  Regular rate and rhythm,  S1 and S2 normal,  no murmur, click, rub, or gallop             Dental  No notable dental hx       Pulmonary  Breath sounds clear to auscultation               Abdominal  GI exam deferred       Other Findings            Anesthetic Plan    ASA: 2  Anesthesia type: general          Induction: Intravenous  Anesthetic plan and risks discussed with: Patient

## 2019-12-08 NOTE — DISCHARGE INSTRUCTIONS
[facility] [address], [address]  P [phone]  F [phone]     Emerald Genao      Dear MsAllen Merlene Gutierrez,      Please review your instructions with your nurse and ask any questions so you have all the information you need to recover well at home. If you do not feel you have everything you need to succeed and be safe after you leave the hospital, please discuss these concerns with your nurse. As always, call for any questions at home. Your doctor: ***  Diagnosis: Vaginal cuff dehiscence [T81.31XA]  Procedure: Procedure(s):  Vaginal vault closure  Date of Discharge: ***      Take Home Medications     See Discharge Medication Review provided to you by your nurse. If you did not receive one, request this prior to your discharge. · It is important that you take your medications as they are prescribed. · Keep your medications in the bottles provided by the pharmacist and keep a list of the medication names, dosages, times to be taken and what they are for in your wallet. · Do not take other medications without consulting your doctor. · If you are prescribed enoxaparin (Lovenox) and are taking a baby aspirin daily, please hold this medication until your course of enoxaparin is completed. · If you no longer need your prescribed pain medication, you may take Tylenol or Aleve alone. · You should take a daily gentle stool softener such as a colace pill or dulcolax suppository for constipation as this is not uncommon after surgery and/or while on pain medication. If not prescribed, this can be found over the counter. If constipation persists for >24 hours you should take a dose of Milk of Magnesia. Call if your constipation continues. Diet    · Stay hydrated and drink fluids such as gatorade and water. This will also help prevent constipation and dehydration.  Limit somewhat any usual caffeine intake of beverages such as soda, tea and coffee as this may serve to dehydrate you. · For the first 2-3 days keep a low fiber diet avoiding raw vegetables or fruits with skin. A diet consisting of soup, cereal, yogurt, eggs, fish, Boost/Ensure. Avoid fatty/greasy foods. · If nauseated, keep your diet limited to liquids and call if this persists. Activity    · If possible, have someone with you at all times until you feel stable. · Gradually increase your activity each day. There are generally no restrictions on walking, climbing stairs or riding in a car. Try to walk at least 4 times per day. · Showers are okay. If you have an incision, no tub bathing/swimming for two weeks. · No driving for 2 week and/or while on pain medication. · There are no lifting restrictions if you did not have surgery. · If you had surgery, the following restrictions are in place:  1. If you had a laparoscopic procedure, no lifting greater than 25 lbs for 3 weeks. 2. If you had an open procedure, no heavy lifting greater than 15 lbs for 8 weeks. 3. If you had a hysterectomy, nothing per vagina for 6-8 weeks. 4. If you had any type of vaginal procedure, you may experience vaginal spotting. Should the bleeding require more that 4 pads a day please call our office. Incision    · You should expect some discomfort in the area of your incision, particularly as you increase your activity. If you notice an area of increasing redness or new drainage, please call your doctor. · Staples are generally removed in 10-14 days. · Many incisions will have buried absorbable sutures, which do not need to be removed and are covered by protective glue. This will come off over time. Causes For Concern    If any of the following occur, please call our office and speak with the Nurse/aid who will help you with your problem or ask the doctor to call you.  Problems with the incision, including increasing pain, swelling, redness or drainage.     Inability to pass urine  Increasing abdominal pain despite medication   Persisting nausea or vomiting.  Fever or chills and a temperature >101F   Constipation (no bowel movement for three days).  Diarrhea (more than three watery stools within 24 hours).  Excessive vaginal or wound bleeding.  If after hours and you cannot reach an on-call physician, call 911. Follow-Up    Call [phone] to schedule an appointment with *** in {0-10:20148} {day/week/month:73372}. Information obtained by :  I understand that if any problems occur once I am at home I am to contact my physician. I understand and acknowledge receipt of the instructions indicated above.                                                                                                                                            Physician's or R.N.'s Signature                                                                  Date/Time                                                                                                                                              Patient or Representative Signature                                                          Date/Time

## 2019-12-08 NOTE — PROGRESS NOTES
[facility] GYNECOLOGIC ONCOLOGY  [address] [facility], [address], [address] [facility]  P [phone]  F [phone]       Patient Name: Sha Perdomo   Admit Date: 12/8/2019   OR Date: 12/8/2019       Visit Date: 12/8/2019        SUBJECTIVE:    POD s/p vaginal cuff repair. Doing well. No pain.  No vaginal bleeding    OBJECTIVE:    Patient Vitals for the past 24 hrs:   Temp Pulse Resp BP SpO2   12/08/19 0746 98.4 °F (36.9 °C) 68 16 96/65 97 %   12/08/19 0700 99.1 °F (37.3 °C) 80 16 102/68 96 %   12/08/19 0600 98.5 °F (36.9 °C) 72 16 99/60 95 %   12/08/19 0500 98.6 °F (37 °C) 74 16 112/67 94 %   12/08/19 0400 99 °F (37.2 °C) 69 16 112/68 94 %   12/08/19 0350  76 15  94 %   12/08/19 0345 99.3 °F (37.4 °C) 84 15 118/64 95 %   12/08/19 0340  90 18  98 %   12/08/19 0335  78 12 115/69 99 %   12/08/19 0330  82 13 110/63 97 %   12/08/19 0327 99.1 °F (37.3 °C) 88 14 106/66 97 %   12/08/19 0325  80 12 106/66 95 %   12/08/19 0323 99.1 °F (37.3 °C) 94 20 118/66 95 %   12/08/19 0152 100.3 °F (37.9 °C) 80 16 114/66 96 %   12/08/19 0120 99.1 °F (37.3 °C) 77 20 122/74 97 %       Date 12/07/19 0700 - 12/08/19 0659(Not Admitted) 12/08/19 0700 - 12/09/19 0659   Shift 3088-0472 0843-8774 24 Hour Total 9291-5580 1354-0932 24 Hour Total   INTAKE   I.V.  1012.5 1012.5        I.V.  300 300        Volume (lactated Ringers infusion 1,000 mL)  700 700        Volume (0.9% sodium chloride infusion)  12.5 12.5      Shift Total  1012.5 1012.5      OUTPUT   Urine  950 950        Urine Output (mL) (Urinary Catheter 12/07/19 Justin)  950 950      Blood  5 5        Estimated Blood Loss  5 5      Shift Total  955 955      NET  57.5 57.5      Weight (kg)             Physical Exam     General:  alert, cooperative, no distress     Cardiac:  Regular rate and rhythm        Lungs:  clear to auscultation bilaterally  Abdomen:  soft, non-tender, without masses or organomegaly       Wound:  no abdominal wounds   Extremity: extremities normal, atraumatic, no cyanosis or edema    Date Review  Lab Results   Component Value Date/Time    WBC 9.3 12/07/2019 08:17 PM    ABS. NEUTROPHILS 7.2 12/07/2019 08:17 PM    HGB 13.2 12/07/2019 08:17 PM    HCT 40.0 12/07/2019 08:17 PM    MCV 95.2 12/07/2019 08:17 PM    MCH 31.4 12/07/2019 08:17 PM    PLATELET 587 56/22/3351 08:17 PM     Lab Results   Component Value Date/Time    Sodium 141 12/07/2019 08:17 PM    Potassium 3.5 12/07/2019 08:17 PM    Chloride 106 12/07/2019 08:17 PM    CO2 30 12/07/2019 08:17 PM    Glucose 92 12/07/2019 08:17 PM    BUN 12 12/07/2019 08:17 PM    Creatinine 0.88 12/07/2019 08:17 PM    Calcium 8.3 (L) 12/07/2019 08:17 PM    Albumin 4.0 12/07/2019 08:17 PM    Bilirubin, total 0.2 12/07/2019 08:17 PM    AST (SGOT) 10 (L) 12/07/2019 08:17 PM    ALT (SGPT) 19 12/07/2019 08:17 PM    Alk. phosphatase 78 12/07/2019 08:17 PM     IMPRESSION/PLAN:    Day of Surgery Procedure(s):  Vaginal vault closure for VAGINAL CUFF DEHISCENCE    Oncologic:  benign pathology from initial TLH/BSO on 10/16/19. Heme/CV:  f/u AM CBC   Renal:  darden out, awaiting void       FEN/GI:  ADAT   ID/Wound:  no vaginal bleeding. Nothing per vagina for total of 3 months. Plan for 5 days of augmentin given vagina was open to the peritoneum for approximately 10 hours. PPX:  SCDs, ambulation, IS   Dispostion:  Doing well postoperatively. F/u am labs. If appropriate, plan for discharge this morning. F/u with Dr. Fany Marin in 6 weeks for postop visit.  Brittnee Stiles MD

## 2019-12-08 NOTE — ED TRIAGE NOTES
Patient arrives with complains of vaginal bleeding and pelvic pain after using vibrator. Patient had hysterectomy in October and has not had intercourse since.

## 2019-12-08 NOTE — H&P
Gyn Consult    Subjective:     Radha Montes is a 47 y.o.  postmenopausal female who is being seen for vaginal bleeding following vibrator usage for the first time following TLH/BSO on 16OCT19 for pelvic congestion syndrome by Dr. Mp Monae at [facility]). Patient had done well following surgery and is s/p her normal post op follow up on 5 Nov.  Was told to continue pelvic rest until 10 weeks which was this weekend. Pt did not feel pain initially with insertion of vibraor but then had to pull on the vibrator to get it out of her vagina and then she noticed the blood which has continued like a light menses since. Reports some cramping but otherwise no complaint of pain. Denies N/V. Denies F/C. Denies seeing anything other than blood coming out of her vagina. Last po was 1730.         Patient Active Problem List    Diagnosis Date Noted    S/P hysterectomy 10/16/2019    Pelvic congestion syndrome 09/26/2019    Burning sensation of feet 10/31/2016    TIA (transient ischemic attack) 06/27/2015    Paresthesia of left arm 06/27/2015    Chronic neck pain 06/27/2015    Anxiety 05/21/2015    Injury of elbow, left 01/02/2015    Swelling of joint, elbow, left 01/02/2015    GERD (gastroesophageal reflux disease) 03/12/2014    Unspecified hypothyroidism 12/19/2012    Panic disorder without agoraphobia 05/09/2012     Past Medical History:   Diagnosis Date    Anxiety     Arthritis     Cancer ([facility] [address]     BASAL CELL SKIN CANCER ON CHEST    Chronic neck pain 6/27/2015    Fatigue     GERD (gastroesophageal reflux disease) 3/12/2014    Headache     Hiatal hernia     Panic disorder     PUD (peptic ulcer disease)     TIA (transient ischemic attack) 6/27/2015      Past Surgical History:   Procedure Laterality Date    HX GYN      BTL    HX HEENT      HX WISDOM TEETH EXTRACTION        Social History     Tobacco Use    Smoking status: Never Smoker    Smokeless tobacco: Never Used   Substance Use Topics    Alcohol use: Yes     Alcohol/week: 2.0 standard drinks     Types: 2 Glasses of wine per week     Comment: occ      Family History   Problem Relation Age of Onset    Cancer Father     Hypertension Mother     Heart Disease Paternal Grandfather     Anesth Problems Sister         \"OUT OF IT FOR ONE WEEK POST OP\"    Anesth Problems Other     Alcohol abuse Neg Hx     Arthritis-rheumatoid Neg Hx     Asthma Neg Hx     Bleeding Prob Neg Hx     Diabetes Neg Hx     Elevated Lipids Neg Hx     Headache Neg Hx     Lung Disease Neg Hx     Migraines Neg Hx     Psychiatric Disorder Neg Hx     Stroke Neg Hx     Mental Retardation Neg Hx       Prior to Admission Medications   Prescriptions Last Dose Informant Patient Reported? Taking? Biotin 2,500 mcg cap   Yes No   Sig: Take 2,500 mcg by mouth. CALCIUM PO   Yes No   Sig: Take 1,200 mg by mouth daily. cholecalciferol, vitamin D3, (VITAMIN D3 PO)   Yes No   Sig: Take 50 mcg by mouth.   citalopram (CELEXA) 20 mg tablet   No No   Sig: Take 1 Tab by mouth daily. Patient taking differently: Take 10 mg by mouth daily. clonazePAM (KLONOPIN) 0.5 mg tablet   No No   Sig: Take 1 Tab by mouth nightly as needed (anxiety). Max Daily Amount: 0.5 mg.   estradiol (VAGIFEM) 10 mcg tab vaginal tablet   Yes No   Sig: Insert 10 mcg into vagina every Monday and Thursday. fluticasone propionate (FLONASE ALLERGY RELIEF) 50 mcg/actuation nasal spray   Yes No   Si Sprays by Both Nostrils route daily. pantoprazole (PROTONIX) 40 mg tablet   Yes No   Sig: Take 40 mg by mouth daily. valACYclovir (VALTREX) 500 mg tablet   Yes No   Sig: Take 500 mg by mouth as needed. Facility-Administered Medications: None     Allergies   Allergen Reactions    Naproxen Other (comments)     Abdominal Pain    Other Medication Other (comments)     Z-PACK: FELT WEAK FOR ONE WEEK AFTER TAKING AND VERY LIGHT HEADED.         Review of Systems:  10 point ROS,  A comprehensive review of systems was negative except for that written in the History of Present Illness. Objective:     Patient Vitals for the past 8 hrs:   BP Temp Pulse Resp SpO2 Height Weight   19 2214   93       19 2213     97 %     193     99 %     19 111/65    99 %     19 134/86 98.3 °F (36.8 °C) (!) 102 17 97 % 5' 6\" (1.676 m) 60.3 kg (133 lb)     Temp (24hrs), Av.3 °F (36.8 °C), Min:98.3 °F (36.8 °C), Max:98.3 °F (36.8 °C)    Physical Exam:   Gen:  WDWN, NAD, well appearing  Heart:  RRR without m/g/r  Lungs:  CTAB without w/c/r  Abd:  Soft, NTTP, ND  LE:  Neg edema  Pelvic:  NEFG except for some mild atropy. Speculum:  Vaginal cuff dehisced with bowel prolapsing into the proximal vagina. Dark blood in vault. No active bleeding. Patient placed in t-son to facilitate bowel returning to abdomen. NS moistened kerlex (1) was utilized for vag packing and darden to gravity placed. Labs:    Recent Results (from the past 24 hour(s))   CBC WITH AUTOMATED DIFF    Collection Time: 19  8:17 PM   Result Value Ref Range    WBC 9.3 3.6 - 11.0 K/uL    RBC 4.20 3.80 - 5.20 M/uL    HGB 13.2 11.5 - 16.0 g/dL    HCT 40.0 35.0 - 47.0 %    MCV 95.2 80.0 - 99.0 FL    MCH 31.4 26.0 - 34.0 PG    MCHC 33.0 30.0 - 36.5 g/dL    RDW 12.8 11.5 - 14.5 %    PLATELET 062 051 - 965 K/uL    MPV 9.6 8.9 - 12.9 FL    NRBC 0.0 0  WBC    ABSOLUTE NRBC 0.00 0.00 - 0.01 K/uL    NEUTROPHILS 78 (H) 32 - 75 %    LYMPHOCYTES 17 12 - 49 %    MONOCYTES 3 (L) 5 - 13 %    EOSINOPHILS 2 0 - 7 %    BASOPHILS 0 0 - 1 %    IMMATURE GRANULOCYTES 0 0.0 - 0.5 %    ABS. NEUTROPHILS 7.2 1.8 - 8.0 K/UL    ABS. LYMPHOCYTES 1.6 0.8 - 3.5 K/UL    ABS. MONOCYTES 0.3 0.0 - 1.0 K/UL    ABS. EOSINOPHILS 0.2 0.0 - 0.4 K/UL    ABS. BASOPHILS 0.0 0.0 - 0.1 K/UL    ABS. IMM.  GRANS. 0.0 0.00 - 0.04 K/UL    DF AUTOMATED     SAMPLES BEING HELD    Collection Time: 19  8:17 PM   Result Value Ref Range SAMPLES BEING HELD 1PST     COMMENT        Add-on orders for these samples will be processed based on acceptable specimen integrity and analyte stability, which may vary by analyte. SAMPLES BEING HELD    Collection Time: 12/07/19  8:17 PM   Result Value Ref Range    SAMPLES BEING HELD 1SST,1RED     COMMENT        Add-on orders for these samples will be processed based on acceptable specimen integrity and analyte stability, which may vary by analyte. PTT    Collection Time: 12/07/19  8:17 PM   Result Value Ref Range    aPTT 23.9 22.1 - 32.0 sec    aPTT, therapeutic range     58.0 - 77.0 SECS   PROTHROMBIN TIME + INR    Collection Time: 12/07/19  8:17 PM   Result Value Ref Range    INR 1.0 0.9 - 1.1      Prothrombin time 10.3 9.0 - 61.6 sec   METABOLIC PANEL, COMPREHENSIVE    Collection Time: 12/07/19  8:17 PM   Result Value Ref Range    Sodium 141 136 - 145 mmol/L    Potassium 3.5 3.5 - 5.1 mmol/L    Chloride 106 97 - 108 mmol/L    CO2 30 21 - 32 mmol/L    Anion gap 5 5 - 15 mmol/L    Glucose 92 65 - 100 mg/dL    BUN 12 6 - 20 MG/DL    Creatinine 0.88 0.55 - 1.02 MG/DL    BUN/Creatinine ratio 14 12 - 20      GFR est AA >60 >60 ml/min/1.73m2    GFR est non-AA >60 >60 ml/min/1.73m2    Calcium 8.3 (L) 8.5 - 10.1 MG/DL    Bilirubin, total 0.2 0.2 - 1.0 MG/DL    ALT (SGPT) 19 12 - 78 U/L    AST (SGOT) 10 (L) 15 - 37 U/L    Alk. phosphatase 78 45 - 117 U/L    Protein, total 7.0 6.4 - 8.2 g/dL    Albumin 4.0 3.5 - 5.0 g/dL    Globulin 3.0 2.0 - 4.0 g/dL    A-G Ratio 1.3 1.1 - 2.2         Imaging:  EXAM: US PELV NON OBS     INDICATION: Pelvic pain and vaginal bleeding after insertion of a vaginal  vibrator today. Hysterectomy last month.     COMPARISON: None.     TECHNIQUE: Transabdominal pelvis ultrasound.     FINDINGS: Urinary bladder is partially distended. Uterus is been resected. Ovaries are not visible due to surgical resection, small size, or bowel  obscuration. No pelvic cyst or mass.  No fluid collection.     IMPRESSION  No mass or fluid collection. Assessment:     46 yo WF 10 weeks s/p TLH/BSO now with vaginal cuff dehiscence. Hemodynamically stable. No e/o infection. Plan:     1. D/W Dr. Ken Wilkinson [facility] covering for Dr. Noemi Early). Transfer to [facility] for repair. 2.  Vagina packed with SINGLE moistened kerlex and darden to gravity placed. 3.  Zosyn IV  4.   NPO    Signed By: Shelley Jeffery MD     December 7, 2019

## 2019-12-08 NOTE — PROCEDURES
Normal external genitalia. On insertion of speculum, moderate amount of BRB noted pooling in the vault. Unable to localize source of bleeding. Speculum repositioned, opened further in an attempt to visualize bleeding source, mass of tissue protruded into the vagina.     OUMOU Mills  8:53 PM

## 2019-12-08 NOTE — ED NOTES
S/p total hysterectomy 10/16, Dr. Radha Khoury. After sexual activity today had severe pain, bleeding. Referred to the ED by Dr. Radha Khoury.

## 2019-12-08 NOTE — PERIOP NOTES
TRANSFER - OUT REPORT:    Verbal report given to Cherelle RN(name) on [facility]  being transferred to [facility](unit) for routine post - op       Report consisted of patients Situation, Background, Assessment and   Recommendations(SBAR). Time Pre op antibiotic given:see mar  Anesthesia Stop time: 9571  Justin Present on Transfer to floor:yes  Order for Justin on Chart:yes  Discharge Prescriptions with Chart:no    Information from the following report(s) Kardex, OR Summary, Intake/Output and MAR was reviewed with the receiving nurse. Opportunity for questions and clarification was provided. Is the patient on 02? NO       L/Min n/a       Other n/a    Is the patient on a monitor? NO    Is the nurse transporting with the patient? YES    Surgical Waiting Area notified of patient's transfer from PACU?  YES      The following personal items collected during your admission accompanied patient upon transfer:   Dental Appliance: Dental Appliances: None  Vision: Visual Aid: Glasses, With patient  Hearing Aid:    Jewelry:    Clothing:    Other Valuables:    Valuables sent to safe:

## 2019-12-08 NOTE — PROGRESS NOTES
0000:  TRANSFER - IN REPORT:    Verbal report received from [address] [facility] [facility] [address] May, RN(name) on [facility]  being received from [facility]) for urgent transfer      Report consisted of patients Situation, Background, Assessment and   Recommendations(SBAR). Information from the following report(s) SBAR, Kardex, ED Summary, Intake/Output, MAR, Accordion and Recent Results was reviewed with the receiving nurse. Opportunity for questions and clarification was provided. Assessment completed upon patients arrival to unit and care assumed. 0120:  Pt arrived on the unit. CHG bath given and new gown and linens changed. Pre-procedure checklist done. Informed Dr. Zak Bustillo that the patient has arrived and new orders were given    : Off the floor to OR.    0345:  TRANSFER - IN REPORT:    Verbal report received from Adalid Hedrick RN(name) on [facility]  being received from [employer]) for routine post - op      Report consisted of patients Situation, Background, Assessment and   Recommendations(SBAR). Information from the following report(s) SBAR, Kardex, OR Summary, Procedure Summary, Intake/Output, MAR, Accordion and Recent Results was reviewed with the receiving nurse. Opportunity for questions and clarification was provided. Assessment completed upon patients arrival to unit and care assumed.

## 2019-12-08 NOTE — BRIEF OP NOTE
BRIEF OPERATIVE NOTE    Date of Procedure: 12/8/2019   Preoperative Diagnosis: VAGINAL CUFF DEHISCENCE  Postoperative Diagnosis: VAGINAL CUFF DEHISCENCE    Procedure(s):  Vaginal vault closure  Surgeon(s) and Role:     Rosa Schultz MD - Primary         Surgical Assistant: n/a    Surgical Staff:  Circ-1: Rommel Zhao RN  Scrub Tech-1: Radha HILLS  Event Time In Time Out   Incision Start 0238    Incision Close 0309      Anesthesia: General   Estimated Blood Loss: minimal  Specimens: * No specimens in log *   Findings: On exam under anesthesia the vaginal packing was removed and the vaginal cuff had completely dehisced. The suture was still present, but was clearly torn through. Normal appearing healthy small bowel was peristalsing above the vaginal cuff. The bowel pushed through the vaginal cuff initially until placed in Trendelenburg. Once in Trendelenburg the bowel was displaced superiorly. The edges of the vaginal cuff was healthy appearing and well vascularized. The vaginal cuff was closed with 6 interrupted figure-of-8 stitches using 0 Vicryl.     Complications: none  Implants: * No implants in log *    Lorne Falcon MD

## 2019-12-08 NOTE — ANESTHESIA POSTPROCEDURE EVALUATION
Post-Anesthesia Evaluation and Assessment    Patient: Radha Montes MRN: [id number]  SSN: [id number]    YOB: 1965  Age: 47 y.o. Sex: female      I have evaluated the patient and they are stable and ready for discharge from the PACU. Cardiovascular Function/Vital Signs  Visit Vitals  /68 (BP 1 Location: Left arm, BP Patient Position: At rest;Head of bed elevated (Comment degrees))   Pulse 69   Temp 37.2 °C (99 °F)   Resp 16   SpO2 94%       Patient is status post General anesthesia for Procedure(s):  Vaginal vault closure. Nausea/Vomiting: None    Postoperative hydration reviewed and adequate. Pain:  Pain Scale 1: Numeric (0 - 10) (12/08/19 0400)  Pain Intensity 1: 0 (12/08/19 0400)   Managed    Neurological Status:   Neuro (WDL): Within Defined Limits (12/08/19 0345)  Neuro  Neurologic State: Alert (12/08/19 0400)  LUE Motor Response: Purposeful (12/08/19 0323)  LLE Motor Response: Purposeful (12/08/19 0323)  RUE Motor Response: Purposeful (12/08/19 0323)  RLE Motor Response: Purposeful (12/08/19 0323)   At baseline    Mental Status, Level of Consciousness: Alert and  oriented to person, place, and time    Pulmonary Status:   O2 Device: Room air (12/08/19 0400)   Adequate oxygenation and airway patent    Complications related to anesthesia: None    Post-anesthesia assessment completed. No concerns    Signed By: Jena Lozano MD     December 8, 2019              Procedure(s):  Vaginal vault closure. general    <BSHSIANPOST>    Vitals Value Taken Time   /64 12/8/2019  3:45 AM   Temp 37.4 °C (99.3 °F) 12/8/2019  3:45 AM   Pulse 80 12/8/2019  3:51 AM   Resp 15 12/8/2019  3:51 AM   SpO2 94 % 12/8/2019  3:51 AM   Vitals shown include unvalidated device data.

## 2019-12-08 NOTE — ED NOTES
[facility] here to transport patient to [address] [place] [address] Rd room 305. Patient left facility in stable condition, VSS stable, EMTALA sent with patient.   Patient and family aware of transfer and in agreement

## 2019-12-08 NOTE — ED PROVIDER NOTES
The patient is a 63-year-old female with a past medical history significant for arthritis, anxiety, basal cell carcinoma of the chest, chronic neck pain, fatigue, GERD, headache, hiatal hernia, panic attack, TIA, peptic ulcer disease, status post total abdominal hysterectomy with bilateral salpingo-oophorectomy who presents to the ED with a complaint of vaginal bleeding that began approximately 5 hours ago. Since her hysterectomy approximately 2 months ago she has been doing well and has not had any sexual activity until today. She used a vibrator in the act and began having vaginal bleeding afterwards. Describes several episodes of bleeding and spotting spotting and use approximately 2-3 pads since the incident. She also complains of abdominal cramps. She denies any fever, sore throat, cough, congestion, headache, neck and back pain, chest pain, shortness of breath, nausea, vomiting, diarrhea, constipation, dysuria, hematuria, dizziness, extremity weakness or numbness, prior history of the same.            Past Medical History:   Diagnosis Date    Anxiety     Arthritis     Cancer ([facility] [address]     BASAL CELL SKIN CANCER ON CHEST    Chronic neck pain 6/27/2015    Fatigue     GERD (gastroesophageal reflux disease) 3/12/2014    Headache     Hiatal hernia     Panic disorder     PUD (peptic ulcer disease)     TIA (transient ischemic attack) 6/27/2015       Past Surgical History:   Procedure Laterality Date    HX GYN      BTL    HX HEENT      HX WISDOM TEETH EXTRACTION           Family History:   Problem Relation Age of Onset    Cancer Father     Hypertension Mother     Heart Disease Paternal Grandfather     Anesth Problems Sister         \"OUT OF IT FOR ONE WEEK POST OP\"    Anesth Problems Other     Alcohol abuse Neg Hx     Arthritis-rheumatoid Neg Hx     Asthma Neg Hx     Bleeding Prob Neg Hx     Diabetes Neg Hx     Elevated Lipids Neg Hx     Headache Neg Hx     Lung Disease Neg Hx     Migraines Neg Hx     Psychiatric Disorder Neg Hx     Stroke Neg Hx     Mental Retardation Neg Hx        Social History     Socioeconomic History    Marital status:      Spouse name: Not on file    Number of children: Not on file    Years of education: Not on file    Highest education level: Not on file   Occupational History    Not on file   Social Needs    Financial resource strain: Not on file    Food insecurity:     Worry: Not on file     Inability: Not on file    Transportation needs:     Medical: Not on file     Non-medical: Not on file   Tobacco Use    Smoking status: Never Smoker    Smokeless tobacco: Never Used   Substance and Sexual Activity    Alcohol use:  Yes     Alcohol/week: 2.0 standard drinks     Types: 2 Glasses of wine per week     Comment: occ    Drug use: No    Sexual activity: Yes     Partners: Male     Birth control/protection: Surgical   Lifestyle    Physical activity:     Days per week: Not on file     Minutes per session: Not on file    Stress: Not on file   Relationships    Social connections:     Talks on phone: Not on file     Gets together: Not on file     Attends [employer] service: Not on file     Active member of club or organization: Not on file     Attends meetings of clubs or organizations: Not on file     Relationship status: Not on file    Intimate partner violence:     Fear of current or ex partner: Not on file     Emotionally abused: Not on file     Physically abused: Not on file     Forced sexual activity: Not on file   Other Topics Concern     Service No    Blood Transfusions No    Caffeine Concern No    Occupational Exposure No    Hobby Hazards No    Sleep Concern Yes    Stress Concern Yes    Weight Concern Yes    Special Diet No    Back Care No    Exercise Yes    Bike Helmet No    Seat Belt Yes    Self-Exams Not Asked   Social History Narrative    Not on file         ALLERGIES: Naproxen and Other medication    Review of Systems   All other systems reviewed and are negative. Vitals:    12/07/19 1957   BP: 134/86   Pulse: (!) 102   Resp: 17   Temp: 98.3 °F (36.8 °C)   SpO2: 97%   Weight: 60.3 kg (133 lb)   Height: 5' 6\" (1.676 m)            Physical Exam  Vitals signs and nursing note reviewed. CONSTITUTIONAL: Well-appearing; well-nourished; in no apparent distress  HEAD: Normocephalic; atraumatic  EYES: PERRL; EOM intact; conjunctiva and sclera are clear bilaterally. ENT: No rhinorrhea; normal pharynx with no tonsillar hypertrophy; mucous membranes pink/moist, no erythema, no exudate. NECK: Supple; non-tender; no cervical lymphadenopathy  CARD: Normal S1, S2; no murmurs, rubs, or gallops. Regular rate and rhythm. RESP: Normal respiratory effort; breath sounds clear and equal bilaterally; no wheezes, rhonchi, or rales. ABD: Normal bowel sounds; non-distended; non-tender; no palpable organomegaly, no masses, no bruits. Back Exam: Normal inspection; no vertebral point tenderness, no CVA tenderness. Normal range of motion. EXT: Normal ROM in all four extremities; non-tender to palpation; no swelling or deformity; distal pulses are normal, no edema. SKIN: Warm; dry; no rash. NEURO:Alert and oriented x 3, coherent, RAJENDRA-XII grossly intact, sensory and motor are non-focal.      MDM  Number of Diagnoses or Management Options  Dehiscence of vaginal cuff, initial encounter:   S/P hysterectomy:   Vaginal bleeding:   Diagnosis management comments: Assessment: Vaginal bleeding status post hysterectomy with post coital activity. The patient appears hemodynamically stable. Differential diagnosis include vaginal tear/laceration/wound dehiscence, rule out anemia      Plan: Lab/IV fluid/ultrasound pelvis/consult obstetrician/ Monitor and Reevaluate.          Amount and/or Complexity of Data Reviewed  Clinical lab tests: ordered and reviewed  Tests in the radiology section of CPT®: ordered and reviewed  Tests in the medicine section of CPT®: reviewed and ordered  Discussion of test results with the performing providers: yes  Decide to obtain previous medical records or to obtain history from someone other than the patient: yes  Obtain history from someone other than the patient: yes  Review and summarize past medical records: yes  Discuss the patient with other providers: yes  Independent visualization of images, tracings, or specimens: yes    Risk of Complications, Morbidity, and/or Mortality  Presenting problems: moderate  Diagnostic procedures: moderate  Management options: moderate    Critical Care  Total time providing critical care: (Total critical care time spent exclusive of procedures: 45 minutes)    Patient Progress  Patient progress: stable         Procedures    CONSULT NOTE:  I spoke with Dr. Bautista of the [facility] hospitalist group discussed patient's presentation, history, physical assessment, and available diagnostic results. Will come and see the patient in the ED. PROGRESS NOTE:  Pt has been reexamined by me. She was seen and evaluated by the [facility] hospitalist and it appeared that the patient has dehisced her cuff from the hysterectomy and will need surgical evaluation by her gynecologist all available results have been reviewed with pt and any available family. Pt understands sx, dx, and tx in ED. Care plan has been outlined and questions have been answered. Pt and any available family understands and agrees to need for admission to hospital for further tx not available in ED. Pt is ready for admission. Will contact her gynecologist at [facility] [place].  Written by Radha Freeman MD,  11:26 PM    PROGRESS NOTE:  The patient will be transferred to [facility] [place] in the care of Dr. Blue Lara. Written by Radha Freeman MD,  11:27 PM    .     .

## 2019-12-08 NOTE — PROGRESS NOTES
Bedside and Verbal shift change report given to [address] (oncoming nurse) by Anahi Reid (offgoing nurse). Report included the following information SBAR, Kardex, ED Summary, OR Summary, Procedure Summary, Intake/Output and MAR.

## 2019-12-13 ENCOUNTER — TELEPHONE (OUTPATIENT)
Dept: GYNECOLOGY | Age: 54
End: 2019-12-13

## 2019-12-13 NOTE — LETTER
NOTIFICATION OF RETURN TO WORK  
 
12/13/2019 3:06 PM 
 
Ms. Ghulam Motta [address] [facility] [address] [id number] [place] [place] To Whom It May Concern: 
 
Ghulam Motta is under the care of Umberto Barry . She is unable to work from 12/9/19 to 12/20/19. If there are questions or concerns please have the patient contact our office.  
 
Sincerely, 
 
 
Jmi Fernández MD

## 2020-01-14 ENCOUNTER — OFFICE VISIT (OUTPATIENT)
Dept: GYNECOLOGY | Age: 55
End: 2020-01-14

## 2020-01-14 VITALS
WEIGHT: 140 LBS | BODY MASS INDEX: 22.5 KG/M2 | HEART RATE: 52 BPM | SYSTOLIC BLOOD PRESSURE: 109 MMHG | HEIGHT: 66 IN | DIASTOLIC BLOOD PRESSURE: 68 MMHG

## 2020-01-14 DIAGNOSIS — T81.31XA DEHISCENCE OF VAGINAL CUFF, INITIAL ENCOUNTER: ICD-10-CM

## 2020-01-14 DIAGNOSIS — N94.89 PELVIC CONGESTION SYNDROME: Primary | ICD-10-CM

## 2020-01-14 NOTE — PROGRESS NOTES
[address], [address], [address] [facility]  P [phone]  F [phone]    Postoperative Office Note  Patient ID:  Name: Cadence Zamarripa  MRM: [id number]  : 1965/54 y.o. Date: 2020    Diagnosis:     ICD-10-CM ICD-9-CM    1. Pelvic congestion syndrome N94.89 625.5    2. Dehiscence of vaginal cuff, initial encounter T81.31XA 998.32        Problem List:   Patient Active Problem List   Diagnosis Code    Panic disorder without agoraphobia F41.0    Unspecified hypothyroidism E03.9    GERD (gastroesophageal reflux disease) K21.9    Injury of elbow, left S59.902A    Swelling of joint, elbow, left M25.422    Anxiety F41.9    TIA (transient ischemic attack) G45.9    Paresthesia of left arm R20.2    Chronic neck pain M54.2, G89.29    Burning sensation of feet R20.8    Pelvic congestion syndrome N94.89    S/P hysterectomy Z90.710    Vaginal cuff dehiscence T81.31XA       SUBJECTIVE:    Cadence Zamarripa is a 47 y.o. female who presents for followup postoperative care following TLH, BSO on 10/16/19. The patient's pathology revealed: FINAL PATHOLOGIC DIAGNOSIS   Uterus, cervix, fallopian tubes and ovaries, hysterectomy and bilateral salpingo-oophorectomy (54 g):   Cervix:   Unremarkable ectocervix and endocervix   Endometrium:   Inactive endometrium   Myometrium:   Prominent dilated vessels in an otherwise unremarkable myometrium   Bilateral fallopian tubes:   Bilateral unremarkable fallopian tubes   Bilateral ovaries:   Bilateral ovaries with physiologic changes       On 19 she experienced a complete vaginal cuff dehiscence following intercourse and underwent transvaginal repair of the vaginal cuff with Dr. Mc Garcia. Bowel was visible within the vagina but appeared healthy and was easily reduce for the repair. Currently she has no problems with eating, bowel movements, or voiding. Appetite is good. Eating a regular diet without difficulty. Bowel movements are regular. The patient is not having any pain. Medications:     Current Outpatient Medications on File Prior to Visit   Medication Sig Dispense Refill    fluticasone propionate (FLONASE ALLERGY RELIEF) 50 mcg/actuation nasal spray 2 Sprays by Both Nostrils route daily.  Biotin 2,500 mcg cap Take 2,500 mcg by mouth.  pantoprazole (PROTONIX) 40 mg tablet Take 40 mg by mouth daily.  CALCIUM PO Take 1,200 mg by mouth daily.  cholecalciferol, vitamin D3, (VITAMIN D3 PO) Take 50 mcg by mouth.  citalopram (CELEXA) 20 mg tablet Take 1 Tab by mouth daily. (Patient taking differently: Take 10 mg by mouth daily.) 90 Tab 1    clonazePAM (KLONOPIN) 0.5 mg tablet Take 1 Tab by mouth nightly as needed (anxiety). Max Daily Amount: 0.5 mg. 20 Tab 0    estradiol (VAGIFEM) 10 mcg tab vaginal tablet Insert 10 mcg into vagina every Monday and Thursday.  valACYclovir (VALTREX) 500 mg tablet Take 500 mg by mouth as needed. No current facility-administered medications on file prior to visit. Allergies: Allergies   Allergen Reactions    Naproxen Other (comments)     Abdominal Pain    Other Medication Other (comments)     Z-PACK: FELT WEAK FOR ONE WEEK AFTER TAKING AND VERY LIGHT HEADED.      Past Medical History:   Diagnosis Date    Anxiety     Arthritis     Cancer ([facility] [address]     BASAL CELL SKIN CANCER ON CHEST    Chronic neck pain 6/27/2015    Fatigue     GERD (gastroesophageal reflux disease) 3/12/2014    Headache     Hiatal hernia     Panic disorder     PUD (peptic ulcer disease)     TIA (transient ischemic attack) 6/27/2015     Past Surgical History:   Procedure Laterality Date    HX GYN      BTL    HX HEENT      HX WISDOM TEETH EXTRACTION       Social History     Socioeconomic History    Marital status:      Spouse name: Not on file    Number of children: Not on file    Years of education: Not on file    Highest education level: Not on file Occupational History    Not on file   Social Needs    Financial resource strain: Not on file    Food insecurity:     Worry: Not on file     Inability: Not on file    Transportation needs:     Medical: Not on file     Non-medical: Not on file   Tobacco Use    Smoking status: Never Smoker    Smokeless tobacco: Never Used   Substance and Sexual Activity    Alcohol use:  Yes     Alcohol/week: 2.0 standard drinks     Types: 2 Glasses of wine per week     Comment: occ    Drug use: No    Sexual activity: Yes     Partners: Male     Birth control/protection: Surgical   Lifestyle    Physical activity:     Days per week: Not on file     Minutes per session: Not on file    Stress: Not on file   Relationships    Social connections:     Talks on phone: Not on file     Gets together: Not on file     Attends Jewish service: Not on file     Active member of club or organization: Not on file     Attends meetings of clubs or organizations: Not on file     Relationship status: Not on file    Intimate partner violence:     Fear of current or ex partner: Not on file     Emotionally abused: Not on file     Physically abused: Not on file     Forced sexual activity: Not on file   Other Topics Concern     Service No    Blood Transfusions No    Caffeine Concern No    Occupational Exposure No    Hobby Hazards No    Sleep Concern Yes    Stress Concern Yes    Weight Concern Yes    Special Diet No    Back Care No    Exercise Yes    Bike Helmet No    Seat Belt Yes    Self-Exams Not Asked   Social History Narrative    Not on file       OBJECTIVE:    Vitals:   Vitals:    01/14/20 0935   BP: 109/68   Pulse: (!) 52   Weight: 140 lb (63.5 kg)   Height: 5' 5.98\" (1.676 m)     Physical Examination:     General:  alert, cooperative, no distress   Lungs: lungs clear to auscultation   Cardiac: Regular rate and rhythm   Abdomen: soft, bowel sounds active, non-tender  No CVA tenderness   Incision: healing well   Pelvic: External genitalia: normal general appearance  Urinary system: urethral meatus normal  Vaginal: normal without tenderness, induration or masses; vaginal cuff intact with sutures still present  Cervix: absent  Adnexa: removed surgically  Uterus: absent   Rectal: not done   Extremity:   extremities normal, atraumatic, no cyanosis or edema     IMPRESSION:    Markus Queen is doing well postoperatively following repair of a vaginal cuff dehiscence, approximately 6 weeks post-op following her TLH, BSO. She is now 4 weeks out from that repair. She is healing well. Medical problems:     Patient Active Problem List   Diagnosis Code    Panic disorder without agoraphobia F41.0    Unspecified hypothyroidism E03.9    GERD (gastroesophageal reflux disease) K21.9    Injury of elbow, left S59.902A    Swelling of joint, elbow, left M25.422    Anxiety F41.9    TIA (transient ischemic attack) G45.9    Paresthesia of left arm R20.2    Chronic neck pain M54.2, G89.29    Burning sensation of feet R20.8    Pelvic congestion syndrome N94.89    S/P hysterectomy Z90.710    Vaginal cuff dehiscence T81.31XA       PLAN:    The operative procedures and clinical results have been reviewed with the patient. Implications of diagnosis discussed at length. All questions answered. I recommend waiting another month or so before resuming intercourse. I will see the patient back prn. The patient is advised to call our office with any problems or concerns.     Rhys Muir MD  1/14/2020/11:21 AM

## 2020-01-17 ENCOUNTER — OFFICE VISIT (OUTPATIENT)
Dept: NEUROLOGY | Age: 55
End: 2020-01-17

## 2020-01-17 VITALS
OXYGEN SATURATION: 98 % | HEART RATE: 71 BPM | BODY MASS INDEX: 21.69 KG/M2 | HEIGHT: 66 IN | DIASTOLIC BLOOD PRESSURE: 78 MMHG | SYSTOLIC BLOOD PRESSURE: 110 MMHG | WEIGHT: 135 LBS

## 2020-01-17 DIAGNOSIS — R42 DIZZINESS: ICD-10-CM

## 2020-01-17 DIAGNOSIS — G43.919 INTRACTABLE MIGRAINE WITHOUT STATUS MIGRAINOSUS, UNSPECIFIED MIGRAINE TYPE: Primary | ICD-10-CM

## 2020-01-17 DIAGNOSIS — R51.9 NEW ONSET OF HEADACHES AFTER AGE 50: ICD-10-CM

## 2020-01-17 RX ORDER — BUTALBITAL, ACETAMINOPHEN AND CAFFEINE 50; 325; 40 MG/1; MG/1; MG/1
TABLET ORAL
Qty: 40 TAB | Refills: 5 | Status: SHIPPED | OUTPATIENT
Start: 2020-01-17 | End: 2021-01-18

## 2020-01-17 RX ORDER — DIAZEPAM 10 MG/1
TABLET ORAL
Qty: 1 TAB | Refills: 0 | Status: SHIPPED | OUTPATIENT
Start: 2020-01-17 | End: 2020-06-03

## 2020-01-17 NOTE — PROGRESS NOTES
Pt is a new pt referred by dr. Pond Staff for migraines and light headiness. Pt has been having constant migraines since June. Pt only tried sumatriptan.

## 2020-01-17 NOTE — PROGRESS NOTES
Shivani Lawson is a 47 y.o. female who presents with the following  Chief Complaint   Patient presents with    New Patient    Migraine       HPI      New patient comes in for a concern of headaches, dizziness. She states she has a history of headaches but since June things have been daily, chronic. 24 hours a day. She has noticed the headaches are usually located across the forehead and into the top of the head. The pain is a pressing, squeezing type of pain. She has also noticed some sharp, stabbing pains on the back side of the right side of her head at times. She has some noise and light sensitivity with these headaches. She is not sure what triggers them. Not sure where they came from. She is currently on Rommel Remak. She has failed Imitrex. It caused her significant side effects. Never tried Fioricet. No issues with work, memory. Sleep and appetite are well. No balance concerns. Steroids make her feel bad. Allergies   Allergen Reactions    Naproxen Other (comments)     Abdominal Pain    Other Medication Other (comments)     Z-PACK: FELT WEAK FOR ONE WEEK AFTER TAKING AND VERY LIGHT HEADED. Current Outpatient Medications   Medication Sig    butalbital-acetaminophen-caffeine (FIORICET, ESGIC) -40 mg per tablet Take 1-2 tablets by mouth every 8 hours PRN    erenumab-aooe (AIMOVIG AUTOINJECTOR) 70 mg/mL injection 1 mL by SubCUTAneous route every thirty (30) days.  diazePAM (VALIUM) 10 mg tablet Take tablet 30 - 45 minutes before MRI. Must have  drive home after.  fluticasone propionate (FLONASE ALLERGY RELIEF) 50 mcg/actuation nasal spray 2 Sprays by Both Nostrils route daily.  Biotin 2,500 mcg cap Take 2,500 mcg by mouth.  CALCIUM PO Take 1,200 mg by mouth daily.  cholecalciferol, vitamin D3, (VITAMIN D3 PO) Take 50 mcg by mouth.  citalopram (CELEXA) 20 mg tablet Take 1 Tab by mouth daily.  (Patient taking differently: Take 10 mg by mouth daily.)    clonazePAM (KLONOPIN) 0.5 mg tablet Take 1 Tab by mouth nightly as needed (anxiety). Max Daily Amount: 0.5 mg.    valACYclovir (VALTREX) 500 mg tablet Take 500 mg by mouth as needed. No current facility-administered medications for this visit. Social History     Tobacco Use   Smoking Status Never Smoker   Smokeless Tobacco Never Used       Past Medical History:   Diagnosis Date    Anxiety     Arthritis     Cancer ([facility] [address]     BASAL CELL SKIN CANCER ON CHEST    Chronic neck pain 6/27/2015    Fatigue     GERD (gastroesophageal reflux disease) 3/12/2014    Headache     Hiatal hernia     Panic disorder     PUD (peptic ulcer disease)     TIA (transient ischemic attack) 6/27/2015       Past Surgical History:   Procedure Laterality Date    HX GYN      BTL    HX HEENT      HX WISDOM TEETH EXTRACTION         Family History   Problem Relation Age of Onset    Cancer Father     Hypertension Mother     Heart Disease Paternal Grandfather     Anesth Problems Sister         \"OUT OF IT FOR ONE WEEK POST OP\"    Anesth Problems Other     Alcohol abuse Neg Hx     Arthritis-rheumatoid Neg Hx     Asthma Neg Hx     Bleeding Prob Neg Hx     Diabetes Neg Hx     Elevated Lipids Neg Hx     Headache Neg Hx     Lung Disease Neg Hx     Migraines Neg Hx     Psychiatric Disorder Neg Hx     Stroke Neg Hx     Mental Retardation Neg Hx        Social History     Socioeconomic History    Marital status:      Spouse name: Not on file    Number of children: Not on file    Years of education: Not on file    Highest education level: Not on file   Tobacco Use    Smoking status: Never Smoker    Smokeless tobacco: Never Used   Substance and Sexual Activity    Alcohol use:  Yes     Alcohol/week: 2.0 standard drinks     Types: 2 Glasses of wine per week     Comment: occ    Drug use: No    Sexual activity: Yes     Partners: Male     Birth control/protection: Surgical   Other Topics Concern     Service No    Blood Transfusions No    Caffeine Concern No    Occupational Exposure No    Hobby Hazards No    Sleep Concern Yes    Stress Concern Yes    Weight Concern Yes    Special Diet No    Back Care No    Exercise Yes    Bike Helmet No    Seat Belt Yes       Review of Systems   Eyes: Positive for blurred vision and photophobia. Negative for double vision. Respiratory: Negative for shortness of breath and wheezing. Cardiovascular: Negative for chest pain and palpitations. Gastrointestinal: Negative for nausea and vomiting. Musculoskeletal: Negative for falls. Neurological: Positive for headaches. Negative for dizziness, tingling, tremors, sensory change, seizures and loss of consciousness. Psychiatric/Behavioral: Negative for memory loss. Remainder of comprehensive review is negative. Physical Exam :    Visit Vitals  /78   Pulse 71   Ht 5' 5.98\" (1.676 m)   Wt 61.2 kg (135 lb)   LMP 08/19/2010   SpO2 98%   BMI 21.80 kg/m²       General: Well defined, nourished, and groomed individual in no acute distress.    Neck: Supple, nontender, no bruits, no pain with resistance to active range of motion.    Heart: Regular rate and rhythm, no murmurs, rub, or gallop. Normal S1S2. Lungs: Clear to auscultation bilaterally with equal chest expansion, no cough, no wheeze  Musculoskeletal: Extremities revealed no edema and had full range of motion of joints.    Psych: Good mood and bright affect    NEUROLOGICAL EXAMINATION:    Mental Status: Alert and oriented to person, place, and time    Cranial Nerves:    II, III, IV, VI: Visual acuity grossly intact. Visual fields are normal.    Pupils are equal, round, and reactive to light and accommodation.    Extra-ocular movements are full and fluid. Fundoscopic exam was benign, no ptosis or nystagmus.    V-XII: Hearing is grossly intact. Facial features are symmetric, with normal sensation and strength.  The palate rises symmetrically and the tongue protrudes midline. Sternocleidomastoids 5/5. Motor Examination: Normal tone, bulk, and strength, 5/5 muscle strength throughout. Coordination: Finger to nose was normal. No resting or intention tremor    Gait and Station: Steady while walking. Normal arm swing. No pronator drift. No muscle wasting or fasiculations noted. Reflexes: DTRs 2+ throughout. Results for orders placed or performed during the hospital encounter of 12/08/19   CBC W/O DIFF   Result Value Ref Range    WBC 11.7 (H) 3.6 - 11.0 K/uL    RBC 3.85 3.80 - 5.20 M/uL    HGB 11.8 11.5 - 16.0 g/dL    HCT 36.6 35.0 - 47.0 %    MCV 95.1 80.0 - 99.0 FL    MCH 30.6 26.0 - 34.0 PG    MCHC 32.2 30.0 - 36.5 g/dL    RDW 12.7 11.5 - 14.5 %    PLATELET 232 938 - 538 K/uL    MPV 9.8 8.9 - 12.9 FL    NRBC 0.0 0  WBC    ABSOLUTE NRBC 0.00 0.00 - 0.20 K/uL   METABOLIC PANEL, BASIC   Result Value Ref Range    Sodium 139 136 - 145 mmol/L    Potassium 3.8 3.5 - 5.1 mmol/L    Chloride 108 97 - 108 mmol/L    CO2 27 21 - 32 mmol/L    Anion gap 4 (L) 5 - 15 mmol/L    Glucose 156 (H) 65 - 100 mg/dL    BUN 10 6 - 20 MG/DL    Creatinine 0.69 0.55 - 1.02 MG/DL    BUN/Creatinine ratio 14 12 - 20      GFR est AA >60 >60 ml/min/1.73m2    GFR est non-AA >60 >60 ml/min/1.73m2    Calcium 8.7 8.5 - 10.1 MG/DL       Orders Placed This Encounter    MRI BRAIN W WO CONT     Standing Status:   Future     Standing Expiration Date:   2/17/2021     Order Specific Question:   Reason for Exam     Answer:   new onset headache, dizziness. Order Specific Question:   STAT Creatinine as indicated     Answer:    Yes    DUPLEX CAROTID BILATERAL     Standing Status:   Future     Number of Occurrences:   1     Standing Expiration Date:   7/17/2020    butalbital-acetaminophen-caffeine (FIORICET, ESGIC) -40 mg per tablet     Sig: Take 1-2 tablets by mouth every 8 hours PRN     Dispense:  40 Tab     Refill:  5    erenumab-aooe (AIMOVIG AUTOINJECTOR) 70 mg/mL injection     Si mL by SubCUTAneous route every thirty (30) days. Dispense:  1 Each     Refill:  5    diazePAM (VALIUM) 10 mg tablet     Sig: Take tablet 30 - 45 minutes before MRI. Must have  drive home after. Dispense:  1 Tab     Refill:  0       1. Intractable migraine without status migrainosus, unspecified migraine type    2. New onset of headaches after age 55    4. Dizziness        We discussed treatment and testing. Failed SSRI and can not take BP medications. Taken AED also. Initiate Aimovig 70 mg every 30 days for migraine prevention as discussed. Try Fioricet for PRN rescue. Consider steroid if needed low dose to break cycle but will hold for now. Doppler to look at stenosis. MRI brain to rule out stroke, tumor, lesion. FU after.              This note will not be viewable in [employer]

## 2020-02-03 ENCOUNTER — HOSPITAL ENCOUNTER (OUTPATIENT)
Dept: MRI IMAGING | Age: 55
Discharge: HOME | End: 2020-02-03
Attending: NURSE PRACTITIONER
Payer: COMMERCIAL

## 2020-02-03 DIAGNOSIS — G43.919 INTRACTABLE MIGRAINE WITHOUT STATUS MIGRAINOSUS, UNSPECIFIED MIGRAINE TYPE: ICD-10-CM

## 2020-02-03 DIAGNOSIS — R42 DIZZINESS: ICD-10-CM

## 2020-02-03 DIAGNOSIS — R51.9 NEW ONSET OF HEADACHES AFTER AGE 50: ICD-10-CM

## 2020-02-03 PROCEDURE — 74011250636 HC RX REV CODE- 250/636: Performed by: NURSE PRACTITIONER

## 2020-02-03 PROCEDURE — 70553 MRI BRAIN STEM W/O & W/DYE: CPT

## 2020-02-03 PROCEDURE — A9575 INJ GADOTERATE MEGLUMI 0.1ML: HCPCS | Performed by: NURSE PRACTITIONER

## 2020-02-03 RX ORDER — GADOTERATE MEGLUMINE 376.9 MG/ML
12 INJECTION INTRAVENOUS
Status: COMPLETED | OUTPATIENT
Start: 2020-02-03 | End: 2020-02-03

## 2020-02-03 RX ADMIN — GADOTERATE MEGLUMINE 12 ML: 376.9 INJECTION INTRAVENOUS at 17:47

## 2020-02-27 ENCOUNTER — TELEPHONE (OUTPATIENT)
Dept: FAMILY MEDICINE CLINIC | Age: 55
End: 2020-02-27

## 2020-02-28 ENCOUNTER — OFFICE VISIT (OUTPATIENT)
Dept: NEUROLOGY | Age: 55
End: 2020-02-28

## 2020-02-28 VITALS
SYSTOLIC BLOOD PRESSURE: 122 MMHG | HEIGHT: 66 IN | DIASTOLIC BLOOD PRESSURE: 72 MMHG | BODY MASS INDEX: 21.8 KG/M2 | RESPIRATION RATE: 20 BRPM

## 2020-02-28 RX ORDER — DIHYDROERGOTAMINE MESYLATE 4 MG/ML
SPRAY NASAL
Qty: 2 BOTTLE | Refills: 0 | Status: SHIPPED | COMMUNITY
Start: 2020-02-28 | End: 2020-06-03

## 2020-02-28 NOTE — PROGRESS NOTES
Test Results:    MRI of the brain   Doppler     Headaches- the shot helps for about 1 week but she uses the fiorcet   Most of the time 2 in the am, if its been the 8 hrs she will take another one if its not bad she might take tylenol but she has to have something else by the evening time

## 2020-02-28 NOTE — PROGRESS NOTES
Donavon Funes is a 47 y.o. female who presents with the following  Chief Complaint   Patient presents with    Follow-up     test results        HPI     Patient comes in for a concern of chronic migraine. She states she has a history of headaches but since June things have been daily, chronic. 24 hours a day. She has noticed the headaches are usually located across the forehead and into the top of the head. The pain is a pressing, squeezing type of pain. She has also noticed some sharp, stabbing pains on the back side of the right side of her head at times. She has some noise and light sensitivity with these headaches. She is not sure what triggers them. Not sure where they came from. She is currently on Blenda [place]. Failed CGRP and can not take BP medications due to hypotension. She has also failed AED due to side effects. Failed Triptan with bad side effects. She has been using Fioricet and this has helped some. She thinks the high pitched sound in her work car is causing migraines. When at home and not in car she does not have migraines. Allergies   Allergen Reactions    Naproxen Other (comments)     Abdominal Pain    Other Medication Other (comments)     Z-PACK: FELT WEAK FOR ONE WEEK AFTER TAKING AND VERY LIGHT HEADED. Current Outpatient Medications   Medication Sig    onabotulinumtoxinA (BOTOX) 200 unit injection Inject 155 units IM into 31 FDA indicated and approved sites in the head, face, neck every 3 months for chronic migraine.  erenumab-aooe (AIMOVIG AUTOINJECTOR) 70 mg/mL injection 1 mL by SubCUTAneous route once for 1 dose.  dihydroergotamine (MIGRANAL) 0.5 mg/pump act. (4 mg/mL) nasal spray 1 spray in both nostrils at headache onset. Repeat in 15 minutes x 1 dose.  OTHER Ubrelvy 100 mg take 1 tab on onset of migraines may repeat in 2 hours if needed. Max dose 2 tabs in 24hours.     butalbital-acetaminophen-caffeine ([place]) -40 mg per tablet Take 1-2 tablets by mouth every 8 hours PRN    erenumab-aooe (AIMOVIG AUTOINJECTOR) 70 mg/mL injection 1 mL by SubCUTAneous route every thirty (30) days.  fluticasone propionate (FLONASE ALLERGY RELIEF) 50 mcg/actuation nasal spray 2 Sprays by Both Nostrils route daily.  Biotin 2,500 mcg cap Take 2,500 mcg by mouth.  CALCIUM PO Take 1,200 mg by mouth daily.  cholecalciferol, vitamin D3, (VITAMIN D3 PO) Take 50 mcg by mouth.  citalopram (CELEXA) 20 mg tablet Take 1 Tab by mouth daily. (Patient taking differently: Take 10 mg by mouth daily.)    clonazePAM (KLONOPIN) 0.5 mg tablet Take 1 Tab by mouth nightly as needed (anxiety). Max Daily Amount: 0.5 mg.    valACYclovir (VALTREX) 500 mg tablet Take 500 mg by mouth as needed.  diazePAM (VALIUM) 10 mg tablet Take tablet 30 - 45 minutes before MRI. Must have  drive home after. No current facility-administered medications for this visit.         Social History     Tobacco Use   Smoking Status Never Smoker   Smokeless Tobacco Never Used       Past Medical History:   Diagnosis Date    Anxiety     Arthritis     Cancer ([facility] [address]     BASAL CELL SKIN CANCER ON CHEST    Chronic neck pain 6/27/2015    Fatigue     GERD (gastroesophageal reflux disease) 3/12/2014    Headache     Hiatal hernia     Panic disorder     PUD (peptic ulcer disease)     TIA (transient ischemic attack) 6/27/2015       Past Surgical History:   Procedure Laterality Date    HX GYN      BTL    HX HEENT      HX WISDOM TEETH EXTRACTION         Family History   Problem Relation Age of Onset    Cancer Father     Hypertension Mother     Heart Disease Paternal Grandfather     Anesth Problems Sister         \"OUT OF IT FOR ONE WEEK POST OP\"    Anesth Problems Other     Alcohol abuse Neg Hx     Arthritis-rheumatoid Neg Hx     Asthma Neg Hx     Bleeding Prob Neg Hx     Diabetes Neg Hx     Elevated Lipids Neg Hx     Headache Neg Hx     Lung Disease Neg Hx     Migraines Neg Hx     Psychiatric Disorder Neg Hx     Stroke Neg Hx     Mental Retardation Neg Hx        Social History     Socioeconomic History    Marital status:      Spouse name: Not on file    Number of children: Not on file    Years of education: Not on file    Highest education level: Not on file   Tobacco Use    Smoking status: Never Smoker    Smokeless tobacco: Never Used   Substance and Sexual Activity    Alcohol use: Yes     Alcohol/week: 2.0 standard drinks     Types: 2 Glasses of wine per week     Comment: occ    Drug use: No    Sexual activity: Yes     Partners: Male     Birth control/protection: Surgical   Other Topics Concern     Service No    Blood Transfusions No    Caffeine Concern No    Occupational Exposure No    Hobby Hazards No    Sleep Concern Yes    Stress Concern Yes    Weight Concern Yes    Special Diet No    Back Care No    Exercise Yes    Bike Helmet No    Seat Belt Yes       Review of Systems   Eyes: Positive for blurred vision and photophobia. Gastrointestinal: Positive for nausea. Negative for vomiting. Neurological: Positive for headaches. Negative for dizziness, tingling and tremors. Remainder of comprehensive review is negative.      Physical Exam :    Visit Vitals  /72   Resp 20   Ht 5' 5.98\" (1.676 m)   LMP 08/19/2010   BMI 21.80 kg/m²         Results for orders placed or performed during the hospital encounter of 12/08/19   CBC W/O DIFF   Result Value Ref Range    WBC 11.7 (H) 3.6 - 11.0 K/uL    RBC 3.85 3.80 - 5.20 M/uL    HGB 11.8 11.5 - 16.0 g/dL    HCT 36.6 35.0 - 47.0 %    MCV 95.1 80.0 - 99.0 FL    MCH 30.6 26.0 - 34.0 PG    MCHC 32.2 30.0 - 36.5 g/dL    RDW 12.7 11.5 - 14.5 %    PLATELET 232 740 - 630 K/uL    MPV 9.8 8.9 - 12.9 FL    NRBC 0.0 0  WBC    ABSOLUTE NRBC 0.00 0.00 - 3.49 K/uL   METABOLIC PANEL, BASIC   Result Value Ref Range    Sodium 139 136 - 145 mmol/L    Potassium 3.8 3.5 - 5.1 mmol/L    Chloride 108 97 - 108 mmol/L    CO2 27 21 - 32 mmol/L    Anion gap 4 (L) 5 - 15 mmol/L    Glucose 156 (H) 65 - 100 mg/dL    BUN 10 6 - 20 MG/DL    Creatinine 0.69 0.55 - 1.02 MG/DL    BUN/Creatinine ratio 14 12 - 20      GFR est AA >60 >60 ml/min/1.73m2    GFR est non-AA >60 >60 ml/min/1.73m2    Calcium 8.7 8.5 - 10.1 MG/DL       Orders Placed This Encounter    REFERRAL TO NEUROLOGY     Referral Priority:   Routine     Referral Type:   Consultation     Referral Reason:   Specialty Services Required     Referred to Provider:   Shazia Damian NP     Number of Visits Requested:   1    onabotulinumtoxinA (BOTOX) 200 unit injection     Sig: Inject 155 units IM into 31 FDA indicated and approved sites in the head, face, neck every 3 months for chronic migraine. Dispense:  1 Vial     Refill:  5    erenumab-aooe (AIMOVIG AUTOINJECTOR) 70 mg/mL injection     Si mL by SubCUTAneous route once for 1 dose. Dispense:  1 Each     Refill:  0     Order Specific Question:   Expiration Date     Answer:   2021     Order Specific Question:   Lot#     Answer:   [id number]     Order Specific Question:        Answer:   [employer]    dihydroergotamine (MIGRANAL) 0.5 mg/pump act. (4 mg/mL) nasal spray     Si spray in both nostrils at headache onset. Repeat in 15 minutes x 1 dose. Dispense:  2 Bottle     Refill:  0     Order Specific Question:   Expiration Date     Answer:   2021     Order Specific Question:   Lot#     Answer:   [id number]     Order Specific Question:        Answer:   [employer]       1. Chronic migraine        discussed MRI, doppler. No concerns at this time. Failed AED, SSRI and can not take BP medications. Also failed CGRP   She is off all these. Initiate Botox to help as FDA indicated and approved migraine prevention for chronic migraine. Discussed side effects and gave information on this. Continue to try Ubrelvy but half tablets due to sleepiness. Also can try Migranal for PRN rescue.              This note will not be viewable in [employer]

## 2020-03-16 DIAGNOSIS — F41.0 PANIC DISORDER WITHOUT AGORAPHOBIA: ICD-10-CM

## 2020-03-16 DIAGNOSIS — F41.9 ANXIETY: ICD-10-CM

## 2020-03-17 RX ORDER — CITALOPRAM 20 MG/1
20 TABLET, FILM COATED ORAL DAILY
Qty: 90 TAB | Refills: 1 | Status: SHIPPED | OUTPATIENT
Start: 2020-03-17 | End: 2021-04-14 | Stop reason: SDUPTHER

## 2020-03-17 RX ORDER — CLONAZEPAM 0.5 MG/1
0.5 TABLET ORAL
Qty: 20 TAB | Refills: 0 | Status: SHIPPED | OUTPATIENT
Start: 2020-03-17 | End: 2022-02-27 | Stop reason: ALTCHOICE

## 2020-03-26 RX ORDER — ONABOTULINUMTOXINA 200 [USP'U]/1
INJECTION, POWDER, LYOPHILIZED, FOR SOLUTION INTRADERMAL; INTRAMUSCULAR
Qty: 1 VIAL | Refills: 5 | Status: SHIPPED | OUTPATIENT
Start: 2020-03-26 | End: 2020-03-31 | Stop reason: SDUPTHER

## 2020-03-26 NOTE — TELEPHONE ENCOUNTER
Botox order sent to [facility] as pt will be starting this per LOV note with Aristides Salas in Feb

## 2020-03-31 RX ORDER — ONABOTULINUMTOXINA 200 [USP'U]/1
INJECTION, POWDER, LYOPHILIZED, FOR SOLUTION INTRADERMAL; INTRAMUSCULAR
Qty: 1 VIAL | Refills: 5 | Status: SHIPPED | OUTPATIENT
Start: 2020-03-31 | End: 2022-07-19

## 2020-04-02 ENCOUNTER — TELEPHONE (OUTPATIENT)
Dept: NEUROLOGY | Age: 55
End: 2020-04-02

## 2020-04-02 NOTE — TELEPHONE ENCOUNTER
----- Message from Juliet Foster sent at 4/2/2020  2:27 PM EDT -----  Regarding: Dr. Wilfredo High like a call back regarding pre authorization for pt's Botox(could not verify pt's number) Contact is [phone]

## 2020-04-02 NOTE — TELEPHONE ENCOUNTER
Called and spoke with [facility] they need to hear from the pt to have her benefits reviewed. Pt was told to call me to let me know everything is good.

## 2020-04-03 NOTE — TELEPHONE ENCOUNTER
----- Message from Hay Benjamin sent at 4/3/2020 10:51 AM EDT -----  Regarding: no tahiraaujosiah/ refill  General Message/Vendor Calls    Caller's first and last name: pt      Reason for call: pt stated she got the approval for her botox       Callback required yes/no and why: no      Best contact number(s): [phone]      Details to clarify the request:      Rizwana Beatty

## 2020-04-08 NOTE — TELEPHONE ENCOUNTER
Called and spoke with Gabino Felipe pt needs a PA done through her pharmaceutical and not the [facility]. PA number to call is [phone]. Rep states this is the fastest way.

## 2020-04-22 ENCOUNTER — OFFICE VISIT (OUTPATIENT)
Dept: NEUROLOGY | Age: 55
End: 2020-04-22

## 2020-04-22 VITALS — TEMPERATURE: 98.4 F

## 2020-04-22 RX ORDER — ONABOTULINUMTOXINA 200 [USP'U]/1
INJECTION, POWDER, LYOPHILIZED, FOR SOLUTION INTRADERMAL; INTRAMUSCULAR
Qty: 1 VIAL | Refills: 0 | Status: SHIPPED | COMMUNITY
Start: 2020-04-22 | End: 2020-06-03

## 2020-04-22 RX ORDER — DICLOFENAC POTASSIUM 50 MG/1
POWDER, FOR SOLUTION ORAL
Qty: 5 PACKET | Refills: 0 | Status: SHIPPED | COMMUNITY
Start: 2020-04-22 | End: 2020-07-21 | Stop reason: SDUPTHER

## 2020-04-22 NOTE — PROGRESS NOTES
[facility]  OFFICE PROCEDURE PROGRESS NOTE        Chart reviewed for the following:   Rufus DREW NP, have reviewed the History, Physical and updated the Allergic reactions for Jeana BOX [facility] performed immediately prior to start of procedure:   Rufus DREW NP, have performed the following reviews on Saeed Gill prior to the start of the procedure:            * Patient was identified by name and date of birth   * Agreement on procedure being performed was verified  * Risks and Benefits explained to the patient  * Procedure site verified and marked as necessary  * Patient was positioned for comfort  * Consent was signed and verified     Time: 1100      Date of procedure: 4/22/2020    Procedure performed by:  Ana Brown NP    Provider assisted by: None    Patient assisted by: None    How tolerated by patient: tolerated the procedure well with no complications    Post Procedural Pain Scale: 2 - Hurts Little Bit    Comments: None    Botox Injection Note       Indication: patient has chronic recurrent migraine, has greater than 15 migraine headaches per month, has failed two or more categories of preventatives    Procedure:   Botox concentration: 200 units in 4 ml of preservative-free normal saline. 31 sites injections, distribution as follow      Units/site  Sites Sides Subtotal    Procerus 5 1 1 5    5 1 2 10   Frontalis 5 2 2 20   Temporalis 5 4 2 40   Occipitalis 5 3 2 30   Upper cervical paraspinalis 5 2 2 20   Trapezius 5 3 2 30         200 units Botox were reconstituted, 155 units injected as above and the remainder was unavoidably wasted.      Patient tolerated procedure well.     _____________________________   Robe Marquez NP .

## 2020-04-24 ENCOUNTER — TELEPHONE (OUTPATIENT)
Dept: CARDIOLOGY CLINIC | Age: 55
End: 2020-04-24

## 2020-04-24 NOTE — TELEPHONE ENCOUNTER
Ambrose Delacruz stated has had left side tingling that travels down left arm and hadn for last 3 weeks. That couple of days changed to across shoulder blades to right side. Sensation will wake her up. Patient wanted to let Dr. France Pereyra has chronic migraines and nephrologist stated might be a complicated migraine symptom. Denies SOB or CP.     Dr. Enrique Zuluaga advise

## 2020-04-24 NOTE — TELEPHONE ENCOUNTER
Patient is calling to schedule an appointment w/ Dr. Chela Lam for tingling in left arm an \"weird sensation\" in her chest. She denied any symptoms at this time but would like to see him soon. Please advise.     Phone #: [phone]  Thanks

## 2020-04-27 ENCOUNTER — VIRTUAL VISIT (OUTPATIENT)
Dept: CARDIOLOGY CLINIC | Age: 55
End: 2020-04-27

## 2020-04-27 DIAGNOSIS — R42 DIZZINESS: ICD-10-CM

## 2020-04-27 DIAGNOSIS — R20.0 NUMBNESS AND TINGLING IN LEFT HAND: ICD-10-CM

## 2020-04-27 DIAGNOSIS — I95.1 ORTHOSTATIC HYPOTENSION: Primary | ICD-10-CM

## 2020-04-27 DIAGNOSIS — R20.2 NUMBNESS AND TINGLING IN LEFT HAND: ICD-10-CM

## 2020-04-27 NOTE — PROGRESS NOTES
Reta Santiago MD    [address] [place], [address] [facility]    Office [phone],ALBIN [phone]  Pager [phone]  to the emergency declaration under the [facility] waiver authority and the [employer] Act, this Virtual  Visit was conducted, with patient's consent, to reduce the patient's risk of exposure to COVID-19 and provide continuity of care for an established patient. Services were provided through a video synchronous discussion virtually to substitute for in-person clinic visit      Jud Spangler is a 47 y.o. female - VV    Primary care physician:  Natalia Rubio MD    Patient Active Problem List   Diagnosis Code    Panic disorder without agoraphobia F41.0    Unspecified hypothyroidism E03.9    GERD (gastroesophageal reflux disease) K21.9    Injury of elbow, left S59.902A    Swelling of joint, elbow, left M25.422    Anxiety F41.9    TIA (transient ischemic attack) G45.9    Paresthesia of left arm R20.2    Chronic neck pain M54.2, G89.29    Burning sensation of feet R20.8    Pelvic congestion syndrome N94.89    S/P hysterectomy Z90.710    Vaginal cuff dehiscence T81.31XA       Dear Ms Judi Mckay had the pleasure of seeing Ms Ilia Huston in the office today -VV.       Assessment:       Dizziness  Anxiety/Dizziness  Probable orthostatic Hypotension  Has been diagnosed with migraine    Plan:         Continue florinef-currently taking half tablet daily. Patient has previously tried wearing compression stockings but could not tolerate wearing them. Keep hydrated  Aggressive CV risk factor modification. Follow-up in 6 months or earlier as needed    Patient understands the plan. All questions were answered to the patient's satisfaction.     Medication Side Effects and Warnings were discussed with patient: yes  Patient Labs were reviewed and or requested:  yes  Patient Past Records were reviewed and or requested: yes    I appreciate the opportunity to be involved in [place]. See note below for details. Please do not hesitate to contact us with questions or concerns. Devora Suresh MD    Cardiac Testing/ Procedures: A. Cardiac Cath/PCI:     B. ECHO/NIECY:      C. StressNuclear/Stress ECHO/Stress test: Stress test - nml 3/2019     D. Vascular:     E. EP: Event monitor ( Dr Owen Guzman) - 3/2019 - Nml     F. Miscellaneous:    Office notes of Dr. Melonie Florez seen by him April 11, 2019    Treadmill stress test 3/21/19-12 minutes. Normal.    E cardio event monitor 3/21/2019 to 4/19/2019-minimum heart rate 42 bpm maximum heart rate 145 bpm.  No A. fib. Sinus rhythm, sinus tachycardia, PAC    Stress echocardiogram 3/26/2012-9 minutes 30 seconds. Normal  Holter monitor 3/26/2012-average heart rate was 66 bpm.  Minimum heart rate 41. Maximum heart rate 145 bpm.  Rare PACs. Rare PVCs. Subjective:  Dennie Molly is a 47 y.o. female who returns for follow up . Patient states that she has been doing well from the dizziness standpoint. She had actually come off the Florinef but then her blood pressure started trending low she was symptomatic so she started back Florinef half tablet daily. No chest pain, dyspnea, syncope, swelling lower extremities. Has been diagnosed with migraine. Occasionally has left hand numbness and tingling. On Florinef. ( Initial visit - 6/27/19 48 yr old with hx of intermittent dizziness for the past few months. Recently because of the dizziness she had a fall. No loss of consciousness. Has had a cardiac evaluation in March which was negative. Recently was started on Florinef because of orthostatic hypotension by PCP.   She has taken 2 doses.)    ROS:  (bold if positive, if negative)             Medications before admission:    Current Outpatient Medications   Medication Sig Dispense    Diclofenac Potassium (Cambia) 50 mg pwpk 1 packet on headache onset. Mix in 1-2 oz of water repeat in 2 hours if needed. 2 packs in 24 hours only. 5 Packet    onabotulinumtoxinA (Botox) 200 unit injection Inject 155 units IM into 31 FDA approved sites head/neck every 12 weeks 1 Vial    onabotulinumtoxinA (Botox) 200 unit injection Inject 155 units IM into 31 FDA indicated and approved sites in the head, face, neck every 3 months for chronic migraine. 1 Vial    citalopram (CELEXA) 20 mg tablet Take 1 Tab by mouth daily. 90 Tab    clonazePAM (KlonoPIN) 0.5 mg tablet Take 1 Tab by mouth nightly as needed (anxiety). Max Daily Amount: 0.5 mg. Indications: panic disorder 20 Tab    dihydroergotamine (MIGRANAL) 0.5 mg/pump act. (4 mg/mL) nasal spray 1 spray in both nostrils at headache onset. Repeat in 15 minutes x 1 dose. 2 Bottle    OTHER Ubrelvy 100 mg take 1 tab on onset of migraines may repeat in 2 hours if needed. Max dose 2 tabs in 24hours. 2 Tab    butalbital-acetaminophen-caffeine (FIORICET, ESGIC) -40 mg per tablet Take 1-2 tablets by mouth every 8 hours PRN 40 Tab    erenumab-aooe (AIMOVIG AUTOINJECTOR) 70 mg/mL injection 1 mL by SubCUTAneous route every thirty (30) days. 1 Each    diazePAM (VALIUM) 10 mg tablet Take tablet 30 - 45 minutes before MRI. Must have  drive home after. 1 Tab    fluticasone propionate (FLONASE ALLERGY RELIEF) 50 mcg/actuation nasal spray 2 Sprays by Both Nostrils route daily.  Biotin 2,500 mcg cap Take 2,500 mcg by mouth.  CALCIUM PO Take 1,200 mg by mouth daily.  cholecalciferol, vitamin D3, (VITAMIN D3 PO) Take 50 mcg by mouth.  valACYclovir (VALTREX) 500 mg tablet Take 500 mg by mouth as needed. No current facility-administered medications for this visit. Family History of CAD:    No    Social History:  Current  Smoker No    Physical Exam:         VVisit    Gen: A and O times 3; Not in acute distress;  Normal affect  HEENT - Pupils appear normal  Skin - no erythema/cyanosis  LE : no significant edema      EKG:       LABS:        Lab Results   Component Value Date/Time    WBC 11.7 (H) 12/08/2019 10:02 AM    HGB 11.8 12/08/2019 10:02 AM    HCT 36.6 12/08/2019 10:02 AM    PLATELET 121 34/67/6523 10:02 AM     Lab Results   Component Value Date/Time    Sodium 139 12/08/2019 10:02 AM    Potassium 3.8 12/08/2019 10:02 AM    Chloride 108 12/08/2019 10:02 AM    CO2 27 12/08/2019 10:02 AM    Anion gap 4 (L) 12/08/2019 10:02 AM    Glucose 156 (H) 12/08/2019 10:02 AM    BUN 10 12/08/2019 10:02 AM    Creatinine 0.69 12/08/2019 10:02 AM    BUN/Creatinine ratio 14 12/08/2019 10:02 AM    GFR est AA >60 12/08/2019 10:02 AM    GFR est non-AA >60 12/08/2019 10:02 AM    Calcium 8.7 12/08/2019 10:02 AM       Lab Results   Component Value Date/Time    aPTT 23.9 12/07/2019 08:17 PM     Lab Results   Component Value Date/Time    INR 1.0 12/07/2019 08:17 PM    INR 1.0 06/27/2015 12:13 AM    Prothrombin time 10.3 12/07/2019 08:17 PM    Prothrombin time 9.5 06/27/2015 12:13 AM     No components found for: Praveen Dorman MD

## 2020-06-03 ENCOUNTER — VIRTUAL VISIT (OUTPATIENT)
Dept: NEUROLOGY | Age: 55
End: 2020-06-03

## 2020-06-03 VITALS — BODY MASS INDEX: 22.47 KG/M2 | HEIGHT: 65 IN

## 2020-06-03 DIAGNOSIS — G43.919 INTRACTABLE MIGRAINE WITHOUT STATUS MIGRAINOSUS, UNSPECIFIED MIGRAINE TYPE: Primary | ICD-10-CM

## 2020-06-03 NOTE — PROGRESS NOTES
Naif Long is a 47 y.o. female who was seen by synchronous (real-time) audio-video technology on 6/3/2020. Consent: Naif Long, who was seen by synchronous (real-time) audio-video technology, and/or her healthcare decision maker, is aware that this patient-initiated, Telehealth encounter on 6/3/2020 is a billable service, with coverage as determined by her insurance carrier. She is aware that she may receive a bill and has provided verbal consent to proceed: Yes. Patient comes on virtual video to discuss botox. She has noticed a significant positive change with the botox. She has only had a few headaches since the treatment. She feels about 3 since the botox. They are not even classified as migraine anymore. She has noticed they are not as intense, not lasting as long. Has tried [place] and it worked well. Fioricet as a backup. Doing great. off Aimovig. Assessment & Plan:     I spent at least 15 minutes on this visit with this established patient. Subjective:   Naif Long is a 47 y.o. female who was seen for Follow-up and Migraine      Prior to Admission medications    Medication Sig Start Date End Date Taking? Authorizing Provider   Diclofenac Potassium (Cambia) 50 mg pwpk 1 packet on headache onset. Mix in 1-2 oz of water repeat in 2 hours if needed. 2 packs in 24 hours only. 4/22/20  Yes Marivel Phillips, NP   onabotulinumtoxinA (Botox) 200 unit injection Inject 155 units IM into 31 FDA indicated and approved sites in the head, face, neck every 3 months for chronic migraine. 3/31/20  Yes Josué Pavon MD   citalopram (CELEXA) 20 mg tablet Take 1 Tab by mouth daily. 3/17/20  Yes Deja Jeffers MD   clonazePAM (KlonoPIN) 0.5 mg tablet Take 1 Tab by mouth nightly as needed (anxiety). Max Daily Amount: 0.5 mg. Indications: panic disorder 3/17/20  Yes Deja Jeffers MD   OTHER Ubrelvy 100 mg take 1 tab on onset of migraines may repeat in 2 hours if needed. Max dose 2 tabs in 24hours. 2/10/20  Yes Radha Phillips NP   butalbital-acetaminophen-caffeine (FIORICET, ESGIC) -40 mg per tablet Take 1-2 tablets by mouth every 8 hours PRN 1/17/20  Yes Radha Phillips NP   fluticasone propionate (FLONASE ALLERGY RELIEF) 50 mcg/actuation nasal spray 2 Sprays by Both Nostrils route daily. Yes Provider, Historical   Biotin 2,500 mcg cap Take 2,500 mcg by mouth. Yes Provider, Historical   CALCIUM PO Take 1,200 mg by mouth daily. Yes Provider, Historical   cholecalciferol, vitamin D3, (VITAMIN D3 PO) Take 50 mcg by mouth. Yes Provider, Historical   valACYclovir (VALTREX) 500 mg tablet Take 500 mg by mouth as needed. Yes Other, MD Dolores   onabotulinumtoxinA (Botox) 200 unit injection Inject 155 units IM into 31 FDA approved sites head/neck every 12 weeks 4/22/20 6/3/20  Radha Phillips NP   dihydroergotamine (MIGRANAL) 0.5 mg/pump act. (4 mg/mL) nasal spray 1 spray in both nostrils at headache onset. Repeat in 15 minutes x 1 dose. 2/28/20 6/3/20  Radha Phillips NP   erenumab-aooe (AIMOVIG AUTOINJECTOR) 70 mg/mL injection 1 mL by SubCUTAneous route every thirty (30) days. 1/17/20 6/3/20  Radha Phillips NP   diazePAM (VALIUM) 10 mg tablet Take tablet 30 - 45 minutes before MRI. Must have  drive home after. 1/17/20 6/3/20  Radha Phillips NP     Allergies   Allergen Reactions    Naproxen Other (comments)     Abdominal Pain    Other Medication Other (comments)     Z-PACK: FELT WEAK FOR ONE WEEK AFTER TAKING AND VERY LIGHT HEADED.        Patient Active Problem List   Diagnosis Code    Panic disorder without agoraphobia F41.0    Unspecified hypothyroidism E03.9    GERD (gastroesophageal reflux disease) K21.9    Injury of elbow, left S59.902A    Swelling of joint, elbow, left M25.422    Anxiety F41.9    TIA (transient ischemic attack) G45.9    Paresthesia of left arm R20.2    Chronic neck pain M54.2, G89.29    Burning sensation of feet R20.8    Pelvic congestion syndrome N94.89    S/P hysterectomy Z90.710    Vaginal cuff dehiscence T81.31XA     Patient Active Problem List    Diagnosis Date Noted    Vaginal cuff dehiscence 12/08/2019    S/P hysterectomy 10/16/2019    Pelvic congestion syndrome 09/26/2019    Burning sensation of feet 10/31/2016    TIA (transient ischemic attack) 06/27/2015    Paresthesia of left arm 06/27/2015    Chronic neck pain 06/27/2015    Anxiety 05/21/2015    Injury of elbow, left 01/02/2015    Swelling of joint, elbow, left 01/02/2015    GERD (gastroesophageal reflux disease) 03/12/2014    Unspecified hypothyroidism 12/19/2012    Panic disorder without agoraphobia 05/09/2012     Current Outpatient Medications   Medication Sig Dispense Refill    Diclofenac Potassium (Cambia) 50 mg pwpk 1 packet on headache onset. Mix in 1-2 oz of water repeat in 2 hours if needed. 2 packs in 24 hours only. 5 Packet 0    onabotulinumtoxinA (Botox) 200 unit injection Inject 155 units IM into 31 FDA indicated and approved sites in the head, face, neck every 3 months for chronic migraine. 1 Vial 5    citalopram (CELEXA) 20 mg tablet Take 1 Tab by mouth daily. 90 Tab 1    clonazePAM (KlonoPIN) 0.5 mg tablet Take 1 Tab by mouth nightly as needed (anxiety). Max Daily Amount: 0.5 mg. Indications: panic disorder 20 Tab 0    OTHER Ubrelvy 100 mg take 1 tab on onset of migraines may repeat in 2 hours if needed. Max dose 2 tabs in 24hours. 2 Tab 0    butalbital-acetaminophen-caffeine (FIORICET, ESGIC) -40 mg per tablet Take 1-2 tablets by mouth every 8 hours PRN 40 Tab 5    fluticasone propionate (FLONASE ALLERGY RELIEF) 50 mcg/actuation nasal spray 2 Sprays by Both Nostrils route daily.  Biotin 2,500 mcg cap Take 2,500 mcg by mouth.  CALCIUM PO Take 1,200 mg by mouth daily.  cholecalciferol, vitamin D3, (VITAMIN D3 PO) Take 50 mcg by mouth.       valACYclovir (VALTREX) 500 mg tablet Take 500 mg by mouth as needed. Allergies   Allergen Reactions    Naproxen Other (comments)     Abdominal Pain    Other Medication Other (comments)     Z-PACK: FELT WEAK FOR ONE WEEK AFTER TAKING AND VERY LIGHT HEADED. Past Medical History:   Diagnosis Date    Anxiety     Arthritis     Cancer ([address]     BASAL CELL SKIN CANCER ON CHEST    Chronic neck pain 6/27/2015    Fatigue     GERD (gastroesophageal reflux disease) 3/12/2014    Headache     Hiatal hernia     Panic disorder     PUD (peptic ulcer disease)     TIA (transient ischemic attack) 6/27/2015     Past Surgical History:   Procedure Laterality Date    HX GYN      BTL    HX HEENT      HX WISDOM TEETH EXTRACTION       Family History   Problem Relation Age of Onset    Cancer Father     Hypertension Mother     Heart Disease Paternal Grandfather     Anesth Problems Sister         \"OUT OF IT FOR ONE WEEK POST OP\"    Anesth Problems Other     Alcohol abuse Neg Hx     Arthritis-rheumatoid Neg Hx     Asthma Neg Hx     Bleeding Prob Neg Hx     Diabetes Neg Hx     Elevated Lipids Neg Hx     Headache Neg Hx     Lung Disease Neg Hx     Migraines Neg Hx     Psychiatric Disorder Neg Hx     Stroke Neg Hx     Mental Retardation Neg Hx      Social History     Tobacco Use    Smoking status: Never Smoker    Smokeless tobacco: Never Used   Substance Use Topics    Alcohol use: Yes     Alcohol/week: 2.0 standard drinks     Types: 2 Glasses of wine per week     Comment: occ       Review of Systems   Eyes: Positive for blurred vision and photophobia. Negative for double vision. Gastrointestinal: Positive for nausea. Neurological: Positive for headaches. Negative for dizziness, tingling, tremors, sensory change, seizures and loss of consciousness.        Objective:   Vital Signs: (As obtained by patient/caregiver at home)  Visit Vitals   5' 5\" (1.651 m)   LMP 08/19/2010   BMI 22.47 kg/m²        [INSTRUCTIONS:  \"[x]\" Indicates a positive item  \"[]\" Indicates a negative item  -- DELETE ALL ITEMS NOT EXAMINED]    Constitutional: [x] Appears well-developed and well-nourished [x] No apparent distress      [] Abnormal -     Mental status: [x] Alert and awake  [x] Oriented to person/place/time [x] Able to follow commands    [] Abnormal -     Eyes:   EOM    [x]  Normal    [] Abnormal -   Sclera  [x]  Normal    [] Abnormal -          Discharge [x]  None visible   [] Abnormal -     HENT: [x] Normocephalic, atraumatic  [] Abnormal -   [x] Mouth/Throat: Mucous membranes are moist    External Ears [x] Normal  [] Abnormal -    Neck: [x] No visualized mass [] Abnormal -     Pulmonary/Chest: [x] Respiratory effort normal   [x] No visualized signs of difficulty breathing or respiratory distress        [] Abnormal -      Musculoskeletal:   [x] Normal gait with no signs of ataxia         [x] Normal range of motion of neck        [] Abnormal -     Neurological:        [x] No Facial Asymmetry (Cranial nerve 7 motor function) (limited exam due to video visit)          [x] No gaze palsy        [] Abnormal -          Skin:        [x] No significant exanthematous lesions or discoloration noted on facial skin         [] Abnormal -            Psychiatric:       [x] Normal Affect [] Abnormal -        [x] No Hallucinations    Other pertinent observable physical exam findings:-    Botox has really cut down the migraines significantly to about 1 a month, maybe less. From 15 a month   Has had about 3 HA days since botox. Significant change since last visit. Keep Cambia for PRN for now. Fioricet for PRN also. We discussed the expected course, resolution and complications of the diagnosis(es) in detail. Medication risks, benefits, costs, interactions, and alternatives were discussed as indicated. I advised her to contact the office if her condition worsens, changes or fails to improve as anticipated. She expressed understanding with the diagnosis(es) and plan.        Deanne Daly [facility] is a 47 y.o. female who was evaluated by a video visit encounter for concerns as above. Patient identification was verified prior to start of the visit. A caregiver was present when appropriate. Due to this being a TeleHealth encounter (During BGNBX-69 public health emergency), evaluation of the following organ systems was limited: Vitals/Constitutional/EENT/Resp/CV/GI//MS/Neuro/Skin/Heme-Lymph-Imm. Pursuant to the emergency declaration under the [facility] waiver authority and the [employer] Act, this Virtual  Visit was conducted, with patient's (and/or legal guardian's) consent, to reduce the patient's risk of exposure to COVID-19 and provide necessary medical care. Services were provided through a video synchronous discussion virtually to substitute for in-person clinic visit. Patient and provider were located at their individual homes.       Enma Naik NP

## 2020-07-21 ENCOUNTER — OFFICE VISIT (OUTPATIENT)
Dept: NEUROLOGY | Age: 55
End: 2020-07-21

## 2020-07-21 VITALS — TEMPERATURE: 97.5 F

## 2020-07-21 RX ORDER — DICLOFENAC POTASSIUM 50 MG/1
POWDER, FOR SOLUTION ORAL
Qty: 9 PACKET | Refills: 5 | Status: SHIPPED | OUTPATIENT
Start: 2020-07-21 | End: 2021-01-19

## 2020-07-21 RX ORDER — ONABOTULINUMTOXINA 200 [USP'U]/1
INJECTION, POWDER, LYOPHILIZED, FOR SOLUTION INTRADERMAL; INTRAMUSCULAR
Qty: 1 VIAL | Refills: 0 | Status: SHIPPED | COMMUNITY
Start: 2020-07-21 | End: 2021-02-12 | Stop reason: SDUPTHER

## 2020-07-21 RX ORDER — DICLOFENAC POTASSIUM 50 MG/1
1 POWDER, FOR SOLUTION ORAL AS NEEDED
Qty: 6 PACKET | Refills: 0 | Status: SHIPPED | COMMUNITY
Start: 2020-07-21 | End: 2021-01-19

## 2020-07-21 NOTE — PROGRESS NOTES
[facility]  OFFICE PROCEDURE PROGRESS NOTE        Chart reviewed for the following:   I, [de-identified] M TERRELL Phillips, have reviewed the History, Physical and updated the Allergic reactions for Jeana  [facility] performed immediately prior to start of procedure:   I, [de-identified] M TERRELL Phillips, have performed the following reviews on Stevphen Salvia prior to the start of the procedure:            * Patient was identified by name and date of birth   * Agreement on procedure being performed was verified  * Risks and Benefits explained to the patient  * Procedure site verified and marked as necessary  * Patient was positioned for comfort  * Consent was signed and verified     Time: 1030      Date of procedure: 7/21/2020    Procedure performed by:  Hal Chavarria NP    Provider assisted by: None    Patient assisted by: None    How tolerated by patient: tolerated the procedure well with no complications    Post Procedural Pain Scale: 2 - Hurts Little Bit    Comments: None      Botox Injection Note       Indication: patient has chronic recurrent migraine, has 7-10 less migraine days per month with botox injections    Procedure:   Botox concentration: 200 units in 4 ml of preservative-free normal saline. 31 sites injections, distribution as follow      Units/site  Sites Sides Subtotal    Procerus 5 1 1 5    5 1 2 10   Frontalis 5 2 2 20   Temporalis 5 4 2 40   Occipitalis 5 3 2 30   Upper cervical paraspinalis 5 2 2 20   Trapezius 5 3 2 30         200 units Botox were reconstituted, 155 units injected as above and the remainder was unavoidably wasted. Patient tolerated procedure well.      Mima Parikh NP

## 2020-07-23 ENCOUNTER — TELEPHONE (OUTPATIENT)
Dept: NEUROLOGY | Age: 55
End: 2020-07-23

## 2020-07-23 NOTE — TELEPHONE ENCOUNTER
Received patient's Botox today from [facility]. [phone]  RX # [id number]  With 3 refills remaining.

## 2020-08-14 ENCOUNTER — TELEPHONE (OUTPATIENT)
Dept: NEUROLOGY | Age: 55
End: 2020-08-14

## 2020-08-14 NOTE — TELEPHONE ENCOUNTER
Pt has had a migraine everyday since 07/21/2020 the botox is not working what can we do next [phone]

## 2020-09-30 ENCOUNTER — DOCUMENTATION ONLY (OUTPATIENT)
Dept: NEUROLOGY | Age: 55
End: 2020-09-30

## 2020-09-30 NOTE — PROGRESS NOTES
Botox 200 U/vial rec'd    Pharmacy:  [facility]  Rx number:  [id number]  Lot: [id number]  Exp: 05/2023  NDC: 5716-2343-88

## 2020-10-16 RX ORDER — ESTRADIOL 10 UG/1
10 INSERT VAGINAL DAILY
Qty: 30 TAB | Refills: 12 | OUTPATIENT
Start: 2020-10-16 | End: 2021-09-20

## 2020-10-16 RX ORDER — VALACYCLOVIR HYDROCHLORIDE 500 MG/1
500 TABLET, FILM COATED ORAL AS NEEDED
Qty: 30 TAB | Refills: 12 | OUTPATIENT
Start: 2020-10-16

## 2020-10-16 NOTE — TELEPHONE ENCOUNTER
Per patient request and vorb from  the below prescriptions have been phoned in to her [facility]. Requested Prescriptions     Pending Prescriptions Disp Refills    valACYclovir (Valtrex) 500 mg tablet 30 Tab 12     Sig: Take 1 Tab by mouth as needed.  estradioL (VAGIFEM) 10 mcg tab vaginal tablet 30 Tab 12     Sig: Insert 1 Tab into vagina daily.

## 2020-10-20 ENCOUNTER — OFFICE VISIT (OUTPATIENT)
Dept: NEUROLOGY | Age: 55
End: 2020-10-20
Payer: COMMERCIAL

## 2020-10-20 VITALS — TEMPERATURE: 97.7 F

## 2020-10-20 PROCEDURE — 64615 CHEMODENERV MUSC MIGRAINE: CPT | Performed by: NURSE PRACTITIONER

## 2020-10-20 RX ORDER — ONABOTULINUMTOXINA 200 [USP'U]/1
INJECTION, POWDER, LYOPHILIZED, FOR SOLUTION INTRADERMAL; INTRAMUSCULAR
Qty: 1 VIAL | Refills: 0 | Status: SHIPPED | COMMUNITY
Start: 2020-10-20 | End: 2021-02-12 | Stop reason: SDUPTHER

## 2020-10-20 NOTE — PROGRESS NOTES
[facility]  OFFICE PROCEDURE PROGRESS NOTE        Chart reviewed for the following:   I, [de-identified] M TERRELL Phillips, have reviewed the History, Physical and updated the Allergic reactions for Jeana  [facility] performed immediately prior to start of procedure:   I, [de-identified] M TERRELL Phillips, have performed the following reviews on Bernard Au prior to the start of the procedure:            * Patient was identified by name and date of birth   * Agreement on procedure being performed was verified  * Risks and Benefits explained to the patient  * Procedure site verified and marked as necessary  * Patient was positioned for comfort  * Consent was signed and verified     Time: 1000      Date of procedure: 10/20/2020    Procedure performed by:  Gilmer García NP    Provider assisted by: None    Patient assisted by: None    How tolerated by patient: tolerated the procedure well with no complications    Post Procedural Pain Scale: 2 - Hurts Little Bit    Comments: None      Botox Injection Note       Indication: patient has chronic recurrent migraine, has 7-10 less migraine days per month with botox injections    Procedure:   Botox concentration: 200 units in 4 ml of preservative-free normal saline. 31 sites injections, distribution as follow      Units/site  Sites Sides Subtotal    Procerus 5 1 1 5    5 1 2 10   Frontalis 5 2 2 20   Temporalis 5 4 2 40   Occipitalis 5 3 2 30   Upper cervical paraspinalis 5 2 2 20   Trapezius 5 3 2 30         200 units Botox were reconstituted, 155 units injected as above and the remainder was unavoidably wasted. Patient tolerated procedure well.      Gill Quiroga NP

## 2020-12-22 ENCOUNTER — TELEPHONE (OUTPATIENT)
Dept: NEUROLOGY | Age: 55
End: 2020-12-22

## 2021-01-18 DIAGNOSIS — G43.919 INTRACTABLE MIGRAINE WITHOUT STATUS MIGRAINOSUS, UNSPECIFIED MIGRAINE TYPE: ICD-10-CM

## 2021-01-18 RX ORDER — BUTALBITAL, ACETAMINOPHEN AND CAFFEINE 50; 325; 40 MG/1; MG/1; MG/1
TABLET ORAL
Qty: 40 TAB | Refills: 0 | Status: SHIPPED | OUTPATIENT
Start: 2021-01-18 | End: 2021-01-19 | Stop reason: SDUPTHER

## 2021-01-19 ENCOUNTER — OFFICE VISIT (OUTPATIENT)
Dept: NEUROLOGY | Age: 56
End: 2021-01-19
Payer: COMMERCIAL

## 2021-01-19 DIAGNOSIS — G43.919 INTRACTABLE MIGRAINE WITHOUT STATUS MIGRAINOSUS, UNSPECIFIED MIGRAINE TYPE: ICD-10-CM

## 2021-01-19 PROCEDURE — 64615 CHEMODENERV MUSC MIGRAINE: CPT | Performed by: NURSE PRACTITIONER

## 2021-01-19 RX ORDER — ONABOTULINUMTOXINA 200 [USP'U]/1
INJECTION, POWDER, LYOPHILIZED, FOR SOLUTION INTRADERMAL; INTRAMUSCULAR
Qty: 1 VIAL | Refills: 0 | Status: SHIPPED | COMMUNITY
Start: 2021-01-19 | End: 2021-02-12 | Stop reason: SDUPTHER

## 2021-01-19 RX ORDER — BUTALBITAL, ACETAMINOPHEN AND CAFFEINE 50; 325; 40 MG/1; MG/1; MG/1
TABLET ORAL
Qty: 40 TAB | Refills: 5 | Status: SHIPPED | OUTPATIENT
Start: 2021-01-19

## 2021-01-19 NOTE — PROGRESS NOTES
[facility]  OFFICE PROCEDURE PROGRESS NOTE        Chart reviewed for the following:   I, [de-identified] M TERRELL Phillips, have reviewed the History, Physical and updated the Allergic reactions for Jeana [facility] performed immediately prior to start of procedure:   I, [de-identified] M TERRELL Phillips, have performed the following reviews on Anne Roman prior to the start of the procedure:            * Patient was identified by name and date of birth   * Agreement on procedure being performed was verified  * Risks and Benefits explained to the patient  * Procedure site verified and marked as necessary  * Patient was positioned for comfort  * Consent was signed and verified     Time: 1010      Date of procedure: 1/19/2021    Procedure performed by:  Karma Lorenzana NP    Provider assisted by: None    Patient assisted by: None    How tolerated by patient: tolerated the procedure well with no complications    Post Procedural Pain Scale: 2 - Hurts Little Bit    Comments: None      Botox Injection Note       Indication: patient has chronic recurrent migraine, has 7-10 less migraine days per month with botox injections    Procedure:   Botox concentration: 200 units in 4 ml of preservative-free normal saline. 31 sites injections, distribution as follow      Units/site  Sites Sides Subtotal    Procerus 5 1 1 5    5 1 2 10   Frontalis 5 2 2 20   Temporalis 5 4 2 40   Occipitalis 5 3 2 30   Upper cervical paraspinalis 5 2 2 20   Trapezius 5 3 2 30         200 units Botox were reconstituted, 155 units injected as above and the remainder was unavoidably wasted. Patient tolerated procedure well.      Chidi Saldaña NP

## 2021-02-12 ENCOUNTER — TELEPHONE (OUTPATIENT)
Dept: CARDIOLOGY CLINIC | Age: 56
End: 2021-02-12

## 2021-02-12 ENCOUNTER — OFFICE VISIT (OUTPATIENT)
Dept: CARDIOLOGY CLINIC | Age: 56
End: 2021-02-12
Payer: COMMERCIAL

## 2021-02-12 VITALS
BODY MASS INDEX: 23.32 KG/M2 | OXYGEN SATURATION: 96 % | HEIGHT: 65 IN | DIASTOLIC BLOOD PRESSURE: 78 MMHG | SYSTOLIC BLOOD PRESSURE: 116 MMHG | HEART RATE: 72 BPM | WEIGHT: 140 LBS

## 2021-02-12 DIAGNOSIS — R00.2 PALPITATIONS: Primary | ICD-10-CM

## 2021-02-12 DIAGNOSIS — R07.9 CHEST PAIN, UNSPECIFIED TYPE: ICD-10-CM

## 2021-02-12 DIAGNOSIS — I95.1 ORTHOSTASIS: ICD-10-CM

## 2021-02-12 PROCEDURE — 93000 ELECTROCARDIOGRAM COMPLETE: CPT | Performed by: INTERNAL MEDICINE

## 2021-02-12 PROCEDURE — 99214 OFFICE O/P EST MOD 30 MIN: CPT | Performed by: INTERNAL MEDICINE

## 2021-02-12 NOTE — PROGRESS NOTES
Yen Franklin is a 54 y.o. female    Chief Complaint   Patient presents with    Follow-up     numbness & tingling in left hand    Dizziness       Chest pain patient states some random CP with left arm pain    SOB No    Dizziness patient states dizziness at random times     Swelling No    Refills No    Visit Vitals  /78 (BP 1 Location: Left upper arm, BP Patient Position: Sitting)   Pulse 72   Ht 5' 5\" (1.651 m)   Wt 140 lb (63.5 kg)   LMP 08/19/2010   SpO2 96%   BMI 23.30 kg/m²       1. Have you been to the ER, urgent care clinic since your last visit? Hospitalized since your last visit? No    2. Have you seen or consulted any other health care providers outside of the [address] [place] [address] since your last visit? Include any pap smears or colon screening.   No

## 2021-02-12 NOTE — PROGRESS NOTES
Alondra Maza MD    [address] [facility], [address] [facility]    Office [phone],RBD [phone]      Eric Hairston is a 54 y.o. female is here for f/u visit. CC - as in     Dear Ms Sarah Kelley had the pleasure of seeing Ms Yoana Davis in the office today.        Assessment:     Palpitatons  Dizziness  Hx of Anxiety  ? Orthostatic Hypotension     Plan:      Event monitor  Echocardiogram  Continue florinef  ( Takes 0.1 mg 1/2 tab daily (  Compression stockings- had pain while wearing it - does not use it anymore  Keep hydrated  Ca score  Aggressive CV risk factor modification. F/u based on cardiac w/u/6 months    Patient understands the plan. All questions were answered to the patient's satisfaction. Medication Side Effects and Warnings were discussed with patient: yes  Patient Labs were reviewed and or requested:  yes  Patient Past Records were reviewed and or requested: yes    I appreciate the opportunity to be involved in [place]. See note below for details. Please do not hesitate to contact us with questions or concerns. Alondra Maza MD    Cardiac Testing/ Procedures: A. Cardiac Cath/PCI:     B. ECHO/NIECY:      C. StressNuclear/Stress ECHO/Stress test: Stress test - nml 3/2019     D. Vascular:     E. EP: Event monitor ( Dr Celina Navarro) - 3/2019 - Nml     F. Miscellaneous:  Office notes of Dr. Gin Thomas seen by him April 11, 2019    Treadmill stress test 3/21/19-12 minutes. Normal.    E cardio event monitor 3/21/2019 to 4/19/2019-minimum heart rate 42 bpm maximum heart rate 145 bpm.  No A. fib. Sinus rhythm, sinus tachycardia, PAC    Stress echocardiogram 3/26/2012-9 minutes 30 seconds. Normal  Holter monitor 3/26/2012-average heart rate was 66 bpm.  Minimum heart rate 41. Maximum heart rate 145 bpm.  Rare PACs. Rare PVCs. Subjective:  Eric Hairston is a 54 y.o. female who returns for follow up . Patient states she hast had episodes of palpitations. Recently she had one episode when she had palpitations and she had chest tightness radiating to both upper extremity. She was going to bed and was not anxious. Does not think it was a panic attack. No diaphoresis. Resolved after a few minutes. No swelling lower extremities. States that half a tablet of Florinef is working well for her to keep her blood pressure up. ( Initial visit - 6/27/19 48 yr old with hx of intermittent dizziness for the past few months. Recently because of the dizziness she had a fall. No loss of consciousness. Has had a cardiac evaluation in March which was negative. Recently was started on Florinef because of orthostatic hypotension by PCP. She has taken 2 doses.)    ROS:  (bold if positive, if negative)             Medications before admission:    Current Outpatient Medications   Medication Sig Dispense    OTHER Herbal supplement for mood -macka     butalbital-acetaminophen-caffeine (FIORICET, ESGIC) -40 mg per tablet TAKE 1 TO 2 TABLETS BY MOUTH EVERY 8 HOURS AS NEEDED 40 Tab    valACYclovir (Valtrex) 500 mg tablet Take 1 Tab by mouth as needed (take one as needed). 30 Tab    estradioL (VAGIFEM) 10 mcg tab vaginal tablet Insert 1 Tab into vagina daily. 30 Tab    onabotulinumtoxinA (Botox) 200 unit injection Inject 155 units IM into 31 FDA indicated and approved sites in the head, face, neck every 3 months for chronic migraine. 1 Vial    citalopram (CELEXA) 20 mg tablet Take 1 Tab by mouth daily. 90 Tab    clonazePAM (KlonoPIN) 0.5 mg tablet Take 1 Tab by mouth nightly as needed (anxiety). Max Daily Amount: 0.5 mg. Indications: panic disorder 20 Tab    fluticasone propionate (FLONASE ALLERGY RELIEF) 50 mcg/actuation nasal spray 2 Sprays by Both Nostrils route daily.  Biotin 2,500 mcg cap Take 2,500 mcg by mouth.  CALCIUM PO Take 1,200 mg by mouth daily.  cholecalciferol, vitamin D3, (VITAMIN D3 PO) Take 50 mcg by mouth.       No current facility-administered medications for this visit. Family History of CAD:    No    Social History:  Current  Smoker No    Physical Exam:  Visit Vitals  /78 (BP 1 Location: Left upper arm, BP Patient Position: Sitting)   Pulse 72   Ht 5' 5\" (1.651 m)   Wt 140 lb (63.5 kg)   LMP 08/19/2010   SpO2 96%   BMI 23.30 kg/m²          Gen: Well-developed, well-nourished, in no acute distress  Neck: Supple,No JVD, No Carotid Bruit,   Resp: No accessory muscle use, Clear breath sounds, No rales or rhonchi  Card: Regular Rate,Rythm,Normal S1, S2, No murmurs, rubs or gallop. No thrills.    Abd:  Soft, non-tender, non-distended,BS+,   MSK: No cyanosis  Skin: No rashes    Neuro: moving all four extremities , follows commands appropriately  Psych:  Good insight, oriented to person, place , alert, Nml Affect  LE: No edema    EKG: SBrady/NSST      LABS:        Lab Results   Component Value Date/Time    WBC 11.7 (H) 12/08/2019 10:02 AM    HGB 11.8 12/08/2019 10:02 AM    HCT 36.6 12/08/2019 10:02 AM    PLATELET 883 28/49/5145 10:02 AM     Lab Results   Component Value Date/Time    Sodium 139 12/08/2019 10:02 AM    Potassium 3.8 12/08/2019 10:02 AM    Chloride 108 12/08/2019 10:02 AM    CO2 27 12/08/2019 10:02 AM    Anion gap 4 (L) 12/08/2019 10:02 AM    Glucose 156 (H) 12/08/2019 10:02 AM    BUN 10 12/08/2019 10:02 AM    Creatinine 0.69 12/08/2019 10:02 AM    BUN/Creatinine ratio 14 12/08/2019 10:02 AM    GFR est AA >60 12/08/2019 10:02 AM    GFR est non-AA >60 12/08/2019 10:02 AM    Calcium 8.7 12/08/2019 10:02 AM       Lab Results   Component Value Date/Time    aPTT 23.9 12/07/2019 08:17 PM     Lab Results   Component Value Date/Time    INR 1.0 12/07/2019 08:17 PM    INR 1.0 06/27/2015 12:13 AM    Prothrombin time 10.3 12/07/2019 08:17 PM    Prothrombin time 9.5 06/27/2015 12:13 AM     No components found for: Polo Dewitt MD

## 2021-02-12 NOTE — LETTER
2/12/2021    Patient: Cordella Danger   YOB: 1965   Date of Visit: 2/12/2021     Simon García MD  [facility] [address]  [address] [facility]    Dear Simon García MD,      Thank you for referring Ms. Gonzalo Berry to [facility] for evaluation. My notes for this consultation are attached. If you have questions, please do not hesitate to call me. I look forward to following your patient along with you.       Sincerely,    Cortney Cassidy MD

## 2021-02-19 RX ORDER — RIMEGEPANT SULFATE 75 MG/75MG
TABLET, ORALLY DISINTEGRATING ORAL
Qty: 8 TAB | Refills: 5 | Status: SHIPPED | OUTPATIENT
Start: 2021-02-19 | End: 2021-09-20

## 2021-02-19 RX ORDER — DICLOFENAC POTASSIUM 50 MG/1
POWDER, FOR SOLUTION ORAL
Qty: 9 PACKET | Refills: 5 | Status: SHIPPED | OUTPATIENT
Start: 2021-02-19 | End: 2021-08-20 | Stop reason: SDUPTHER

## 2021-03-16 ENCOUNTER — ANCILLARY PROCEDURE (OUTPATIENT)
Dept: CARDIOLOGY CLINIC | Age: 56
End: 2021-03-16
Payer: COMMERCIAL

## 2021-03-16 VITALS
DIASTOLIC BLOOD PRESSURE: 68 MMHG | HEIGHT: 65 IN | WEIGHT: 140 LBS | SYSTOLIC BLOOD PRESSURE: 120 MMHG | BODY MASS INDEX: 23.32 KG/M2

## 2021-03-16 DIAGNOSIS — R07.9 CHEST PAIN, UNSPECIFIED TYPE: ICD-10-CM

## 2021-03-16 PROCEDURE — 93306 TTE W/DOPPLER COMPLETE: CPT | Performed by: INTERNAL MEDICINE

## 2021-03-17 ENCOUNTER — TELEPHONE (OUTPATIENT)
Dept: CARDIOLOGY CLINIC | Age: 56
End: 2021-03-17

## 2021-03-17 LAB
ECHO AO ASC DIAM: 3.76 CM
ECHO AO ROOT DIAM: 3.2 CM
ECHO AV AREA PEAK VELOCITY: 2.86 CM2
ECHO AV AREA PLAN: 3.19 CM2
ECHO AV AREA VTI: 2.77 CM2
ECHO AV AREA/BSA PEAK VELOCITY: 1.7 CM2/M2
ECHO AV AREA/BSA VTI: 1.6 CM2/M2
ECHO AV MEAN GRADIENT: 6.07 MMHG
ECHO AV PEAK GRADIENT: 12.29 MMHG
ECHO AV PEAK VELOCITY: 175.27 CM/S
ECHO AV VTI: 35.88 CM
ECHO EST RA PRESSURE: 3 MMHG
ECHO IVC PROX: 1.73 CM
ECHO LA AREA 4C: 17.73 CM2
ECHO LA MAJOR AXIS: 4.12 CM
ECHO LA MINOR AXIS: 2.42 CM
ECHO LA VOL 2C: 45.01 ML (ref 22–52)
ECHO LA VOL 4C: 44.43 ML (ref 22–52)
ECHO LA VOL BP: 46.88 ML (ref 22–52)
ECHO LA VOL/BSA BIPLANE: 27.58 ML/M2 (ref 16–28)
ECHO LA VOLUME INDEX A2C: 26.48 ML/M2 (ref 16–28)
ECHO LA VOLUME INDEX A4C: 26.14 ML/M2 (ref 16–28)
ECHO LV E' LATERAL VELOCITY: 9.18 CM/S
ECHO LV E' SEPTAL VELOCITY: 7.3 CM/S
ECHO LV EDV A2C: 106.41 ML
ECHO LV EDV A4C: 113.78 ML
ECHO LV EDV BP: 113.21 ML (ref 56–104)
ECHO LV EDV INDEX A4C: 66.9 ML/M2
ECHO LV EDV INDEX BP: 66.6 ML/M2
ECHO LV EDV NDEX A2C: 62.6 ML/M2
ECHO LV EJECTION FRACTION A2C: 61 PERCENT
ECHO LV EJECTION FRACTION A4C: 66 PERCENT
ECHO LV EJECTION FRACTION BIPLANE: 64.8 PERCENT (ref 55–100)
ECHO LV ESV A2C: 41.45 ML
ECHO LV ESV A4C: 38.12 ML
ECHO LV ESV BP: 39.81 ML (ref 19–49)
ECHO LV ESV INDEX A2C: 24.4 ML/M2
ECHO LV ESV INDEX A4C: 22.4 ML/M2
ECHO LV ESV INDEX BP: 23.4 ML/M2
ECHO LV INTERNAL DIMENSION DIASTOLIC: 4.81 CM (ref 3.9–5.3)
ECHO LV INTERNAL DIMENSION SYSTOLIC: 3.08 CM
ECHO LV IVSD: 0.71 CM (ref 0.6–0.9)
ECHO LV MASS 2D: 111.1 G (ref 67–162)
ECHO LV MASS INDEX 2D: 65.4 G/M2 (ref 43–95)
ECHO LV POSTERIOR WALL DIASTOLIC: 0.73 CM (ref 0.6–0.9)
ECHO LVOT DIAM: 1.98 CM
ECHO LVOT PEAK GRADIENT: 10.52 MMHG
ECHO LVOT PEAK VELOCITY: 162.17 CM/S
ECHO LVOT SV: 99.6 ML
ECHO LVOT VTI: 32.22 CM
ECHO MV A VELOCITY: 78.42 CM/S
ECHO MV E DECELERATION TIME (DT): 212.97 MS
ECHO MV E VELOCITY: 85 CM/S
ECHO MV E/A RATIO: 1.08
ECHO MV E/E' LATERAL: 9.26
ECHO MV E/E' RATIO (AVERAGED): 10.45
ECHO MV E/E' SEPTAL: 11.64
ECHO MV MAX VELOCITY: 89.71 CM/S
ECHO MV MEAN GRADIENT: 1.55 MMHG
ECHO MV PEAK GRADIENT: 3.22 MMHG
ECHO MV PRESSURE HALF TIME (PHT): 61.76 MS
ECHO MV VTI: 23.09 CM
ECHO RA AREA 4C: 15.1 CM2
ECHO RIGHT VENTRICULAR SYSTOLIC PRESSURE (RVSP): 28.32 MMHG
ECHO RV INTERNAL DIMENSION: 4 CM
ECHO RV TAPSE: 2.65 CM (ref 1.5–2)
ECHO TV REGURGITANT MAX VELOCITY: 251.6 CM/S
ECHO TV REGURGITANT PEAK GRADIENT: 25.32 MMHG

## 2021-03-17 NOTE — TELEPHONE ENCOUNTER
----- Message from Cortney Cassidy MD sent at 3/17/2021 10:59 AM EDT -----  Echo-normal pump function, mild valvular abnormalities. Will discuss further on follow-up visit.

## 2021-03-17 NOTE — TELEPHONE ENCOUNTER
Verified patient with two patient identifiers. Called patient and informed her of Echo test result- normal heart function per  and will explain more in details on next office visit.

## 2021-04-14 ENCOUNTER — TELEPHONE (OUTPATIENT)
Dept: CARDIOLOGY CLINIC | Age: 56
End: 2021-04-14

## 2021-04-14 ENCOUNTER — OFFICE VISIT (OUTPATIENT)
Dept: FAMILY MEDICINE CLINIC | Age: 56
End: 2021-04-14
Payer: COMMERCIAL

## 2021-04-14 ENCOUNTER — HOSPITAL ENCOUNTER (OUTPATIENT)
Dept: GENERAL RADIOLOGY | Age: 56
Discharge: HOME | End: 2021-04-14
Payer: COMMERCIAL

## 2021-04-14 VITALS
DIASTOLIC BLOOD PRESSURE: 82 MMHG | SYSTOLIC BLOOD PRESSURE: 136 MMHG | WEIGHT: 136 LBS | HEIGHT: 65 IN | RESPIRATION RATE: 18 BRPM | HEART RATE: 66 BPM | OXYGEN SATURATION: 98 % | BODY MASS INDEX: 22.66 KG/M2 | TEMPERATURE: 98.2 F

## 2021-04-14 DIAGNOSIS — M54.2 NECK PAIN: ICD-10-CM

## 2021-04-14 DIAGNOSIS — F41.9 ANXIETY: ICD-10-CM

## 2021-04-14 DIAGNOSIS — M25.511 ACUTE PAIN OF RIGHT SHOULDER: ICD-10-CM

## 2021-04-14 DIAGNOSIS — M62.838 MUSCLE SPASM: ICD-10-CM

## 2021-04-14 DIAGNOSIS — M54.9 UPPER BACK PAIN ON RIGHT SIDE: Primary | ICD-10-CM

## 2021-04-14 DIAGNOSIS — M62.838 MUSCLE SPASM OF RIGHT LOWER EXTREMITY: ICD-10-CM

## 2021-04-14 DIAGNOSIS — M54.9 UPPER BACK PAIN ON RIGHT SIDE: ICD-10-CM

## 2021-04-14 PROCEDURE — 72072 X-RAY EXAM THORAC SPINE 3VWS: CPT

## 2021-04-14 PROCEDURE — 73030 X-RAY EXAM OF SHOULDER: CPT

## 2021-04-14 PROCEDURE — 72050 X-RAY EXAM NECK SPINE 4/5VWS: CPT

## 2021-04-14 PROCEDURE — 99213 OFFICE O/P EST LOW 20 MIN: CPT | Performed by: INTERNAL MEDICINE

## 2021-04-14 RX ORDER — FLUDROCORTISONE ACETATE 0.1 MG/1
0.05 TABLET ORAL DAILY
COMMUNITY
Start: 2021-01-18

## 2021-04-14 RX ORDER — TIZANIDINE 2 MG/1
TABLET ORAL
Qty: 30 TAB | Refills: 1 | Status: SHIPPED | OUTPATIENT
Start: 2021-04-14 | End: 2021-09-20

## 2021-04-14 RX ORDER — CITALOPRAM 20 MG/1
20 TABLET, FILM COATED ORAL DAILY
Qty: 90 TAB | Refills: 1 | Status: SHIPPED | OUTPATIENT
Start: 2021-04-14 | End: 2022-02-15 | Stop reason: SDUPTHER

## 2021-04-14 NOTE — TELEPHONE ENCOUNTER
Patient would like to know if she needs to have a follow up visit after preventis? Please advise.     Phone: [phone]

## 2021-04-14 NOTE — PROGRESS NOTES
Identified pt with two pt identifiers(name and ). Chief Complaint   Patient presents with    Back Pain     x 6 months        Health Maintenance Due   Topic    Hepatitis C Screening     Shingrix Vaccine Age 49> (1 of 2)    Colorectal Cancer Screening Combo     Lipid Screen     DTaP/Tdap/Td series (2 - Td)    Breast Cancer Screen Mammogram        Wt Readings from Last 3 Encounters:   21 136 lb (61.7 kg)   21 140 lb (63.5 kg)   21 140 lb (63.5 kg)     Temp Readings from Last 3 Encounters:   21 98.2 °F (36.8 °C) (Temporal)   10/20/20 97.7 °F (36.5 °C)   20 97.5 °F (36.4 °C)     BP Readings from Last 3 Encounters:   21 136/82   21 120/68   21 116/78     Pulse Readings from Last 3 Encounters:   21 66   21 72   20 71         Learning Assessment:  :     Learning Assessment 10/14/2015 3/12/2014   PRIMARY LEARNER Patient Patient   HIGHEST LEVEL OF EDUCATION - PRIMARY LEARNER  - SOME COLLEGE   BARRIERS PRIMARY LEARNER NONE NONE   CO-LEARNER CAREGIVER - No   PRIMARY LANGUAGE ENGLISH ENGLISH   LEARNER PREFERENCE PRIMARY READING READING   ANSWERED BY Jeana patient   RELATIONSHIP SELF SELF       Depression Screening:  :     3 most recent PHQ Screens 2021   Little interest or pleasure in doing things Not at all   Feeling down, depressed, irritable, or hopeless Not at all   Total Score PHQ 2 0       Fall Risk Assessment:  :     No flowsheet data found. Abuse Screening:  :     Abuse Screening Questionnaire 3/1/2019 3/12/2014   Do you ever feel afraid of your partner? N N   Are you in a relationship with someone who physically or mentally threatens you? N N   Is it safe for you to go home?  Y Y       Coordination of Care Questionnaire:  :     1) Have you been to an emergency room, urgent care clinic since your last visit? no   Hospitalized since your last visit? no             2) Have you seen or consulted any other health care providers outside of [address] [place] [address] [place] since your last visit? no  (Include any pap smears or colon screenings in this section.)    3) Do you have an Advance Directive on file? no  Are you interested in receiving information about Advance Directives? no    Reviewed record in preparation for visit and have obtained necessary documentation.

## 2021-04-14 NOTE — PATIENT INSTRUCTIONS
Acute Back Pain Care  1. Salon Pas 4% Lidocaine patches. Apply directly to the area of discomfort. Leave on for 12 hours. Off for 12 hours. 2. Tylenol 650 mgs 3 x daily. 3. Warm compress or heating pad. Avoid high temperatures and getting burned. Monitor closely. 4. Muscle Relaxant as prescribed (Tizanidine)   5. Consider PT if not getting better. Back Pain: Care Instructions  Your Care Instructions     Back pain has many possible causes. It is often related to problems with muscles and ligaments of the back. It may also be related to problems with the nerves, discs, or bones of the back. Moving, lifting, standing, sitting, or sleeping in an awkward way can strain the back. Sometimes you don't notice the injury until later. Arthritis is another common cause of back pain. Although it may hurt a lot, back pain usually improves on its own within several weeks. Most people recover in 12 weeks or less. Using good home treatment and being careful not to stress your back can help you feel better sooner. Follow-up care is a key part of your treatment and safety. Be sure to make and go to all appointments, and call your doctor if you are having problems. It's also a good idea to know your test results and keep a list of the medicines you take. How can you care for yourself at home? · Sit or lie in positions that are most comfortable and reduce your pain. Try one of these positions when you lie down:  ? Lie on your back with your knees bent and supported by large pillows. ? Lie on the floor with your legs on the seat of a sofa or chair. ? Lie on your side with your knees and hips bent and a pillow between your legs. ? Lie on your stomach if it does not make pain worse. · Do not sit up in bed, and avoid soft couches and twisted positions. Bed rest can help relieve pain at first, but it delays healing. Avoid bed rest after the first day of back pain. · Change positions every 30 minutes.  If you must sit for long periods of time, take breaks from sitting. Get up and walk around, or lie in a comfortable position. · Try using a heating pad on a low or medium setting for 15 to 20 minutes every 2 or 3 hours. Try a warm shower in place of one session with the heating pad. · You can also try an ice pack for 10 to 15 minutes every 2 to 3 hours. Put a thin cloth between the ice pack and your skin. · Take pain medicines exactly as directed. ? If the doctor gave you a prescription medicine for pain, take it as prescribed. ? If you are not taking a prescription pain medicine, ask your doctor if you can take an over-the-counter medicine. · Take short walks several times a day. You can start with 5 to 10 minutes, 3 or 4 times a day, and work up to longer walks. Walk on level surfaces and avoid hills and stairs until your back is better. · Return to work and other activities as soon as you can. Continued rest without activity is usually not good for your back. · To prevent future back pain, do exercises to stretch and strengthen your back and stomach. Learn how to use good posture, safe lifting techniques, and proper body mechanics. When should you call for help? Call your doctor now or seek immediate medical care if:    · You have new or worsening numbness in your legs.     · You have new or worsening weakness in your legs. (This could make it hard to stand up.)     · You lose control of your bladder or bowels. Watch closely for changes in your health, and be sure to contact your doctor if:    · You have a fever, lose weight, or don't feel well.     · You do not get better as expected. Where can you learn more? Go to http://www.[employer].com/  Enter I594 in the search box to learn more about \"Back Pain: Care Instructions. \"  Current as of: November 16, 2020               Content Version: 12.8  © 1251-1125 [employer].    Care instructions adapted under license by [employer] (which disclaims liability or warranty for this information). If you have questions about a medical condition or this instruction, always ask your healthcare professional. [address] any warranty or liability for your use of this information.

## 2021-04-14 NOTE — PROGRESS NOTES
Chief Complaint   Patient presents with    Back Pain     x 6 months       Assessment/ Plan:   Diagnoses and all orders for this visit:    1. Upper back pain on right side  Comments:  Awakened from sleep. Worsning R-shoulder/back pain. Orders:  -     XR SPINE THORAC 3 V; Future  -     tiZANidine (ZANAFLEX) 2 mg tablet; 1-2 tablets, 1-2 hours prior to bedtime. 2. Neck pain  Comments: Worsening neck/back pain. Unclear etiology. No trauma. Awakened from sleep. Orders:  -     XR SPINE CERV 4 OR 5 V; Future  -     tiZANidine (ZANAFLEX) 2 mg tablet; 1-2 tablets, 1-2 hours prior to bedtime. 3. Acute pain of right shoulder  -     XR SHOULDER RT AP/LAT MIN 2 V; Future  -     tiZANidine (ZANAFLEX) 2 mg tablet; 1-2 tablets, 1-2 hours prior to bedtime. 4. Muscle spasm of right lower extremity   Treatment as outlined above. 5. Muscle spasm  -     tiZANidine (ZANAFLEX) 2 mg tablet; 1-2 tablets, 1-2 hours prior to bedtime. 6. Anxiety  -     citalopram (CELEXA) 20 mg tablet; Take 1 Tab by mouth daily. I have discussed the diagnosis with the patient and the intended treatment plan as seen in the above orders. The patient has received an after-visit summary and questions were answered concerning future plans. Asked to return should symptoms worsen or not improve with treatment. Any pending labs and studies will be relayed to patient when they become available. Pt verbalizes understanding of plan of care and denies further questions or concerns at this time. Follow-up and Dispositions    · Return if symptoms worsen or fail to improve. Subjective:   Tan Zeng is a 54 y.o. female who presents with worsening upper R-thoracic back pain that started in December 2020, but has progressed. She reports that last night, it awakened her from sleep. The pain just started to subside after coming for her appointment. She denies any trauma to her back.  However, she has had onset of nausea with this pain and that just started to subside as well. No vomiting. She is lef handed. She did have her COVID-19 vaccine in the right arm. She completed the moderna vaccine on 2/23/2021. Patient Active Problem List    Diagnosis Date Noted    Vaginal cuff dehiscence 12/08/2019    S/P hysterectomy 10/16/2019    Pelvic congestion syndrome 09/26/2019    Burning sensation of feet 10/31/2016    TIA (transient ischemic attack) 06/27/2015    Paresthesia of left arm 06/27/2015    Chronic neck pain 06/27/2015    Anxiety 05/21/2015    Injury of elbow, left 01/02/2015    Swelling of joint, elbow, left 01/02/2015    GERD (gastroesophageal reflux disease) 03/12/2014    Unspecified hypothyroidism 12/19/2012    Panic disorder without agoraphobia 05/09/2012         Current Outpatient Medications   Medication Sig Dispense Refill    fludrocortisone (FLORINEF) 0.1 mg tablet Take 0.05 mg by mouth daily.  Diclofenac Potassium (Cambia) 50 mg pwpk 1 packet at onset and repeat in 2 hours x 1 prn, max 2 in 24 hours. 9 Packet 5    OTHER Herbal supplement for mood -macka      butalbital-acetaminophen-caffeine (FIORICET, ESGIC) -40 mg per tablet TAKE 1 TO 2 TABLETS BY MOUTH EVERY 8 HOURS AS NEEDED 40 Tab 5    valACYclovir (Valtrex) 500 mg tablet Take 1 Tab by mouth as needed (take one as needed). 30 Tab 12    estradioL (VAGIFEM) 10 mcg tab vaginal tablet Insert 1 Tab into vagina daily. 30 Tab 12    onabotulinumtoxinA (Botox) 200 unit injection Inject 155 units IM into 31 FDA indicated and approved sites in the head, face, neck every 3 months for chronic migraine. 1 Vial 5    citalopram (CELEXA) 20 mg tablet Take 1 Tab by mouth daily. 90 Tab 1    clonazePAM (KlonoPIN) 0.5 mg tablet Take 1 Tab by mouth nightly as needed (anxiety). Max Daily Amount: 0.5 mg. Indications: panic disorder 20 Tab 0    fluticasone propionate (FLONASE ALLERGY RELIEF) 50 mcg/actuation nasal spray 2 Sprays by Both Nostrils route daily.  Biotin 2,500 mcg cap Take 2,500 mcg by mouth.  CALCIUM PO Take 1,200 mg by mouth daily.  cholecalciferol, vitamin D3, (VITAMIN D3 PO) Take 50 mcg by mouth.  rimegepant (Nurtec ODT) 75 mg disintegrating tablet Take 1 tablet at HA onset as needed. Max 1 tablet in 24 hours. 8 Tab 5         Allergies   Allergen Reactions    Naproxen Other (comments)     Abdominal Pain    Other Medication Other (comments)     Z-PACK: FELT WEAK FOR ONE WEEK AFTER TAKING AND VERY LIGHT HEADED. Past Medical History:   Diagnosis Date    Anxiety     Arthritis     Cancer ([address]     BASAL CELL SKIN CANCER ON CHEST    Chronic neck pain 6/27/2015    Fatigue     GERD (gastroesophageal reflux disease) 3/12/2014    Headache     Hiatal hernia     Panic disorder     PUD (peptic ulcer disease)     TIA (transient ischemic attack) 6/27/2015         Past Surgical History:   Procedure Laterality Date    HX GYN      BTL    HX HEENT      HX WISDOM TEETH EXTRACTION           Family History   Problem Relation Age of Onset    Cancer Father     Hypertension Mother     Heart Disease Paternal Grandfather     Anesth Problems Sister         \"OUT OF IT FOR ONE WEEK POST OP\"    Anesth Problems Other     Alcohol abuse Neg Hx     Arthritis-rheumatoid Neg Hx     Asthma Neg Hx     Bleeding Prob Neg Hx     Diabetes Neg Hx     Elevated Lipids Neg Hx     Headache Neg Hx     Lung Disease Neg Hx     Migraines Neg Hx     Psychiatric Disorder Neg Hx     Stroke Neg Hx     Mental Retardation Neg Hx          Social History     Tobacco Use    Smoking status: Never Smoker    Smokeless tobacco: Never Used   Substance Use Topics    Alcohol use: Yes     Alcohol/week: 2.0 standard drinks     Types: 2 Glasses of wine per week     Comment: occ          Review of Systems    Pertinent items are noted in HPI.        Objective:     Vitals:    04/14/21 1606   BP: 136/82   Pulse: 66   Resp: 18   Temp: 98.2 °F (36.8 °C)   TempSrc: Temporal   SpO2: 98%   Weight: 136 lb (61.7 kg)   Height: 5' 5\" (1.651 m)       General: alert, cooperative, no distress   Mental  status: normal mood, behavior, speech, dress, motor activity, and thought processes, able to follow commands   HENT: NCAT   Neck: no visualized mass   Resp: no respiratory distress   Neuro: no gross deficits   Skin: no discoloration or lesions of concern on visible areas   Psychiatric: normal affect, consistent with stated mood, no evidence of hallucinations     Cony Perez MD    Patient Instructions     Acute Back Pain Care  1. Salon Pas 4% Lidocaine patches. Apply directly to the area of discomfort. Leave on for 12 hours. Off for 12 hours. 2. Tylenol 650 mgs 3 x daily. 3. Warm compress or heating pad. Avoid high temperatures and getting burned. Monitor closely. 4. Muscle Relaxant as prescribed (Tizanidine)   5. Consider PT if not getting better. Back Pain: Care Instructions  Your Care Instructions     Back pain has many possible causes. It is often related to problems with muscles and ligaments of the back. It may also be related to problems with the nerves, discs, or bones of the back. Moving, lifting, standing, sitting, or sleeping in an awkward way can strain the back. Sometimes you don't notice the injury until later. Arthritis is another common cause of back pain. Although it may hurt a lot, back pain usually improves on its own within several weeks. Most people recover in 12 weeks or less. Using good home treatment and being careful not to stress your back can help you feel better sooner. Follow-up care is a key part of your treatment and safety. Be sure to make and go to all appointments, and call your doctor if you are having problems. It's also a good idea to know your test results and keep a list of the medicines you take. How can you care for yourself at home? · Sit or lie in positions that are most comfortable and reduce your pain.  Try one of these positions when you lie down:  ? Lie on your back with your knees bent and supported by large pillows. ? Lie on the floor with your legs on the seat of a sofa or chair. ? Lie on your side with your knees and hips bent and a pillow between your legs. ? Lie on your stomach if it does not make pain worse. · Do not sit up in bed, and avoid soft couches and twisted positions. Bed rest can help relieve pain at first, but it delays healing. Avoid bed rest after the first day of back pain. · Change positions every 30 minutes. If you must sit for long periods of time, take breaks from sitting. Get up and walk around, or lie in a comfortable position. · Try using a heating pad on a low or medium setting for 15 to 20 minutes every 2 or 3 hours. Try a warm shower in place of one session with the heating pad. · You can also try an ice pack for 10 to 15 minutes every 2 to 3 hours. Put a thin cloth between the ice pack and your skin. · Take pain medicines exactly as directed. ? If the doctor gave you a prescription medicine for pain, take it as prescribed. ? If you are not taking a prescription pain medicine, ask your doctor if you can take an over-the-counter medicine. · Take short walks several times a day. You can start with 5 to 10 minutes, 3 or 4 times a day, and work up to longer walks. Walk on level surfaces and avoid hills and stairs until your back is better. · Return to work and other activities as soon as you can. Continued rest without activity is usually not good for your back. · To prevent future back pain, do exercises to stretch and strengthen your back and stomach. Learn how to use good posture, safe lifting techniques, and proper body mechanics. When should you call for help? Call your doctor now or seek immediate medical care if:    · You have new or worsening numbness in your legs.     · You have new or worsening weakness in your legs.  (This could make it hard to stand up.)     · You lose control of your bladder or bowels. Watch closely for changes in your health, and be sure to contact your doctor if:    · You have a fever, lose weight, or don't feel well.     · You do not get better as expected. Where can you learn more? Go to http://www.[employer].com/  Enter I594 in the search box to learn more about \"Back Pain: Care Instructions. \"  Current as of: November 16, 2020               Content Version: 12.8  © 2006-2021 [employer]. Care instructions adapted under license by [employer] (which disclaims liability or warranty for this information). If you have questions about a medical condition or this instruction, always ask your healthcare professional. [address] any warranty or liability for your use of this information.

## 2021-04-20 ENCOUNTER — OFFICE VISIT (OUTPATIENT)
Dept: NEUROLOGY | Age: 56
End: 2021-04-20
Payer: COMMERCIAL

## 2021-04-20 ENCOUNTER — TELEPHONE (OUTPATIENT)
Dept: FAMILY MEDICINE CLINIC | Age: 56
End: 2021-04-20

## 2021-04-20 PROCEDURE — 64615 CHEMODENERV MUSC MIGRAINE: CPT | Performed by: NURSE PRACTITIONER

## 2021-04-20 RX ORDER — ONABOTULINUMTOXINA 200 [USP'U]/1
200 INJECTION, POWDER, LYOPHILIZED, FOR SOLUTION INTRADERMAL; INTRAMUSCULAR ONCE
Qty: 200 UNITS | Refills: 0 | Status: SHIPPED | COMMUNITY
Start: 2021-04-20 | End: 2021-04-20

## 2021-04-20 NOTE — PROGRESS NOTES
[facility]  OFFICE PROCEDURE PROGRESS NOTE        Chart reviewed for the following:   I, [de-identified] M TERRELL Phillips, have reviewed the History, Physical and updated the Allergic reactions for Jeana  [facility] performed immediately prior to start of procedure:   I, [de-identified] M TERRELL Phillips, have performed the following reviews on Tan Pretzel prior to the start of the procedure:            * Patient was identified by name and date of birth   * Agreement on procedure being performed was verified  * Risks and Benefits explained to the patient  * Procedure site verified and marked as necessary  * Patient was positioned for comfort  * Consent was signed and verified     Time: 1050      Date of procedure: 4/20/2021    Procedure performed by:  Noemy Alanis NP    Provider assisted by: None    Patient assisted by: None    How tolerated by patient: tolerated the procedure well with no complications    Post Procedural Pain Scale: 2 - Hurts Little Bit    Comments: None      Botox Injection Note       Indication: patient has chronic recurrent migraine, has 7-10 less migraine days per month with botox injections    Procedure:   Botox concentration: 200 units in 4 ml of preservative-free normal saline. 31 sites injections, distribution as follow      Units/site  Sites Sides Subtotal    Procerus 5 1 1 5    5 1 2 10   Frontalis 5 2 2 20   Temporalis 5 4 2 40   Occipitalis 5 3 2 30   Upper cervical paraspinalis 5 2 2 20   Trapezius 5 3 2 30         200 units Botox were reconstituted, 155 units injected as above and the remainder was unavoidably wasted.      Patient tolerated procedure well.     _____________________________   Jayme Malin

## 2021-04-20 NOTE — TELEPHONE ENCOUNTER
L/M with results note and that they have been released on my chart. Asked pt to call with where she wants to go for PT and if she has any questions.

## 2021-04-20 NOTE — TELEPHONE ENCOUNTER
----- Message from Mary Jane Yanes sent at 4/20/2021  8:30 AM EDT -----  Regarding: Dr. Tho Abreu  General Message/Vendor Calls    Caller's first and last name: n/a      Reason for call: Xray results      Callback required yes/no and why: yes      Best contact number(s):[phone]      Details to clarify the request: [address]

## 2021-05-17 ENCOUNTER — PATIENT MESSAGE (OUTPATIENT)
Dept: FAMILY MEDICINE CLINIC | Age: 56
End: 2021-05-17

## 2021-05-25 ENCOUNTER — PATIENT MESSAGE (OUTPATIENT)
Dept: FAMILY MEDICINE CLINIC | Age: 56
End: 2021-05-25

## 2021-06-02 ENCOUNTER — APPOINTMENT (OUTPATIENT)
Dept: CT IMAGING | Age: 56
End: 2021-06-02
Attending: STUDENT IN AN ORGANIZED HEALTH CARE EDUCATION/TRAINING PROGRAM
Payer: COMMERCIAL

## 2021-06-02 ENCOUNTER — APPOINTMENT (OUTPATIENT)
Dept: GENERAL RADIOLOGY | Age: 56
End: 2021-06-02
Attending: STUDENT IN AN ORGANIZED HEALTH CARE EDUCATION/TRAINING PROGRAM
Payer: COMMERCIAL

## 2021-06-02 ENCOUNTER — HOSPITAL ENCOUNTER (OUTPATIENT)
Age: 56
Setting detail: OBSERVATION
Discharge: HOME | End: 2021-06-03
Attending: STUDENT IN AN ORGANIZED HEALTH CARE EDUCATION/TRAINING PROGRAM | Admitting: FAMILY MEDICINE
Payer: COMMERCIAL

## 2021-06-02 ENCOUNTER — APPOINTMENT (OUTPATIENT)
Dept: MRI IMAGING | Age: 56
End: 2021-06-02
Attending: STUDENT IN AN ORGANIZED HEALTH CARE EDUCATION/TRAINING PROGRAM
Payer: COMMERCIAL

## 2021-06-02 DIAGNOSIS — R07.9 CHEST PAIN, UNSPECIFIED TYPE: ICD-10-CM

## 2021-06-02 DIAGNOSIS — R42 DIZZINESS: Primary | ICD-10-CM

## 2021-06-02 PROBLEM — R20.0 LEFT ARM NUMBNESS: Status: ACTIVE | Noted: 2021-06-02

## 2021-06-02 LAB
ALBUMIN SERPL-MCNC: 4.5 G/DL (ref 3.5–5)
ALBUMIN/GLOB SERPL: 1.6 {RATIO} (ref 1.1–2.2)
ALP SERPL-CCNC: 86 U/L (ref 45–117)
ALT SERPL-CCNC: 25 U/L (ref 12–78)
AMMONIA PLAS-SCNC: 12 UMOL/L
AMPHET UR QL SCN: NEGATIVE
ANION GAP SERPL CALC-SCNC: 6 MMOL/L (ref 5–15)
AST SERPL-CCNC: 21 U/L (ref 15–37)
ATRIAL RATE: 80 BPM
BARBITURATES UR QL SCN: NEGATIVE
BASOPHILS # BLD: 0.1 K/UL (ref 0–0.1)
BASOPHILS NFR BLD: 1 % (ref 0–1)
BENZODIAZ UR QL: NEGATIVE
BILIRUB SERPL-MCNC: 0.3 MG/DL (ref 0.2–1)
BUN SERPL-MCNC: 14 MG/DL (ref 6–20)
BUN/CREAT SERPL: 22 (ref 12–20)
CALCIUM SERPL-MCNC: 9 MG/DL (ref 8.5–10.1)
CALCULATED P AXIS, ECG09: 73 DEGREES
CALCULATED R AXIS, ECG10: 72 DEGREES
CALCULATED T AXIS, ECG11: 37 DEGREES
CANNABINOIDS UR QL SCN: NEGATIVE
CHLORIDE SERPL-SCNC: 104 MMOL/L (ref 97–108)
CO2 SERPL-SCNC: 29 MMOL/L (ref 21–32)
COCAINE UR QL SCN: NEGATIVE
COMMENT, HOLDF: NORMAL
CREAT SERPL-MCNC: 0.63 MG/DL (ref 0.55–1.02)
DIAGNOSIS, 93000: NORMAL
DIFFERENTIAL METHOD BLD: NORMAL
DRUG SCRN COMMENT,DRGCM: NORMAL
EOSINOPHIL # BLD: 0.1 K/UL (ref 0–0.4)
EOSINOPHIL NFR BLD: 2 % (ref 0–7)
ERYTHROCYTE [DISTWIDTH] IN BLOOD BY AUTOMATED COUNT: 12.4 % (ref 11.5–14.5)
GLOBULIN SER CALC-MCNC: 2.9 G/DL (ref 2–4)
GLUCOSE SERPL-MCNC: 91 MG/DL (ref 65–100)
HCT VFR BLD AUTO: 38.8 % (ref 35–47)
HGB BLD-MCNC: 12.9 G/DL (ref 11.5–16)
IMM GRANULOCYTES # BLD AUTO: 0 K/UL (ref 0–0.04)
IMM GRANULOCYTES NFR BLD AUTO: 0 % (ref 0–0.5)
LYMPHOCYTES # BLD: 1.2 K/UL (ref 0.8–3.5)
LYMPHOCYTES NFR BLD: 28 % (ref 12–49)
MAGNESIUM SERPL-MCNC: 2 MG/DL (ref 1.6–2.4)
MCH RBC QN AUTO: 31.7 PG (ref 26–34)
MCHC RBC AUTO-ENTMCNC: 33.2 G/DL (ref 30–36.5)
MCV RBC AUTO: 95.3 FL (ref 80–99)
METHADONE UR QL: NEGATIVE
MONOCYTES # BLD: 0.4 K/UL (ref 0–1)
MONOCYTES NFR BLD: 9 % (ref 5–13)
NEUTS SEG # BLD: 2.7 K/UL (ref 1.8–8)
NEUTS SEG NFR BLD: 60 % (ref 32–75)
NRBC # BLD: 0 K/UL (ref 0–0.01)
NRBC BLD-RTO: 0 PER 100 WBC
OPIATES UR QL: NEGATIVE
P-R INTERVAL, ECG05: 148 MS
PCP UR QL: NEGATIVE
PLATELET # BLD AUTO: 219 K/UL (ref 150–400)
PMV BLD AUTO: 9.6 FL (ref 8.9–12.9)
POTASSIUM SERPL-SCNC: 3.8 MMOL/L (ref 3.5–5.1)
PROT SERPL-MCNC: 7.4 G/DL (ref 6.4–8.2)
Q-T INTERVAL, ECG07: 372 MS
QRS DURATION, ECG06: 86 MS
QTC CALCULATION (BEZET), ECG08: 429 MS
RBC # BLD AUTO: 4.07 M/UL (ref 3.8–5.2)
SAMPLES BEING HELD,HOLD: NORMAL
SODIUM SERPL-SCNC: 139 MMOL/L (ref 136–145)
TROPONIN I SERPL-MCNC: <0.05 NG/ML
TSH SERPL DL<=0.05 MIU/L-ACNC: 1.2 UIU/ML (ref 0.36–3.74)
VENTRICULAR RATE, ECG03: 80 BPM
WBC # BLD AUTO: 4.5 K/UL (ref 3.6–11)

## 2021-06-02 PROCEDURE — 96374 THER/PROPH/DIAG INJ IV PUSH: CPT

## 2021-06-02 PROCEDURE — 83735 ASSAY OF MAGNESIUM: CPT

## 2021-06-02 PROCEDURE — 36415 COLL VENOUS BLD VENIPUNCTURE: CPT

## 2021-06-02 PROCEDURE — 70496 CT ANGIOGRAPHY HEAD: CPT

## 2021-06-02 PROCEDURE — 74011250637 HC RX REV CODE- 250/637: Performed by: STUDENT IN AN ORGANIZED HEALTH CARE EDUCATION/TRAINING PROGRAM

## 2021-06-02 PROCEDURE — 85025 COMPLETE CBC W/AUTO DIFF WBC: CPT

## 2021-06-02 PROCEDURE — 70553 MRI BRAIN STEM W/O & W/DYE: CPT

## 2021-06-02 PROCEDURE — 74011000636 HC RX REV CODE- 636: Performed by: RADIOLOGY

## 2021-06-02 PROCEDURE — 74011250636 HC RX REV CODE- 250/636: Performed by: STUDENT IN AN ORGANIZED HEALTH CARE EDUCATION/TRAINING PROGRAM

## 2021-06-02 PROCEDURE — 99218 HC RM OBSERVATION: CPT

## 2021-06-02 PROCEDURE — 93005 ELECTROCARDIOGRAM TRACING: CPT

## 2021-06-02 PROCEDURE — A9576 INJ PROHANCE MULTIPACK: HCPCS | Performed by: FAMILY MEDICINE

## 2021-06-02 PROCEDURE — 84484 ASSAY OF TROPONIN QUANT: CPT

## 2021-06-02 PROCEDURE — 84443 ASSAY THYROID STIM HORMONE: CPT

## 2021-06-02 PROCEDURE — 82140 ASSAY OF AMMONIA: CPT

## 2021-06-02 PROCEDURE — 71046 X-RAY EXAM CHEST 2 VIEWS: CPT

## 2021-06-02 PROCEDURE — 70450 CT HEAD/BRAIN W/O DYE: CPT

## 2021-06-02 PROCEDURE — 80053 COMPREHEN METABOLIC PANEL: CPT

## 2021-06-02 PROCEDURE — 80307 DRUG TEST PRSMV CHEM ANLYZR: CPT

## 2021-06-02 PROCEDURE — 99285 EMERGENCY DEPT VISIT HI MDM: CPT

## 2021-06-02 PROCEDURE — 96372 THER/PROPH/DIAG INJ SC/IM: CPT

## 2021-06-02 PROCEDURE — 74011636320 HC RX REV CODE- 636/320: Performed by: FAMILY MEDICINE

## 2021-06-02 RX ORDER — SODIUM CHLORIDE 0.9 % (FLUSH) 0.9 %
5-40 SYRINGE (ML) INJECTION EVERY 8 HOURS
Status: DISCONTINUED | OUTPATIENT
Start: 2021-06-02 | End: 2021-06-03 | Stop reason: HOSPADM

## 2021-06-02 RX ORDER — ENOXAPARIN SODIUM 100 MG/ML
40 INJECTION SUBCUTANEOUS EVERY 24 HOURS
Status: DISCONTINUED | OUTPATIENT
Start: 2021-06-02 | End: 2021-06-03 | Stop reason: HOSPADM

## 2021-06-02 RX ORDER — SODIUM CHLORIDE 9 MG/ML
1000 INJECTION, SOLUTION INTRAVENOUS ONCE
Status: COMPLETED | OUTPATIENT
Start: 2021-06-02 | End: 2021-06-02

## 2021-06-02 RX ORDER — LORAZEPAM 2 MG/ML
1 INJECTION INTRAMUSCULAR ONCE
Status: COMPLETED | OUTPATIENT
Start: 2021-06-02 | End: 2021-06-02

## 2021-06-02 RX ORDER — ACETAMINOPHEN 325 MG/1
650 TABLET ORAL
Status: DISCONTINUED | OUTPATIENT
Start: 2021-06-02 | End: 2021-06-03 | Stop reason: HOSPADM

## 2021-06-02 RX ORDER — MECLIZINE HYDROCHLORIDE 25 MG/1
50 TABLET ORAL
Status: COMPLETED | OUTPATIENT
Start: 2021-06-02 | End: 2021-06-02

## 2021-06-02 RX ORDER — ACETAMINOPHEN 650 MG/1
650 SUPPOSITORY RECTAL
Status: DISCONTINUED | OUTPATIENT
Start: 2021-06-02 | End: 2021-06-03 | Stop reason: HOSPADM

## 2021-06-02 RX ORDER — SODIUM CHLORIDE 0.9 % (FLUSH) 0.9 %
5-40 SYRINGE (ML) INJECTION AS NEEDED
Status: DISCONTINUED | OUTPATIENT
Start: 2021-06-02 | End: 2021-06-03 | Stop reason: HOSPADM

## 2021-06-02 RX ORDER — CITALOPRAM 20 MG/1
20 TABLET, FILM COATED ORAL DAILY
Status: DISCONTINUED | OUTPATIENT
Start: 2021-06-03 | End: 2021-06-03 | Stop reason: HOSPADM

## 2021-06-02 RX ORDER — POLYETHYLENE GLYCOL 3350 17 G/17G
17 POWDER, FOR SOLUTION ORAL DAILY PRN
Status: DISCONTINUED | OUTPATIENT
Start: 2021-06-02 | End: 2021-06-03 | Stop reason: HOSPADM

## 2021-06-02 RX ORDER — ASPIRIN 325 MG
325 TABLET ORAL
Status: COMPLETED | OUTPATIENT
Start: 2021-06-02 | End: 2021-06-02

## 2021-06-02 RX ADMIN — LORAZEPAM 1 MG: 2 INJECTION INTRAMUSCULAR; INTRAVENOUS at 17:59

## 2021-06-02 RX ADMIN — GADOTERIDOL 12 ML: 279.3 INJECTION, SOLUTION INTRAVENOUS at 19:26

## 2021-06-02 RX ADMIN — ENOXAPARIN SODIUM 40 MG: 40 INJECTION SUBCUTANEOUS at 17:59

## 2021-06-02 RX ADMIN — SODIUM CHLORIDE 1000 ML: 9 INJECTION, SOLUTION INTRAVENOUS at 13:10

## 2021-06-02 RX ADMIN — MECLIZINE HYDROCHLORIDE 50 MG: 25 TABLET ORAL at 13:10

## 2021-06-02 RX ADMIN — Medication 10 ML: at 20:06

## 2021-06-02 RX ADMIN — ASPIRIN 325 MG ORAL TABLET 325 MG: 325 PILL ORAL at 16:10

## 2021-06-02 RX ADMIN — IOPAMIDOL 100 ML: 755 INJECTION, SOLUTION INTRAVENOUS at 18:06

## 2021-06-02 NOTE — H&P
[address] [facility] [address] [place] [address]   Office [phone]  Fax [phone]       Admission H&P     Name: Roberto Desir MRN: [id number]  Sex: Female   YOB: 1965  Age: 54 y.o. PCP: Lisa Ross MD     Source of Information: patient, medical records    Chief complaint: Dizziness, L arm numbness    History of Present Illness    Roberto Desir is a 54 y.o. female with known Migraine disorder, chronic dizziness, who anxiety who presents to the ER complaining of dizziness and L arm numbness . This morning around 7/730AM she reports she felt severe dizziness. She took a double dose of her home Florinef and reports she developed central chest pain with associate L arm numbness and tingling. She immediately went to the ED to be evaluated. Currently her CP and numbness have resolved however she still feels subjective dizziness and mild weakness. She denies any hx of  CVA, cough, SOB, fever, chills, abdominal pain, nausea or vomiting. COVID questions: : Denies cough, sob, fever, diarrhea. Lives alone. No sick or COVID contacts.  Is fully vaccinated    In the ER:    -vitals were remarkable for Temp 98F, HR 76, /81, RR 16 , SpO2 100%  on RA   -labs were remarkable for trop neg  -Imaging that was done includes CT head no acute process, CXR no acute process  -Treatment was ASA 325mg, 1L bolus, meclizine 50mg    EKG: NSR rate 80    Past Medical History:   Diagnosis Date    Anxiety     Arthritis     Cancer ([facility] [address]     BASAL CELL SKIN CANCER ON CHEST    Chronic neck pain 6/27/2015    Fatigue     GERD (gastroesophageal reflux disease) 3/12/2014    Headache     Hiatal hernia     Panic disorder     PUD (peptic ulcer disease)     TIA (transient ischemic attack) 6/27/2015      Patient Vitals for the past 12 hrs:   Temp Pulse Resp BP SpO2   06/02/21 1400  77 18 126/75 98 %   06/02/21 1245  71 15 126/80 97 %   06/02/21 1200  78 16 133/81 98 %   06/02/21 1136 98 °F (36.7 °C) 76 16 (!) 145/81 100 %       Home Medications   Prior to Admission medications    Medication Sig Start Date End Date Taking? Authorizing Provider   fludrocortisone (FLORINEF) 0.1 mg tablet Take 0.05 mg by mouth daily. 1/18/21   Provider, Historical   tiZANidine (ZANAFLEX) 2 mg tablet 1-2 tablets, 1-2 hours prior to bedtime. 4/14/21   Roxana Rene MD   citalopram (CELEXA) 20 mg tablet Take 1 Tab by mouth daily. 4/14/21   Roxana Rene MD   Diclofenac Potassium (Cambia) 50 mg pwpk 1 packet at onset and repeat in 2 hours x 1 prn, max 2 in 24 hours. 2/19/21   Ramses Phillips NP   rimegepant (Nurtec ODT) 75 mg disintegrating tablet Take 1 tablet at HA onset as needed. Max 1 tablet in 24 hours. 2/19/21   Ramses Phlilips NP   OTHER Herbal supplement for mood -macka    Provider, Historical   butalbital-acetaminophen-caffeine (FIORICET, ESGIC) -40 mg per tablet TAKE 1 TO 2 TABLETS BY MOUTH EVERY 8 HOURS AS NEEDED 1/19/21   Ramses Phillips, NP   valACYclovir (Valtrex) 500 mg tablet Take 1 Tab by mouth as needed (take one as needed). 10/16/20   Claude Burden, MD   estradioL (VAGIFEM) 10 mcg tab vaginal tablet Insert 1 Tab into vagina daily. 10/16/20   Claude Burden, MD   onabotulinumtoxinA (Botox) 200 unit injection Inject 155 units IM into 31 FDA indicated and approved sites in the head, face, neck every 3 months for chronic migraine. 3/31/20   LibradoAnisha MD   clonazePAM (KlonoPIN) 0.5 mg tablet Take 1 Tab by mouth nightly as needed (anxiety). Max Daily Amount: 0.5 mg. Indications: panic disorder 3/17/20   Roxana Rene MD   fluticasone propionate [facility] ALLERGY RELIEF) 50 mcg/actuation nasal spray 2 Sprays by Both Nostrils route daily. Provider, Historical   Biotin 2,500 mcg cap Take 2,500 mcg by mouth. Provider, Historical   CALCIUM PO Take 1,200 mg by mouth daily. Provider, Historical   cholecalciferol, vitamin D3, (VITAMIN D3 PO) Take 50 mcg by mouth.     Provider, Historical Allergies  Allergies   Allergen Reactions    Naproxen Other (comments)     Abdominal Pain    Other Medication Other (comments)     Z-PACK: FELT WEAK FOR ONE WEEK AFTER TAKING AND VERY LIGHT HEADED. Past Medical History:   Diagnosis Date    Anxiety     Arthritis     Cancer ([facility] [address]     BASAL CELL SKIN CANCER ON CHEST    Chronic neck pain 6/27/2015    Fatigue     GERD (gastroesophageal reflux disease) 3/12/2014    Headache     Hiatal hernia     Panic disorder     PUD (peptic ulcer disease)     TIA (transient ischemic attack) 6/27/2015       Past Surgical History:   Procedure Laterality Date    HX GYN      BTL    HX HEENT      HX WISDOM TEETH EXTRACTION         Family History   Problem Relation Age of Onset    Cancer Father     Hypertension Mother     Heart Disease Paternal Grandfather     Anesth Problems Sister         \"OUT OF IT FOR ONE WEEK POST OP\"    Anesth Problems Other     Alcohol abuse Neg Hx     Arthritis-rheumatoid Neg Hx     Asthma Neg Hx     Bleeding Prob Neg Hx     Diabetes Neg Hx     Elevated Lipids Neg Hx     Headache Neg Hx     Lung Disease Neg Hx     Migraines Neg Hx     Psychiatric Disorder Neg Hx     Stroke Neg Hx     Mental Retardation Neg Hx        Social History  Social History     Socioeconomic History    Marital status:      Spouse name: Not on file    Number of children: Not on file    Years of education: Not on file    Highest education level: Not on file   Occupational History    Not on file   Tobacco Use    Smoking status: Never Smoker    Smokeless tobacco: Never Used   Substance and Sexual Activity    Alcohol use:  Yes     Alcohol/week: 2.0 standard drinks     Types: 2 Glasses of wine per week     Comment: occ    Drug use: No    Sexual activity: Yes     Partners: Male     Birth control/protection: Surgical   Other Topics Concern     Service No    Blood Transfusions No    Caffeine Concern No    Occupational Exposure No  Hobby Hazards No    Sleep Concern Yes    Stress Concern Yes    Weight Concern Yes    Special Diet No    Back Care No    Exercise Yes    Bike Helmet No    Seat Belt Yes    Self-Exams Not Asked   Social History Narrative    Not on file     Social Determinants of Health     Financial Resource Strain:     Difficulty of Paying Living Expenses:    Food Insecurity:     Worried About Running Out of Food in the Last Year:     [address] in the Last Year:    Transportation Needs:     Lack of Transportation (Medical):  Lack of Transportation (Non-Medical):    Physical Activity:     Days of Exercise per Week:     Minutes of Exercise per Session:    Stress:     Feeling of Stress :    Social Connections:     Frequency of Communication with Friends and Family:     Frequency of Social Gatherings with Friends and Family:     Attends Restoration Services:     Active Member of Clubs or Organizations:     Attends Club or Organization Meetings:     Marital Status:    Intimate Partner Violence:     Fear of Current or Ex-Partner:     Emotionally Abused:     Physically Abused:     Sexually Abused:        Alcohol history: Social  Smoking history: Smoked from [age] years old  Illicit drug history: Not at all    Living arrangement: patient lives alone. Ambulates: Independently     Review of Systems   Constitutional: Negative for chills and fever. HENT: Negative for congestion and tinnitus. Eyes: Negative for blurred vision, double vision and photophobia. Respiratory: Negative for cough, hemoptysis, shortness of breath and stridor. Cardiovascular: Positive for chest pain. Negative for palpitations. Gastrointestinal: Negative for abdominal pain, nausea and vomiting. Genitourinary: Negative for dysuria, frequency and urgency. Musculoskeletal: Negative for back pain, myalgias and neck pain. Neurological: Positive for dizziness, tingling, sensory change and weakness.  Negative for focal weakness and headaches. Psychiatric/Behavioral: Negative for depression and suicidal ideas. Physical Exam      O2 Device: None (Room air)     Physical Exam  Constitutional:       General: She is not in acute distress. Appearance: She is not ill-appearing. HENT:      Head: Normocephalic and atraumatic. Eyes:      Extraocular Movements: Extraocular movements intact. Pupils: Pupils are equal, round, and reactive to light. Cardiovascular:      Rate and Rhythm: Normal rate and regular rhythm. Heart sounds: No murmur heard. Pulmonary:      Effort: Pulmonary effort is normal. No respiratory distress. Abdominal:      General: There is no distension. Palpations: Abdomen is soft. Tenderness: There is no abdominal tenderness. There is no guarding or rebound. Musculoskeletal:      Right lower leg: No edema. Left lower leg: No edema. Skin:     General: Skin is warm. Capillary Refill: Capillary refill takes less than 2 seconds. Neurological:      General: No focal deficit present. Mental Status: She is oriented to person, place, and time. Cranial Nerves: No cranial nerve deficit. Sensory: No sensory deficit. Motor: No weakness. Coordination: Coordination normal.      Comments: R sided nystagmus   Psychiatric:         Mood and Affect: Mood normal.          Laboratory Data  Recent Results (from the past 8 hour(s))   SAMPLES BEING HELD    Collection Time: 06/02/21 11:46 AM   Result Value Ref Range    SAMPLES BEING HELD 1SST,1RED,1DRK GRN     COMMENT        Add-on orders for these samples will be processed based on acceptable specimen integrity and analyte stability, which may vary by analyte.    TROPONIN I    Collection Time: 06/02/21 11:46 AM   Result Value Ref Range    Troponin-I, Qt. <0.05 <0.05 ng/mL   CBC WITH AUTOMATED DIFF    Collection Time: 06/02/21 11:46 AM   Result Value Ref Range    WBC 4.5 3.6 - 11.0 K/uL    RBC 4.07 3.80 - 5.20 M/uL HGB 12.9 11.5 - 16.0 g/dL    HCT 38.8 35.0 - 47.0 %    MCV 95.3 80.0 - 99.0 FL    MCH 31.7 26.0 - 34.0 PG    MCHC 33.2 30.0 - 36.5 g/dL    RDW 12.4 11.5 - 14.5 %    PLATELET 369 227 - 155 K/uL    MPV 9.6 8.9 - 12.9 FL    NRBC 0.0 0  WBC    ABSOLUTE NRBC 0.00 0.00 - 0.01 K/uL    NEUTROPHILS 60 32 - 75 %    LYMPHOCYTES 28 12 - 49 %    MONOCYTES 9 5 - 13 %    EOSINOPHILS 2 0 - 7 %    BASOPHILS 1 0 - 1 %    IMMATURE GRANULOCYTES 0 0.0 - 0.5 %    ABS. NEUTROPHILS 2.7 1.8 - 8.0 K/UL    ABS. LYMPHOCYTES 1.2 0.8 - 3.5 K/UL    ABS. MONOCYTES 0.4 0.0 - 1.0 K/UL    ABS. EOSINOPHILS 0.1 0.0 - 0.4 K/UL    ABS. BASOPHILS 0.1 0.0 - 0.1 K/UL    ABS. IMM. GRANS. 0.0 0.00 - 0.04 K/UL    DF AUTOMATED     METABOLIC PANEL, COMPREHENSIVE    Collection Time: 06/02/21 11:46 AM   Result Value Ref Range    Sodium 139 136 - 145 mmol/L    Potassium 3.8 3.5 - 5.1 mmol/L    Chloride 104 97 - 108 mmol/L    CO2 29 21 - 32 mmol/L    Anion gap 6 5 - 15 mmol/L    Glucose 91 65 - 100 mg/dL    BUN 14 6 - 20 MG/DL    Creatinine 0.63 0.55 - 1.02 MG/DL    BUN/Creatinine ratio 22 (H) 12 - 20      GFR est AA >60 >60 ml/min/1.73m2    GFR est non-AA >60 >60 ml/min/1.73m2    Calcium 9.0 8.5 - 10.1 MG/DL    Bilirubin, total 0.3 0.2 - 1.0 MG/DL    ALT (SGPT) 25 12 - 78 U/L    AST (SGOT) 21 15 - 37 U/L    Alk. phosphatase 86 45 - 117 U/L    Protein, total 7.4 6.4 - 8.2 g/dL    Albumin 4.5 3.5 - 5.0 g/dL    Globulin 2.9 2.0 - 4.0 g/dL    A-G Ratio 1.6 1.1 - 2.2     MAGNESIUM    Collection Time: 06/02/21 11:46 AM   Result Value Ref Range    Magnesium 2.0 1.6 - 2.4 mg/dL   TROPONIN I    Collection Time: 06/02/21  2:07 PM   Result Value Ref Range    Troponin-I, Qt. <0.05 <0.05 ng/mL       Imaging  CXR Results  (Last 48 hours)               06/02/21 1243  XR CHEST PA LAT Final result    Impression:  No acute process or change compared to the prior exam.           Narrative:  Exam:  2 view chest       Indication: Chest pain       Comparison to 3/4/2019.        PA and lateral views demonstrate normal heart size. There is no acute process in   the lung fields. The osseous structures are unremarkable. CT Results  (Last 48 hours)               06/02/21 1315  CT HEAD WO CONT Final result    Impression:  1. No evidence of acute intracranial abnormality. Narrative:  EXAM:  CT HEAD WO CONT       INDICATION:   dizziness       COMPARISON: CT head 9/24/2015. TECHNIQUE: Unenhanced CT of the head was performed using 5 mm images. Brain and   bone windows were generated. CT dose reduction was achieved through use of a   standardized protocol tailored for this examination and automatic exposure   control for dose modulation. FINDINGS:   The ventricles are normal in size and position. Basilar cisterns are patent. No   midline shift. There is no evidence of acute infarct, hemorrhage, or extraaxial   fluid collection. Unchanged prominent left retrocerebellar CSF. The paranasal sinuses, mastoid air cells, and middle ears are clear. The orbital   contents are within normal limits. There are no significant osseous or   extracranial soft tissue lesions. Assessment and Plan     Adelaide Avendaño is a 54 y.o. female with a PMH who is admitted for Dizziness and associated L arm numbness. Dizziness with L arm numbness in the setting of chronic dizziness. Patient is on Florinef daily for chronic dizziness. Reports she took a a double dose this morning but reports reported symptoms. On PE patient has mild R sided nystagmus and mild ataxia. Symptoms could be secondary to her chronic dizziness however seeing she did not respond to her home medications, CVA is possible. NIHSS 0. VAN 0. CT head no acute process. CXR no acute process.   - Admit to Telemetry  - Vital signs per unit protocol  - Neuro c/s  - MRI brain, CTA head and neck  - TSH, Lipid panel, A1c, UDS, Ammonia, Mg, Phos  - Hold home florinef  - Trend trops  - Permissive HTN for the first 24-48 hours SBP<220 and DBP<110    - PT/OT/CM  - Neuro checks q4h     Chest pain    Now resolved. Trop neg POA. EKG NSR.  - Trend troponin Q6H    Migraine disorder followed by Jadyn Yuan NP. Has received botox in the past.  -hold home Ciorcet, Nurtec, and Cambia    Anxiety patient 'd and medication is appropriate. Last refill in 03/2020   -Continue home Celexa 20mg  - Hold Klonopin 0.5mg qhs prn while symptomatic      FEN/GI - NPO. Until swallow study  Activity - Ambulate with assistance  DVT prophylaxis - Lovenox  GI prophylaxis - Not indicated at this time  Fall prophylaxis - Not indicated at this time. Disposition - Admit to Telemetry. Plan to d/c to TBD. Code Status - Full, discussed with patient / caregivers.   Next of Kin Name and Contact Daughter Ibis Jenkins [phone]    Patient Lydia Frazier will be discussed Dr. Zulma Owen.     4:01 PM, 06/02/21  Racquel Quijano DO  Family Medicine Resident       For Billing    Chief Complaint   Patient presents with   Sanjuana Elizondo Chest Pain       Hospital Problems  Date Reviewed: 4/18/2021        Codes Class Noted POA    Dizziness ICD-10-CM: G41  ICD-9-CM: 780.4  6/2/2021 Unknown        Chest pain ICD-10-CM: R07.9  ICD-9-CM: 786.50  6/2/2021 Unknown        Left arm numbness ICD-10-CM: R20.0  ICD-9-CM: 782.0  6/2/2021 Unknown

## 2021-06-02 NOTE — PROGRESS NOTES
Bedside shift change report given to Brittany Ayala [id number] (oncoming nurse) by Len Kruger (offgoing nurse). Report included the following information SBAR, ED Summary, Intake/Output, MAR and Recent Results.

## 2021-06-02 NOTE — PROGRESS NOTES
TRANSFER - IN REPORT:    Verbal report received from Joey Valle (name) on Madhuri Stringer  being received from ED (unit) for routine progression of care      Report consisted of patients Situation, Background, Assessment and   Recommendations(SBAR). Information from the following report(s) SBAR, Intake/Output, MAR and Recent Results was reviewed with the receiving nurse. Opportunity for questions and clarification was provided. Assessment completed upon patients arrival to unit and care assumed.

## 2021-06-02 NOTE — ED TRIAGE NOTES
Pt arrives to ED with chest pain that radiates down left arm, started at 7am this morning. Pt also notes nausea and weakness/fatigue. Pt states she has had this happen before, but turned out to \"be nothing\". Pt denies any SOB or cardiac hx.

## 2021-06-02 NOTE — ED PROVIDER NOTES
51-year-old female history of anxiety, arthritis, basal cell carcinoma of the chest, chronic neck pain, fatigue, TIAs presented today secondary to chest pain. She reports that around 7 or 730 this morning she was driving and felt as if her blood pressure was low with dizziness. She forgot to take her fludrocortisone which she takes to elevated blood pressure. She took it when she got home and felt better but not expected but then developed left-sided chest pain with tingling in the left arm. It was an aching left upper chest pain that did not radiate. It was not exertional or pleuritic. No associated shortness of breath. Lasted for about 2 hours, starting around 10:30 AM, ending by the time I evaluated her. No history of cardiac disease that she is aware of. She reports that she now feels a bit dizzy, especially with turning her head to the right, getting worse since being here  No focal weakness. No trouble ambulating or speaking. No blurred vision. She does not smoke. She denies history of DVT or PE. No leg pain or leg swelling.            Past Medical History:   Diagnosis Date    Anxiety     Arthritis     Cancer ([facility] [address]     BASAL CELL SKIN CANCER ON CHEST    Chronic neck pain 6/27/2015    Fatigue     GERD (gastroesophageal reflux disease) 3/12/2014    Headache     Hiatal hernia     Panic disorder     PUD (peptic ulcer disease)     TIA (transient ischemic attack) 6/27/2015       Past Surgical History:   Procedure Laterality Date    HX GYN      BTL    HX HEENT      HX WISDOM TEETH EXTRACTION           Family History:   Problem Relation Age of Onset    Cancer Father     Hypertension Mother     Heart Disease Paternal Grandfather     Anesth Problems Sister         \"OUT OF IT FOR ONE WEEK POST OP\"    Anesth Problems Other     Alcohol abuse Neg Hx     Arthritis-rheumatoid Neg Hx     Asthma Neg Hx     Bleeding Prob Neg Hx     Diabetes Neg Hx     Elevated Lipids Neg Hx     Headache Neg Hx     Lung Disease Neg Hx     Migraines Neg Hx     Psychiatric Disorder Neg Hx     Stroke Neg Hx     Mental Retardation Neg Hx        Social History     Socioeconomic History    Marital status:      Spouse name: Not on file    Number of children: Not on file    Years of education: Not on file    Highest education level: Not on file   Occupational History    Not on file   Tobacco Use    Smoking status: Never Smoker    Smokeless tobacco: Never Used   Substance and Sexual Activity    Alcohol use: Yes     Alcohol/week: 2.0 standard drinks     Types: 2 Glasses of wine per week     Comment: occ    Drug use: No    Sexual activity: Yes     Partners: Male     Birth control/protection: Surgical   Other Topics Concern     Service No    Blood Transfusions No    Caffeine Concern No    Occupational Exposure No    Hobby Hazards No    Sleep Concern Yes    Stress Concern Yes    Weight Concern Yes    Special Diet No    Back Care No    Exercise Yes    Bike Helmet No    Seat Belt Yes    Self-Exams Not Asked   Social History Narrative    Not on file     Social Determinants of Health     Financial Resource Strain:     Difficulty of Paying Living Expenses:    Food Insecurity:     Worried About Running Out of Food in the Last Year:     Ran Out of Food in the Last Year:    Transportation Needs:     Lack of Transportation (Medical):      Lack of Transportation (Non-Medical):    Physical Activity:     Days of Exercise per Week:     Minutes of Exercise per Session:    Stress:     Feeling of Stress :    Social Connections:     Frequency of Communication with Friends and Family:     Frequency of Social Gatherings with Friends and Family:     Attends Denominational Services:     Active Member of Clubs or Organizations:     Attends Club or Organization Meetings:     Marital Status:    Intimate Partner Violence:     Fear of Current or Ex-Partner:     Emotionally Abused:     Physically Abused:     Sexually Abused: ALLERGIES: Naproxen and Other medication    Review of Systems   Constitutional: Positive for fatigue. Negative for chills and fever. HENT: Negative for congestion and rhinorrhea. Eyes: Negative for redness and visual disturbance. Respiratory: Negative for cough and shortness of breath. Cardiovascular: Positive for chest pain. Negative for leg swelling. Gastrointestinal: Negative for abdominal pain, diarrhea, nausea and vomiting. Genitourinary: Negative for dysuria, flank pain, frequency, hematuria and urgency. Musculoskeletal: Negative for arthralgias, back pain, myalgias and neck pain. Skin: Negative for rash and wound. Allergic/Immunologic: Negative for immunocompromised state. Neurological: Positive for dizziness. Negative for headaches. Vitals:    06/02/21 1136   BP: (!) 145/81   Pulse: 76   Resp: 16   Temp: 98 °F (36.7 °C)   SpO2: 100%   Weight: 61.2 kg (135 lb)   Height: 5' 7\" (1.702 m)            Physical Exam  Vitals and nursing note reviewed. Constitutional:       General: She is not in acute distress. Appearance: She is well-developed. She is not diaphoretic. HENT:      Head: Normocephalic. Mouth/Throat:      Pharynx: No oropharyngeal exudate. Eyes:      General:         Right eye: No discharge. Left eye: No discharge. Pupils: Pupils are equal, round, and reactive to light. Comments: Horizontal Rightward nystagmus  Rightward gaze exacerbates dizziness   Cardiovascular:      Rate and Rhythm: Normal rate and regular rhythm. Heart sounds: Normal heart sounds. No murmur heard. No friction rub. No gallop. Pulmonary:      Effort: Pulmonary effort is normal. No respiratory distress. Breath sounds: Normal breath sounds. No stridor. No wheezing or rales. Abdominal:      General: Bowel sounds are normal. There is no distension. Palpations: Abdomen is soft. Tenderness:  There is no abdominal tenderness. There is no guarding or rebound. Musculoskeletal:         General: No deformity. Normal range of motion. Cervical back: Normal range of motion and neck supple. Skin:     General: Skin is warm and dry. Capillary Refill: Capillary refill takes less than 2 seconds. Findings: No rash. Neurological:      Mental Status: She is alert and oriented to person, place, and time. Comments: CN II-XII tested and intact  Speech is clear and fluid  Tongue protrusion normal  5/5 b/l strength with shoulder/elbow flexion and extension  Equal  strength  5/5 b/l strength with hip extension, knee flexion/extension  Symmetric dorsi and plantar-flexion of feet  Sensation intact in face and throughout all 4 extremities   No truncal ataxia      Psychiatric:         Behavior: Behavior normal.          EKG Interpretation:   ED Physician interpretation  nsr rate 67, normal axis normal intervals, no ST elevation or depression    Labs Reviewed:   Troponin negative, 2-hour troponin also negative  No leukocytosis or anemia  Normal electrolytes and renal function    Imaging Reviewed:   CT head negative  Chest x-ray negative      Course:    2:55 PM re-evaluated. Chest pain and tingling in arm completely gone however when I attempted to ambulate pt she was unsteady on feet. Will admit. Cannot fully rule out posterior stroke without MRI and further workup. Perfect Serve Consult for Admission  2:56 PM    ED Room Number: ER20/20  Patient Name and age:  Tan Pretzel 54 y.o.  female  Working Diagnosis:   1. Dizziness    2. Chest pain, unspecified type        COVID-19 Suspicion:  no  Sepsis present:  no  Reassessment needed: no  Code Status:  Full Code  Readmission: no  Isolation Requirements:  no  Recommended Level of Care:  telemetry  Department:[facility] ED - [phone]  Other:  54 y.o. female here with chest pain but also with L arm tingling and dizziness. Now NIHSS, not tpa candidate, CT head neg. Still off balance when walking. Very well could be vertigo but with associated L arm tingling (which has resolve) I would like to admit for TIA/CVA workup. MDM: Patient is a 49-year-old female who is relatively healthy presenting today with chest discomfort, left arm tingling and dizziness. She initially started to feel dizzy/lightheaded between 7 and 730 this morning therefore by the time I saw her at 12:03 PM she was outside the window for TPA (although NIH SS of 0, just some dizziness and subjective tingling no upper extremity). Van negative. No chest pain perspective she has had 2 normal EKGs and troponins. Chest x-ray negative. Suspect musculoskeletal etiology versus GI, low risk ACS at this time. Given that she is somewhat unstable on her feet there is still some concern for possible small posterior stroke versus TIA therefore I will admit her for further management and work-up. Aspirin given. Clinical Impression:     ICD-10-CM ICD-9-CM    1. Dizziness  R42 780.4    2.  Chest pain, unspecified type  R07.9 786.50            Disposition: Admit  Jony Barclay DO

## 2021-06-02 NOTE — ED NOTES
TRANSFER - OUT REPORT:    Verbal report given to Sheldon Herbert on [place] [facility]  being transferred to [facility] [phone] for routine progression of care       Report consisted of patients Situation, Background, Assessment and   Recommendations(SBAR). Information from the following report(s) SBAR, ED Summary, [facility] and Recent Results was reviewed with the receiving nurse. Opportunity for questions and clarification was provided.

## 2021-06-03 VITALS
OXYGEN SATURATION: 97 % | SYSTOLIC BLOOD PRESSURE: 140 MMHG | HEART RATE: 67 BPM | BODY MASS INDEX: 21.19 KG/M2 | HEIGHT: 67 IN | TEMPERATURE: 97.6 F | RESPIRATION RATE: 18 BRPM | DIASTOLIC BLOOD PRESSURE: 81 MMHG | WEIGHT: 135 LBS

## 2021-06-03 DIAGNOSIS — G89.29 CHRONIC NECK PAIN: ICD-10-CM

## 2021-06-03 DIAGNOSIS — G43.109 COMPLICATED MIGRAINE: ICD-10-CM

## 2021-06-03 DIAGNOSIS — R07.9 CHEST PAIN, UNSPECIFIED TYPE: ICD-10-CM

## 2021-06-03 DIAGNOSIS — M54.2 CHRONIC NECK PAIN: ICD-10-CM

## 2021-06-03 DIAGNOSIS — R42 DIZZINESS: ICD-10-CM

## 2021-06-03 LAB
ALBUMIN SERPL-MCNC: 3.3 G/DL (ref 3.5–5)
ALBUMIN/GLOB SERPL: 1.3 {RATIO} (ref 1.1–2.2)
ALP SERPL-CCNC: 66 U/L (ref 45–117)
ALT SERPL-CCNC: 18 U/L (ref 12–78)
ANION GAP SERPL CALC-SCNC: 6 MMOL/L (ref 5–15)
AST SERPL-CCNC: 16 U/L (ref 15–37)
ATRIAL RATE: 67 BPM
BASOPHILS # BLD: 0 K/UL (ref 0–0.1)
BASOPHILS NFR BLD: 1 % (ref 0–1)
BILIRUB SERPL-MCNC: 0.4 MG/DL (ref 0.2–1)
BUN SERPL-MCNC: 13 MG/DL (ref 6–20)
BUN/CREAT SERPL: 27 (ref 12–20)
CALCIUM SERPL-MCNC: 7.9 MG/DL (ref 8.5–10.1)
CALCULATED P AXIS, ECG09: 42 DEGREES
CALCULATED R AXIS, ECG10: 49 DEGREES
CALCULATED T AXIS, ECG11: 38 DEGREES
CHLORIDE SERPL-SCNC: 107 MMOL/L (ref 97–108)
CHOLEST SERPL-MCNC: 232 MG/DL
CO2 SERPL-SCNC: 27 MMOL/L (ref 21–32)
CREAT SERPL-MCNC: 0.49 MG/DL (ref 0.55–1.02)
DIAGNOSIS, 93000: NORMAL
DIFFERENTIAL METHOD BLD: NORMAL
EOSINOPHIL # BLD: 0.1 K/UL (ref 0–0.4)
EOSINOPHIL NFR BLD: 3 % (ref 0–7)
ERYTHROCYTE [DISTWIDTH] IN BLOOD BY AUTOMATED COUNT: 12.3 % (ref 11.5–14.5)
EST. AVERAGE GLUCOSE BLD GHB EST-MCNC: 105 MG/DL
GLOBULIN SER CALC-MCNC: 2.6 G/DL (ref 2–4)
GLUCOSE SERPL-MCNC: 92 MG/DL (ref 65–100)
HBA1C MFR BLD: 5.3 % (ref 4–5.6)
HCT VFR BLD AUTO: 36.8 % (ref 35–47)
HDLC SERPL-MCNC: 123 MG/DL
HDLC SERPL: 1.9 {RATIO} (ref 0–5)
HGB BLD-MCNC: 12 G/DL (ref 11.5–16)
IMM GRANULOCYTES # BLD AUTO: 0 K/UL (ref 0–0.04)
IMM GRANULOCYTES NFR BLD AUTO: 0 % (ref 0–0.5)
LDLC SERPL CALC-MCNC: 93.8 MG/DL (ref 0–100)
LYMPHOCYTES # BLD: 1.7 K/UL (ref 0.8–3.5)
LYMPHOCYTES NFR BLD: 39 % (ref 12–49)
MAGNESIUM SERPL-MCNC: 2.1 MG/DL (ref 1.6–2.4)
MCH RBC QN AUTO: 31.4 PG (ref 26–34)
MCHC RBC AUTO-ENTMCNC: 32.6 G/DL (ref 30–36.5)
MCV RBC AUTO: 96.3 FL (ref 80–99)
MONOCYTES # BLD: 0.4 K/UL (ref 0–1)
MONOCYTES NFR BLD: 10 % (ref 5–13)
NEUTS SEG # BLD: 2.1 K/UL (ref 1.8–8)
NEUTS SEG NFR BLD: 47 % (ref 32–75)
NRBC # BLD: 0 K/UL (ref 0–0.01)
NRBC BLD-RTO: 0 PER 100 WBC
P-R INTERVAL, ECG05: 166 MS
PHOSPHATE SERPL-MCNC: 4.1 MG/DL (ref 2.6–4.7)
PLATELET # BLD AUTO: 200 K/UL (ref 150–400)
PMV BLD AUTO: 9.8 FL (ref 8.9–12.9)
POTASSIUM SERPL-SCNC: 3.3 MMOL/L (ref 3.5–5.1)
PROT SERPL-MCNC: 5.9 G/DL (ref 6.4–8.2)
Q-T INTERVAL, ECG07: 426 MS
QRS DURATION, ECG06: 90 MS
QTC CALCULATION (BEZET), ECG08: 450 MS
RBC # BLD AUTO: 3.82 M/UL (ref 3.8–5.2)
SODIUM SERPL-SCNC: 140 MMOL/L (ref 136–145)
TRIGL SERPL-MCNC: 76 MG/DL (ref ?–150)
VENTRICULAR RATE, ECG03: 67 BPM
VLDLC SERPL CALC-MCNC: 15.2 MG/DL
WBC # BLD AUTO: 4.4 K/UL (ref 3.6–11)

## 2021-06-03 PROCEDURE — 83735 ASSAY OF MAGNESIUM: CPT

## 2021-06-03 PROCEDURE — 83036 HEMOGLOBIN GLYCOSYLATED A1C: CPT

## 2021-06-03 PROCEDURE — 74011250636 HC RX REV CODE- 250/636: Performed by: STUDENT IN AN ORGANIZED HEALTH CARE EDUCATION/TRAINING PROGRAM

## 2021-06-03 PROCEDURE — 74011250637 HC RX REV CODE- 250/637: Performed by: STUDENT IN AN ORGANIZED HEALTH CARE EDUCATION/TRAINING PROGRAM

## 2021-06-03 PROCEDURE — 99218 HC RM OBSERVATION: CPT

## 2021-06-03 PROCEDURE — 80053 COMPREHEN METABOLIC PANEL: CPT

## 2021-06-03 PROCEDURE — 99235 HOSP IP/OBS SAME DATE MOD 70: CPT | Performed by: FAMILY MEDICINE

## 2021-06-03 PROCEDURE — 85025 COMPLETE CBC W/AUTO DIFF WBC: CPT

## 2021-06-03 PROCEDURE — 80061 LIPID PANEL: CPT

## 2021-06-03 PROCEDURE — 96375 TX/PRO/DX INJ NEW DRUG ADDON: CPT

## 2021-06-03 PROCEDURE — 84100 ASSAY OF PHOSPHORUS: CPT

## 2021-06-03 PROCEDURE — 97116 GAIT TRAINING THERAPY: CPT

## 2021-06-03 PROCEDURE — 97161 PT EVAL LOW COMPLEX 20 MIN: CPT

## 2021-06-03 PROCEDURE — 36415 COLL VENOUS BLD VENIPUNCTURE: CPT

## 2021-06-03 RX ORDER — GUAIFENESIN 100 MG/5ML
81 LIQUID (ML) ORAL DAILY
Qty: 30 TABLET | Refills: 1 | Status: SHIPPED | OUTPATIENT
Start: 2021-06-03 | End: 2021-07-03

## 2021-06-03 RX ORDER — POTASSIUM CHLORIDE 750 MG/1
40 TABLET, FILM COATED, EXTENDED RELEASE ORAL
Status: COMPLETED | OUTPATIENT
Start: 2021-06-03 | End: 2021-06-03

## 2021-06-03 RX ORDER — GUAIFENESIN 100 MG/5ML
81 LIQUID (ML) ORAL DAILY
Status: DISCONTINUED | OUTPATIENT
Start: 2021-06-03 | End: 2021-06-03 | Stop reason: HOSPADM

## 2021-06-03 RX ORDER — GUAIFENESIN 100 MG/5ML
81 LIQUID (ML) ORAL DAILY
Qty: 30 TABLET | Refills: 0 | Status: SHIPPED | OUTPATIENT
Start: 2021-06-03 | End: 2021-06-03

## 2021-06-03 RX ORDER — POTASSIUM CHLORIDE 7.45 MG/ML
10 INJECTION INTRAVENOUS EVERY 4 HOURS
Status: DISCONTINUED | OUTPATIENT
Start: 2021-06-03 | End: 2021-06-03

## 2021-06-03 RX ADMIN — ASPIRIN 81 MG: 81 TABLET, CHEWABLE ORAL at 11:56

## 2021-06-03 RX ADMIN — CITALOPRAM HYDROBROMIDE 20 MG: 20 TABLET ORAL at 08:13

## 2021-06-03 RX ADMIN — POTASSIUM CHLORIDE 10 MEQ: 10 INJECTION, SOLUTION INTRAVENOUS at 08:43

## 2021-06-03 RX ADMIN — POTASSIUM CHLORIDE 40 MEQ: 750 TABLET, EXTENDED RELEASE ORAL at 09:31

## 2021-06-03 RX ADMIN — Medication 10 ML: at 05:35

## 2021-06-03 NOTE — DISCHARGE SUMMARY
[address] [facility] [address] [place] [address]   Office [phone]  Fax [phone]       Discharge / Transfer / Off-Service Note     Name: Christina Mcardle MRN: [id number]  Sex: Female   YOB: 1965  Age: 54 y.o. PCP: Tami Sampson MD     Date of admission: 6/2/2021  Date of discharge/transfer: 6/3/2021    Attending physician at admission: Dr. César Burris  Attending physician at discharge/transfer: Dr. César Burris  Resident physician at discharge/transfer: Lakia Rasheed MD     Consultants during hospitalization  Neurology NP--Diana Chambers      Admission diagnoses   Dizziness [R42]  Chest pain [R07.9]  Left arm numbness [R20.0]    Recommended follow-up after discharge    1. PCP--Dr. Michaela Gilliam  2. Follow up with Cardiology for dizziness  3. Follow up with ENT Dr. Yecenia Pleitez, [facility] for dizziness. 4. Follow up with Neurology Ayanna John NP for dizziness.        Things to follow up on with PCP:   - Please, follow up with dizziness outpatient. Medications:  - ASA 81 mg daily. - Continue home medications.       History of Present Illness    Christina Mcardle is a 54 y.o. female with known Migraine disorder, chronic dizziness, who anxiety who presents to the ER complaining of dizziness and L arm numbness . This morning around 7/730AM she reports she felt severe dizziness. She took a double dose of her home Florinef and reports she developed central chest pain with associate L arm numbness and tingling. She immediately went to the ED to be evaluated. Currently her CP and numbness have resolved however she still feels subjective dizziness and mild weakness. She denies any hx of  CVA, cough, SOB, fever, chills, abdominal pain, nausea or vomiting.     In the ER:     -vitals were remarkable for Temp 98F, HR 76, /81, RR 16 , SpO2 100%  on RA   -labs were remarkable for trop neg  -Imaging that was done includes CT head no acute process, CXR no acute process  -Treatment was ASA 325mg, 1L bolus, meclizine 50mg     EKG: NSR rate 80     Hospital course  Ms. Alberta Villeda is a stable 54year old female patient with PMH of migraine headaches, chronic dizziness, anxiety, chronic neck pain, hx of TIA in 2015 and hypothyroidism. Previous to admission patient was not wearing her compression stockings, missed a dose of Florinef and had poor oral intake. She stood up and felt sudden dizziness and left arm numbness which is consistent with her previous episodes of chronic hypotension probably exacerbated by the afore mentioned. Patient was admitted from 06/02-06/03 for dizziness and TIA rule out. Her imaging was negative for acute ischemic changes or bleeding   (CT head, CTA head/neck and brain MRI wnl). Patient did however present with horizontal nystagmus with right upward gaze unsure if this of acute onset or chronic but it was not previously seen in the notes from her Neurologist visits per chart review. Her cardiac enzymes, cxr and ekg were unremarkable for ACS or arrhythmias. Patient was evaluated by PT who had no further recommendations for therapy outpatient. Neurology recommended follow up outpatient for dizziness and contact information for Dr. Mitzy Jang was provided upon discharge. Patient was discharge on ASA 81 mg and advised to follow up with PCP Dr. Kalyan Pillai on June 9. At the time of discharge patient was asymptomatic and denied any headaches, numbness, weakness, chest pain or dizziness. During the course of this admission, the following conditions were addressed/managed;    Dizziness with L arm numbness:  in the setting of chronic dizziness  especially with turning her head to the right. It has improved since admission and Lt. Arm numbness has resolved. Patient cleared by PT and Neurology to be discharge home. .   - Continue with outpatient follow up with Neurology and ENT. - Stroke precautions provided in the discharge instructions. - ASA 81 mg daily. Chest pain: resolved.  Negative workup for ACS. - Patient can follow up with Cardiology outpatient if she desires. Dr. Thi Casarez has treated patient in the past. Contact information provided.       Migraine disorder: currently treated with Botox . Follows with  Dennis Rudolph NP last seen on 04/20/2021.    - Continue home medications Fiorcet, Nurtec, and Cambia. - F/u outpatient with Neurology.      Anxiety patient 'd and medication is appropriate. Last refill in 03/2020   -Continue home Celexa 20mg  - Continue Klonopin 0.5mg qhs prn         Patient Josh Ding will be discussed Dr. Steph Pritchett. Physical exam at discharge:    General: No acute distress. Alert. Cooperative. HEENT: PPERLA, +horizontal upward nystagmus. Respiratory: CTAB. No w/r/r/c.   Cardiovascular: Normal S1,S2. No m/r/g.   GI: Nondistended.+ bowel sounds. Nontender. No rebound tenderness or guarding. Extremities: No LE edema. Skin: Warm, dry. No rashes. Neuro: Alert and oriented. Streght 5/5. Normal sensitivity. No gross focal neurologic changes. CN I-XII wnl. Condition at discharge: Stable. Labs  Recent Labs     06/03/21  0404 06/02/21  1146   WBC 4.4 4.5   HGB 12.0 12.9   HCT 36.8 38.8    219     Recent Labs     06/03/21  0404 06/02/21  1146    139   K 3.3* 3.8    104   CO2 27 29   BUN 13 14   CREA 0.49* 0.63   GLU 92 91   CA 7.9* 9.0   MG 2.1 2.0   PHOS 4.1  --      Recent Labs     06/03/21  0404 06/02/21  1146   ALT 18 25   AP 66 86   TBILI 0.4 0.3   TP 5.9* 7.4   ALB 3.3* 4.5   GLOB 2.6 2.9     Recent Labs     06/02/21  1959 06/02/21  1407 06/02/21  1146   TROIQ <0.05 <0.05 <0.05     Cultures  · none    Procedures / Diagnostic Studies  · Imaging as showed below. EXAM:  CT HEAD WO CONT     INDICATION:   dizziness     COMPARISON: CT head 9/24/2015.     TECHNIQUE: Unenhanced CT of the head was performed using 5 mm images. Brain and  bone windows were generated.   CT dose reduction was achieved through use of a  standardized protocol tailored for this examination and automatic exposure  control for dose modulation.      FINDINGS:  The ventricles are normal in size and position. Basilar cisterns are patent. No  midline shift. There is no evidence of acute infarct, hemorrhage, or extraaxial  fluid collection. Unchanged prominent left retrocerebellar CSF.     The paranasal sinuses, mastoid air cells, and middle ears are clear. The orbital  contents are within normal limits. There are no significant osseous or  extracranial soft tissue lesions.     IMPRESSION  1. No evidence of acute intracranial abnormality.     EXAM:  CT HEAD WO CONT     INDICATION:   dizziness     COMPARISON: CT head 9/24/2015.     TECHNIQUE: Unenhanced CT of the head was performed using 5 mm images. Brain and  bone windows were generated. CT dose reduction was achieved through use of a  standardized protocol tailored for this examination and automatic exposure  control for dose modulation.      FINDINGS:  The ventricles are normal in size and position. Basilar cisterns are patent. No  midline shift. There is no evidence of acute infarct, hemorrhage, or extraaxial  fluid collection. Unchanged prominent left retrocerebellar CSF.     The paranasal sinuses, mastoid air cells, and middle ears are clear. The orbital  contents are within normal limits. There are no significant osseous or  extracranial soft tissue lesions.     IMPRESSION  1. No evidence of acute intracranial abnormality. Narrative & Impression   PRELIMINARY REPORT     No acute or emergent pathology.  No acute infarct, midline shift, or mass effect.     Preliminary report was provided by Dr. Arely Deshpande, the on-call radiologist, at 8:39  PM     Final report to follow.     *END PRELIMINARY REPORT*     EXAM:  MRI BRAIN W WO CONT     INDICATION:    CVA rule out     COMPARISON:  CTA head neck 6/2/2021, MRI brain 2/3/2020.     CONTRAST: 12 ml of ProHance.     TECHNIQUE:    Multiplanar multisequence acquisition without and with contrast of the brain.     FINDINGS:  The ventricles are normal in size and position. Stable minimal (less than 5)  tiny nonspecific T2/FLAIR white matter hyperintensities, of doubtful clinical  significance. The remaining brain parenchyma has normal signal characteristics. Stable prominent retrocerebellar CSF, likely representing zee cisterna magna. There is no acute infarct, hemorrhage, extra-axial fluid collection, or mass  effect. There is no cerebellar tonsillar herniation. Expected arterial  flow-voids are present. No evidence of abnormal enhancement.     The paranasal sinuses, mastoid air cells, and middle ears are clear. The orbital  contents are within normal limits. No significant osseous or scalp lesions are  identified.     IMPRESSION  1.  No acute or significant intracranial abnormality.             Chronic diagnoses   Problem List as of 6/3/2021 Date Reviewed: 6/3/2021        Codes Class Noted - Resolved    Complicated migraine CKY-[id number]: G43.109  ICD-9-CM: 346.00  6/3/2021 - Present        Dizziness ICD-10-CM: R42  ICD-9-CM: 780.4  6/2/2021 - Present        Chest pain ICD-10-CM: R07.9  ICD-9-CM: 786.50  6/2/2021 - Present        Vaginal cuff dehiscence ICD-10-CM: T81.31XA  ICD-9-CM: 998.32  12/8/2019 - Present        S/P hysterectomy ICD-10-CM: Z90.710  ICD-9-CM: V88.01  10/16/2019 - Present        Pelvic congestion syndrome ICD-10-CM: N94.89  ICD-9-CM: 625.5  9/26/2019 - Present        Burning sensation of feet ICD-10-CM: R20.8  ICD-9-CM: 782.0  10/31/2016 - Present        TIA (transient ischemic attack) ICD-10-CM: G45.9  ICD-9-CM: 435.9  6/27/2015 - Present        * (Principal) Paresthesia of left arm ICD-10-CM: R20.2  ICD-9-CM: 782.0  6/27/2015 - Present        Chronic neck pain ICD-10-CM: M54.2, G89.29  ICD-9-CM: 723.1, 338.29  6/27/2015 - Present        Anxiety ICD-10-CM: F41.9  ICD-9-CM: 300.00  5/21/2015 - Present        GERD (gastroesophageal reflux disease) ICD-10-CM: K21.9  ICD-9-CM: 530.81  3/12/2014 - Present        Hypothyroidism ICD-10-CM: E03.9  ICD-9-CM: 244.9  12/19/2012 - Present        Panic disorder without agoraphobia ICD-10-CM: F41.0  ICD-9-CM: 300.01  5/9/2012 - Present        RESOLVED: Screening for thyroid disorder ICD-10-CM: Z13.29  ICD-9-CM: V77.0  10/31/2016 - 9/25/2019        RESOLVED: Injury of elbow, left ICD-10-CM: X76.943R  ICD-9-CM: 959.3  1/2/2015 - 6/3/2021        RESOLVED: Swelling of joint, elbow, left ICD-10-CM: M25.422  ICD-9-CM: 719.02  1/2/2015 - 6/3/2021              Discharge/Transfer Medications  Current Discharge Medication List      START taking these medications    Details   aspirin 81 mg chewable tablet Take 1 Tablet by mouth daily for 30 days. Qty: 30 Tablet, Refills: 1  Start date: 6/3/2021, End date: 7/3/2021         CONTINUE these medications which have NOT CHANGED    Details   fludrocortisone (FLORINEF) 0.1 mg tablet Take 0.05 mg by mouth daily. tiZANidine (ZANAFLEX) 2 mg tablet 1-2 tablets, 1-2 hours prior to bedtime. Qty: 30 Tab, Refills: 1    Associated Diagnoses: Upper back pain on right side; Neck pain; Acute pain of right shoulder; Muscle spasm      citalopram (CELEXA) 20 mg tablet Take 1 Tab by mouth daily. Qty: 90 Tab, Refills: 1    Associated Diagnoses: Anxiety      Diclofenac Potassium (Cambia) 50 mg pwpk 1 packet at onset and repeat in 2 hours x 1 prn, max 2 in 24 hours. Qty: 9 Packet, Refills: 5      rimegepant (Nurtec ODT) 75 mg disintegrating tablet Take 1 tablet at HA onset as needed. Max 1 tablet in 24 hours.   Qty: 8 Tab, Refills: 5      OTHER Herbal supplement for mood -macka      butalbital-acetaminophen-caffeine (FIORICET, ESGIC) -40 mg per tablet TAKE 1 TO 2 TABLETS BY MOUTH EVERY 8 HOURS AS NEEDED  Qty: 40 Tab, Refills: 5    Associated Diagnoses: Intractable migraine without status migrainosus, unspecified migraine type      valACYclovir (Valtrex) 500 mg tablet Take 1 Tab by mouth as needed (take one as needed). Qty: 30 Tab, Refills: 12      estradioL (VAGIFEM) 10 mcg tab vaginal tablet Insert 1 Tab into vagina daily. Qty: 30 Tab, Refills: 12      onabotulinumtoxinA (Botox) 200 unit injection Inject 155 units IM into 31 FDA indicated and approved sites in the head, face, neck every 3 months for chronic migraine. Qty: 1 Vial, Refills: 5    Associated Diagnoses: Chronic migraine      clonazePAM (KlonoPIN) 0.5 mg tablet Take 1 Tab by mouth nightly as needed (anxiety). Max Daily Amount: 0.5 mg. Indications: panic disorder  Qty: 20 Tab, Refills: 0    Associated Diagnoses: Anxiety; Panic disorder without agoraphobia      fluticasone propionate (FLONASE ALLERGY RELIEF) 50 mcg/actuation nasal spray 2 Sprays by Both Nostrils route daily. Biotin 2,500 mcg cap Take 2,500 mcg by mouth. CALCIUM PO Take 1,200 mg by mouth daily. cholecalciferol, vitamin D3, (VITAMIN D3 PO) Take 50 mcg by mouth. Diet:  Regular diet.     Activity:  As tolerated     Disposition: Home or Self Care    Discharge instructions to patient/family  Please seek medical attention for any new or worsening symptoms particularly fever, chest pain, shortness of breath, abdominal pain, nausea, vomiting    Follow up plans/appointments  Follow-up Information     Follow up With Specialties Details Why Contact Info    Juliet Mishra MD Otolaryngology Schedule an appointment as soon as possible for a visit in 1 week to follow for dizziness; ENT vestibular specialist  [address] [address]  [phone]      Nathan Mortensen NP Nurse Practitioner Schedule an appointment as soon as possible for a visit to follow up on migraine and dizziness [address] [address]      Roxana Rene MD Pediatric Medicine, Family Medicine Go on 6/9/2021 PCP follow up after admission at 11:15 am [address] [phone] Hailey Ely MD  Family Medicine Resident       For Billing    Chief Complaint   Patient presents with   [employer] Chest Pain       Hospital Problems  Date Reviewed: 6/3/2021        Codes Class Noted POA    Complicated migraine JGE-09-WY: G43.109  ICD-9-CM: 346.00  6/3/2021 Unknown        Dizziness ICD-10-CM: R42  ICD-9-CM: 780.4  6/2/2021 Unknown        Chest pain ICD-10-CM: R07.9  ICD-9-CM: 786.50  6/2/2021 Unknown        TIA (transient ischemic attack) ICD-10-CM: G45.9  ICD-9-CM: 435.9  6/27/2015 Yes        * (Principal) Paresthesia of left arm ICD-10-CM: R20.2  ICD-9-CM: 782.0  6/27/2015 Yes        Chronic neck pain ICD-10-CM: M54.2, G89.29  ICD-9-CM: 723.1, 338.29  6/27/2015 Yes        Anxiety ICD-10-CM: F41.9  ICD-9-CM: 300.00  5/21/2015 Yes        GERD (gastroesophageal reflux disease) ICD-10-CM: K21.9  ICD-9-CM: 530.81  3/12/2014 Yes        Hypothyroidism ICD-10-CM: E03.9  ICD-9-CM: 244.9  12/19/2012 Yes        Panic disorder without agoraphobia ICD-10-CM: F41.0  ICD-9-CM: 300.01  5/9/2012 Yes

## 2021-06-03 NOTE — PROGRESS NOTES
PHYSICAL THERAPY EVALUATION/DISCHARGE  Patient: Pranav Salas (46 y.o. female)  Date: 6/3/2021  Primary Diagnosis: Dizziness [R42]  Chest pain [R07.9]  Left arm numbness [R20.0]       Precautions:   WBAT, Fall      ASSESSMENT  Based on the objective data described below, the patient presents with chest pain, dizziness and arm numbness that has resolved. She underwent a CT and an MRI which are negative for acute infarcts. Patient states chest pain and arm numbness resolved but still with intermittent dizziness, that is non-vertigo in nature (not room spinning). Her vitals were stable throughout- see below. She was able to ambulate in hallway without loss of balance. Able to negotiate 4 steps with 1 hand rails MOD I. Patient does have ankle strategy and normal postural sway with balance testing, without loss of balance. Have no further needs while in the hospital or at discharge. Functional Outcome Measure: The patient scored Total: 49/56 on the Cline Balance Assessment which is indicative of low fall risk. Other factors to consider for discharge: patient Independent and driving prior to admission     Further skilled acute physical therapy is not indicated at this time. PLAN :  Recommendation for discharge: (in order for the patient to meet his/her long term goals)  No skilled physical therapy/ follow up rehabilitation needs identified at this time. This discharge recommendation:  A follow-up discussion with the attending provider and/or case management is planned    IF patient discharges home will need the following DME: none         SUBJECTIVE:   Patient stated my blood pressure always drop.  blood pressure was stable, see below    OBJECTIVE DATA SUMMARY:   HISTORY:    Past Medical History:   Diagnosis Date    Anxiety     Arthritis     Cancer ([facility] [address]     BASAL CELL SKIN CANCER ON CHEST    Chronic neck pain 6/27/2015    Fatigue     GERD (gastroesophageal reflux disease) 3/12/2014    Headache     Hiatal hernia     Injury of elbow, left 1/2/2015    Panic disorder     PUD (peptic ulcer disease)     Swelling of joint, elbow, left 1/2/2015    TIA (transient ischemic attack) 6/27/2015     Past Surgical History:   Procedure Laterality Date    HX GYN      BTL    HX HEENT      HX WISDOM TEETH EXTRACTION         Prior level of function: see above  Personal factors and/or comorbidities impacting plan of care: see below    [address]: Private residence  # Steps to Enter: 9  Rails to Enter: Yes  Hand Rails : Right  One/Two Story Residence: One story  Living Alone: Yes  Support Systems: Spouse/Significant Other/Partner  Patient Expects to be Discharged to[de-identified] House  Current DME Used/Available at Home: None    EXAMINATION/PRESENTATION/DECISION MAKING:   Vitals:    06/03/21 0702 06/03/21 0727 06/03/21 0854 06/03/21 0902   BP:  121/76 (!) 139/90 (!) 140/81   BP 1 Location:  Left upper arm Left arm    BP Patient Position:  At rest At rest Comment: post activity   Pulse: 66 73 77 67   Temp:  97.6 °F (36.4 °C)     Resp:  18     Height:       Weight:       SpO2:  97%         Critical Behavior:  Neurologic State: Alert, Appropriate for age, Eyes open spontaneously  Orientation Level: Appropriate for age, Oriented X4  Cognition: Appropriate decision making, Appropriate for age attention/concentration, Appropriate safety awareness, Follows commands     Hearing: Auditory  Auditory Impairment: None  Skin:  All exposed intact  Edema: none noted  Range Of Motion:  AROM: Within functional limits           PROM: Within functional limits           Strength:    Strength:  Within functional limits                    Tone & Sensation:                                  Coordination:  Coordination: Within functional limits  Vision:      Functional Mobility:  Bed Mobility:  Rolling: Independent  Supine to Sit: Independent  Sit to Supine: Independent     Transfers:  Sit to Stand: Independent  Stand to Sit: Independent        Bed to Chair: Independent              Balance:      Ambulation/Gait Training:  Distance (ft): 250 Feet (ft)  Assistive Device: Gait belt  Ambulation - Level of Assistance: Independent     Gait Description (WDL): Exceptions to WDL                                          Stairs:  Number of Stairs Trained: 4  Stairs - Level of Assistance: Modified independent   Rail Use: Left     Therapeutic Exercises:       Functional Measure  Fan Balance Test:    Sitting to Standin  Standing Unsupported: 4  Sitting with Back Unsupported: 4  Standing to Sittin  Transfers: 4  Standing Unsupported with Eyes Closed: 4  Standing Unsupported with Feet Together: 4  Reach Forward with Outstretched Arm: 4   Object: 4  Turn to Look Over Shoulders: 4  Turn 360 Degrees: 2  Alternate Foot on Step/Stool: 4  Standing Unsupported One Foot in Front: 1  Stand on One Le  Total: 49/56         56=Maximum possible score;   0-20=High fall risk  21-40=Moderate fall risk   41-56=Low fall risk        Physical Therapy Evaluation Charge Determination   History Examination Presentation Decision-Making   MEDIUM  Complexity : 1-2 comorbidities / personal factors will impact the outcome/ POC  LOW Complexity : 1-2 Standardized tests and measures addressing body structure, function, activity limitation and / or participation in recreation  LOW Complexity : Stable, uncomplicated  Other outcome measures FAN  LOW       Based on the above components, the patient evaluation is determined to be of the following complexity level: LOW     Pain Rating:  None reported    Activity Tolerance:   Good    After treatment patient left in no apparent distress:   Supine in bed, Call bell within reach and Bed / chair alarm activated    COMMUNICATION/EDUCATION:   The patients plan of care was discussed with: Registered nurse. Patient was educated regarding her deficit(s) of dizziness as this relates to her diagnosis of TIA vs Migrane. She demonstrated Good understanding as evidenced by verbal feedback. Fall prevention education was provided and the patient/caregiver indicated understanding., Patient/family have participated as able in goal setting and plan of care. and Patient/family agree to work toward stated goals and plan of care.     Thank you for this referral.  John Mei, PT, DPT   Time Calculation: 18 mins

## 2021-06-03 NOTE — PROGRESS NOTES
10:24 AM    CM reviewed EMR and met with patient and patient's significant other in room to complete the initial evaluation. Confirmed charted demographics. Observation notice provided in writing to patient and/or caregiver as well as verbal explanation of the policy. Patients who are in outpatient status also receive the Observation notice. Patient has received notice and or patient representative has received via secure email, fax, or certified mail based on patient representative's preference. Reason for Admission:  Dizziness/chest pain                     RUR Score:    Observation status                 Plan for utilizing home health:     Has not used in the past- is independent     PCP: First and Last name:  Mitzy Erazo MD     Name of Practice: [address] [place] [address]   Are you a current patient: Yes/No: Yes   Approximate date of last visit: 4/14/21   Can you participate in a virtual visit with your PCP:                     Current Advanced Directive/Advance Care Plan: Full Code      Healthcare Decision Maker:   Click here to complete [address] including selection of the Healthcare Decision Maker Relationship (ie \"Primary\")  Rivas Cornejo- daughter- [phone]                    Transition of Care Plan:                    Home:  One story home with 9 entry steps  DME: None  PTA:  Independent with all ADL's and iADL's  RX:  [employer] but would like to switch to [employer]    1). Patient admitted for medical management  2). PT/OT evaluations  3). Family will transport at dc  4). CM will follow for dc needs    [address] [facility] Interventions  PCP Verified by CM: Yes  Last Visit to PCP: 04/14/21  Mode of Transport at Discharge:  Other (see comment) (Significant other will transport at dc)  Transition of Care Consult (CM Consult): Discharge Planning  Discharge Durable Medical Equipment: No  Physical Therapy Consult: Yes  Occupational Therapy Consult: Yes  Speech Therapy Consult: No  Current Support Network:  Other  Confirm Follow Up Transport: Self  Discharge Location  Discharge Placement: Home with family assistance

## 2021-06-03 NOTE — DISCHARGE INSTRUCTIONS
HOME DISCHARGE INSTRUCTIONS    Aly Castillo / [id number] : 1965    Admission date: 2021 Discharge date: 6/3/2021 7:21 AM     Please bring this form with you to show your care provider at your follow-up appointment. Primary care provider:  Dalila Martinez MD    Discharging provider:  Waqas Vivar MD  - Family Medicine Resident  Tawanna Arredondo MD - Family Medicine Attending      You have been admitted to the hospital with the following diagnoses:    ACUTE DIAGNOSES:  Dizziness [R42]  Chest pain [R07.9]  Left arm numbness [R20.0]    . . . . . . . . . . . . . . . . . . . . . . . . . . . . . . . . . . . . . . . . . . . . . . . . . . . . . . . . . . . . . . . . . . . . . . . .   You are well enough to be discharged from the hospital. However, because you were inpatient in a hospital, you are at greater risk of having been exposed to the coronavirus. PLEASE stay inside and self-quarantine for 14 days to prevent further spread of the coronavirus. . . . . . . . . . . . . . . . . . . . . . . . . . . . . . . . . . . . . . . . . . . . . . . . . . . . . . . . . . . . . . . . . . . . . . . . Delsa Severance START taking Aspirin 81mg once daily for 30 days   . . . . . . . . . . . . . . . . . . . . . . . . . . . . . . . . . . . . . . . . . . . . . . . . . . . . . . . . . . . . . . . . . . . . . . . Delsa Severance Appointments  Follow up with Neurologist to follow up for migraines. - Follow up with ENT (ear, nose, throat specialist) to follow up on vertigo and dizziness. - If you want to  Follow with Cardiology to follow up dizziness and chest pain. Follow-up tests needed: NONE    Pending test results: At the time of your discharge the following test results are still pending: none. Please make sure you review these results with your outpatient follow-up provider(s).     DIET/what to eat:  Regular Diet    ACTIVITY:  Activity as tolerated    Specific symptoms to watch for: chest pain, loss of consciousness, weakness, numbness, shortness of breath, fever, chills, change in mentation, falling, bleeding     What to do if new or unexpected symptoms occur? If you experience any of the above symptoms (or should other concerns or questions arise after discharge) please call your primary care physician. Return to the emergency room if you cannot get hold of your doctor. · It is very important that you keep your follow-up appointment(s). qq  · Please bring discharge papers, medication list (and/or medication bottles) to your follow-up appointments for review by your outpatient provider(s). · Please check the list of medications and be sure it includes every medication (even non-prescription medications) that your provider wants you to take. · It is important that you take the medication exactly as they are prescribed. · Keep your medication in the bottles provided by the pharmacist and keep a list of the medication names, dosages, and times to be taken in your wallet. · Do not take other medications without consulting your doctor. · If you have any questions about your medications or other instructions, please talk to your nurse or care provider before you leave the hospital.     Information obtained by:     I understand that if any problems occur once I am at home I am to contact my physician. These instructions were explained to me and I had the opportunity to ask questions. I understand and acknowledge receipt of the instructions indicated above.                                                                                                                                                Physician's or R.N.'s Signature                                                                  Date/Time                                                                                                                                              Patient or Representative Signature Date/Time

## 2021-06-03 NOTE — PROGRESS NOTES
Discharge reviewed with patient, verbalizes understanding. Peripheral IV removed. New med change reviewed with patient. Aware of follow up appointments to schedule and appointment with PCP.

## 2021-06-03 NOTE — PROGRESS NOTES
Orders received, chart reviewed and patient evaluated by physical therapy. Pending progression with skilled acute physical therapy, recommend:  No skilled physical therapy/ follow up rehabilitation needs identified at this time. Recommend with nursing OOB to chair 3x/day and walking daily . Thank you for completing as able in order to maintain patient strength, endurance and independence. Full evaluation to follow.    Rios Patel PT,DPT,NCS

## 2021-06-03 NOTE — PROGRESS NOTES
Bedside shift change report given to Mila Parra RN (oncoming nurse) by Mary Ellen Kilpatrick RN (offgoing nurse). Report included the following information SBAR, Kardex, Intake/Output, MAR and Recent Results.

## 2021-06-03 NOTE — PROGRESS NOTES
Occupational Therapy  Order received and chart reviewed, attempted to see however patient discharged from hospital, noted resolution of chest pain and arm numbness, however with continued dizziness. MRI/CT negative and to follow up with ENT.    Anusha Baker OTR/MIKAELA

## 2021-06-04 ENCOUNTER — PATIENT OUTREACH (OUTPATIENT)
Dept: CASE MANAGEMENT | Age: 56
End: 2021-06-04

## 2021-06-04 NOTE — PROGRESS NOTES
Care Transitions Outreach Attempt    Call within 2 business days of discharge: Yes   Attempted to reach patient for transitions of care follow up. Unable to reach patient. Patient: Vinnie Martinez Patient : 1965 MRN: [id number]    Last Discharge [facility] Facility       Complaint Diagnosis Description Type Department Provider    21 Chest Pain Dizziness . .. ED to Hosp-Admission (Discharged) (ADMIT) Guerda Powers MD; Poonam Acosta,... Was this an external facility discharge?  No   Noted following upcoming appointments from discharge chart review:   [facility] follow up appointment(s):   Future Appointments   Date Time Provider Steven Richardson   2021 11:15 AM Anabelle Marc MD PMA BS AMB   2021  1:00 PM Sharad Phillips NP NEUR[facility] BS AMB   2021  8:40 AM Kelsey Nazario MD CAV[facility] BS AMB

## 2021-06-14 ENCOUNTER — PATIENT OUTREACH (OUTPATIENT)
Dept: CASE MANAGEMENT | Age: 56
End: 2021-06-14

## 2021-06-24 ENCOUNTER — PATIENT OUTREACH (OUTPATIENT)
Dept: CASE MANAGEMENT | Age: 56
End: 2021-06-24

## 2021-06-24 NOTE — PROGRESS NOTES
06/24/21   Care Transitions Outreach Attempt-LMTCB.     Cecilia Davey MSN, RN, CCM / Care Transition Nurse / [phone]

## 2021-06-29 RX ORDER — ONABOTULINUMTOXINA 200 [USP'U]/1
INJECTION, POWDER, LYOPHILIZED, FOR SOLUTION INTRADERMAL; INTRAMUSCULAR
Qty: 200 UNITS | Refills: 5 | Status: SHIPPED | OUTPATIENT
Start: 2021-06-29 | End: 2022-01-27 | Stop reason: SDUPTHER

## 2021-07-06 ENCOUNTER — PATIENT OUTREACH (OUTPATIENT)
Dept: CASE MANAGEMENT | Age: 56
End: 2021-07-06

## 2021-07-06 NOTE — PROGRESS NOTES
Patient has graduated from the Transitions of Care Coordination  program on 7/6/21. I did not talk to patient even with multiple tries. Care management goals have been completed. Patient was not referred to the [facility] team for further management. Goals Addressed    None         Patient has Care Transition Nurse's contact information for any further questions, concerns, or needs.   Patients upcoming visits:    Future Appointments   Date Time Provider Steven Debra   7/20/2021  1:00 PM Kath Phillips NP NEUR[facility] BS AMB   8/12/2021  8:40 AM Basia Nazario MD [facility] BS AMB

## 2021-07-09 ENCOUNTER — TELEPHONE (OUTPATIENT)
Dept: NEUROLOGY | Age: 56
End: 2021-07-09

## 2021-07-09 NOTE — TELEPHONE ENCOUNTER
[address]  FAXED CLINICALS TO [facility] AT [phone]     EFFECTIVE 4/2/21 TO 4/1/22 approved     REF# [id number]

## 2021-07-19 ENCOUNTER — TELEPHONE (OUTPATIENT)
Dept: NEUROLOGY | Age: 56
End: 2021-07-19

## 2021-07-19 NOTE — TELEPHONE ENCOUNTER
Called [facility] and they are set to delivery just needed pre scriber agreement signed she is faxing it today

## 2021-07-19 NOTE — TELEPHONE ENCOUNTER
----- Message from Alex Castillo sent at 7/16/2021  4:04 PM EDT -----  Regarding: NP, Jacqueline/Telephone  Appointment not available    Caller's first and last name and relationship to patient (if not the patient):  PT      Best contact number:  [phone]      Preferred date and time:        Scheduled appointment date and time:  Canc/r/s Botox Tx on Tues, 07/20/21 at 1:00PM      Reason for appointment:  Botox Tx  \"No Covid\"      Details to clarify the request:      Vernal Fallen

## 2021-08-10 ENCOUNTER — OFFICE VISIT (OUTPATIENT)
Dept: NEUROLOGY | Age: 56
End: 2021-08-10
Payer: COMMERCIAL

## 2021-08-10 VITALS
OXYGEN SATURATION: 98 % | SYSTOLIC BLOOD PRESSURE: 118 MMHG | HEART RATE: 81 BPM | HEIGHT: 67 IN | DIASTOLIC BLOOD PRESSURE: 82 MMHG | BODY MASS INDEX: 21.19 KG/M2 | RESPIRATION RATE: 12 BRPM | WEIGHT: 135 LBS

## 2021-08-10 PROCEDURE — 64615 CHEMODENERV MUSC MIGRAINE: CPT | Performed by: NURSE PRACTITIONER

## 2021-08-10 NOTE — PROGRESS NOTES
[facility]  OFFICE PROCEDURE PROGRESS NOTE        Chart reviewed for the following:   I, [de-identified] M TERRELL Phillips, have reviewed the History, Physical and updated the Allergic reactions for [facility] performed immediately prior to start of procedure:   I, [de-identified] M TERRELL Phillips, have performed the following reviews on Bethel Waite prior to the start of the procedure:            * Patient was identified by name and date of birth   * Agreement on procedure being performed was verified  * Risks and Benefits explained to the patient  * Procedure site verified and marked as necessary  * Patient was positioned for comfort  * Consent was signed and verified     Time: 1500      Date of procedure: 8/10/2021    Procedure performed by:  Juventino Hubbard NP    Provider assisted by: None    Patient assisted by: None    How tolerated by patient: tolerated the procedure well with no complications    Post Procedural Pain Scale: 2 - Hurts Little Bit    Comments: None      Botox Injection Note       Indication: patient has chronic recurrent migraine, has 7-10 less migraine days per month with botox injections    Procedure:   Botox concentration: 200 units in 4 ml of preservative-free normal saline. 31 sites injections, distribution as follow      Units/site  Sites Sides Subtotal    Procerus 5 1 1 5    5 1 2 10   Frontalis 5 2 2 20   Temporalis 5 4 2 40   Occipitalis 5 3 2 30   Upper cervical paraspinalis 5 2 2 20   Trapezius 5 3 2 30         200 units Botox were reconstituted, 155 units injected as above and the remainder was unavoidably wasted.      Patient tolerated procedure well.     _____________________________   Elvis Max     Lot [id number]  Ex 04 2024

## 2021-08-20 RX ORDER — DICLOFENAC POTASSIUM 50 MG/1
POWDER, FOR SOLUTION ORAL
Qty: 9 PACKET | Refills: 5 | Status: SHIPPED | OUTPATIENT
Start: 2021-08-20

## 2021-08-20 NOTE — TELEPHONE ENCOUNTER
Caller (if not patient):   PT       Relationship of caller (if not patient):       Best contact number(s):  [phone]       Name of medication and dosage if known:  \"Diclofenac Potassium (Cambia) 50mg\"       Is patient out of this medication (yes/no):  No       Pharmacy name: [facility], [address] [place] [address] listed in chart? (yes/no): [address] [place] phone number:  [phone]       Details to clarify the request:   Pt stated, she requested this Rx 2 days ago

## 2021-09-20 ENCOUNTER — OFFICE VISIT (OUTPATIENT)
Dept: CARDIOLOGY CLINIC | Age: 56
End: 2021-09-20
Payer: COMMERCIAL

## 2021-09-20 VITALS
BODY MASS INDEX: 21.14 KG/M2 | HEIGHT: 67 IN | OXYGEN SATURATION: 98 % | SYSTOLIC BLOOD PRESSURE: 118 MMHG | DIASTOLIC BLOOD PRESSURE: 78 MMHG | HEART RATE: 76 BPM

## 2021-09-20 DIAGNOSIS — F41.9 ANXIETY: ICD-10-CM

## 2021-09-20 DIAGNOSIS — R25.2 LEG CRAMPING: ICD-10-CM

## 2021-09-20 DIAGNOSIS — I95.1 ORTHOSTATIC HYPOTENSION: ICD-10-CM

## 2021-09-20 DIAGNOSIS — R00.2 PALPITATIONS: Primary | ICD-10-CM

## 2021-09-20 PROCEDURE — 99214 OFFICE O/P EST MOD 30 MIN: CPT | Performed by: INTERNAL MEDICINE

## 2021-09-20 RX ORDER — ASPIRIN 81 MG/1
81 TABLET ORAL DAILY
COMMUNITY

## 2021-09-20 RX ORDER — CALCIUM CARBONATE/VITAMIN D3 600 MG-125
TABLET ORAL
COMMUNITY

## 2021-09-20 RX ORDER — GLUCOSAM/CHONDRO/HERB 149/HYAL 750-100 MG
1 TABLET ORAL DAILY
COMMUNITY
End: 2022-02-27 | Stop reason: ALTCHOICE

## 2021-09-20 NOTE — PROGRESS NOTES
Room 7    -Monitor review     -stressf situation happen on the 4th, extremities numb, got weak, scared she was gonna fall, and palps      Visit Vitals  /78 (BP 1 Location: Left upper arm, BP Patient Position: Sitting)   Pulse 76   Ht 5' 7\" (1.702 m)   LMP 08/19/2010   SpO2 98%   BMI 21.14 kg/m²         Chest pain: no  Shortness of breath: no  Edema: no  Palpitations: no  Dizziness: no    New diagnosis/Surgeries: no    ER/Hospitalizations: [facility] 6-2 to 6-3, dizziness and L arm numbness    Refills: no

## 2021-09-20 NOTE — PROGRESS NOTES
Margot Jain MD    [id number] [facility] [address] [facility]    Office [phone],CXP [phone]      Jennifer Price is a 54 y.o. female is here for f/u visit. CC - as in     Dear Ms Inocencio Isabel had the pleasure of seeing Ms uH Hamm in the office today.        Assessment:     Palpitatons - pt called on 9/10/21. Attributes it to anxiety. Dizziness  Hx of Anxiety  ? Orthostatic Hypotension     Plan:        Continue florinef  ( Takes 0.1 mg 1/2 tab daily )  Compression stockings- had pain while wearing it -  Hx of cramping sensation both lower extremities left more than right. Will check ANG. Aggressive CV risk factor modification. F/u based on cardiac w/u/6 months    Patient understands the plan. All questions were answered to the patient's satisfaction. Medication Side Effects and Warnings were discussed with patient: yes  Patient Labs were reviewed and or requested:  yes  Patient Past Records were reviewed and or requested: yes    I appreciate the opportunity to be involved in [place]. See note below for details. Please do not hesitate to contact us with questions or concerns. Margot Jain MD    Cardiac Testing/ Procedures: A. Cardiac Cath/PCI:    B.ECHO/NIECY:  3/16/21 · LV: Calculated LVEF is 65%. Biplane method used to measure ejection fraction. Normal cavity size, wall thickness, systolic function (ejection fraction normal) and diastolic function. Wall motion: normal.TV: Mild tricuspid valve regurgitation is present. PA: Pulmonary arterial systolic pressure is 28 mmHg. AO: Mild ascending aorta dilatation. Ascending aorta diameter = 3.8 cm. Mildly increased LVOT velocity - 1.6 m/sec; no obstruction     C. StressNuclear/Stress ECHO/Stress test: Stress test - nml 3/2019     D. Vascular:     E. EP: Event monitor : 3/321- 4/2/21 No sig arrhythmia; symptoms asso with SR  ( Dr Everardo Mares) - 3/2019 - Nml     F.  Miscellaneous:  Office notes of Dr. Mario Seals seen by him April 11, 2019    Treadmill stress test 3/21/19-12 minutes. Normal.    E cardio event monitor 3/21/2019 to 4/19/2019-minimum heart rate 42 bpm maximum heart rate 145 bpm.  No A. fib. Sinus rhythm, sinus tachycardia, PAC    Stress echocardiogram 3/26/2012-9 minutes 30 seconds. Normal  Holter monitor 3/26/2012-average heart rate was 66 bpm.  Minimum heart rate 41. Maximum heart rate 145 bpm.  Rare PACs. Rare PVCs. Subjective:  Rei Healy is a 54 y.o. female who returns for follow up . Patient states she hast had episodes of palpitations. Attributes to recent social stressors/anxiety. No swelling lower extremities. States that half a tablet of Florinef is working well for her to keep her blood pressure up. Complains of cramping sensation both lower extremities left better than right. ( Initial visit - 6/27/19 48 yr old with hx of intermittent dizziness for the past few months. Recently because of the dizziness she had a fall. No loss of consciousness. Has had a cardiac evaluation in March which was negative. Recently was started on Florinef because of orthostatic hypotension by PCP. She has taken 2 doses.)    ROS:  (bold if positive, if negative)             Medications before admission:    Current Outpatient Medications   Medication Sig Dispense    calcium-cholecalciferol, d3, (CALCIUM 600 + D) 600-125 mg-unit tab Take  by mouth.  omega 3-DHA-EPA-fish oil 1,000 mg (120 mg-180 mg) capsule Take 1 Capsule by mouth daily.  ASHWAGANDHA ROOT EXTRACT PO Take  by mouth.  aspirin delayed-release 81 mg tablet Take 81 mg by mouth daily.  Diclofenac Potassium (Cambia) 50 mg pwpk 1 packet at onset and repeat in 2 hours x 1 prn, max 2 in 24 hours.  9 Packet    onabotulinumtoxinA (Botox) 200 unit injection Inject 155 units intramuscular to 31 FDA approved sites face and neck every 12 weeks  Indications: migraine prevention 200 Units    fludrocortisone (FLORINEF) 0.1 mg tablet Take 0.05 mg by mouth daily.  citalopram (CELEXA) 20 mg tablet Take 1 Tab by mouth daily. 90 Tab    OTHER Herbal supplement for mood -macka     butalbital-acetaminophen-caffeine (FIORICET, ESGIC) -40 mg per tablet TAKE 1 TO 2 TABLETS BY MOUTH EVERY 8 HOURS AS NEEDED 40 Tab    valACYclovir (Valtrex) 500 mg tablet Take 1 Tab by mouth as needed (take one as needed). 30 Tab    onabotulinumtoxinA (Botox) 200 unit injection Inject 155 units IM into 31 FDA indicated and approved sites in the head, face, neck every 3 months for chronic migraine. 1 Vial    clonazePAM (KlonoPIN) 0.5 mg tablet Take 1 Tab by mouth nightly as needed (anxiety). Max Daily Amount: 0.5 mg. Indications: panic disorder 20 Tab    fluticasone propionate (FLONASE ALLERGY RELIEF) 50 mcg/actuation nasal spray 2 Sprays by Both Nostrils route daily.  Biotin 2,500 mcg cap Take 2,500 mcg by mouth. No current facility-administered medications for this visit. Family History of CAD:    No    Social History:  Current  Smoker No    Physical Exam:  Visit Vitals  /78 (BP 1 Location: Left upper arm, BP Patient Position: Sitting)   Pulse 76   Ht 5' 7\" (1.702 m)   LMP 08/19/2010   SpO2 98%   BMI 21.14 kg/m²          Gen: Well-developed, well-nourished, in no acute distress  Neck: Supple,No JVD, No Carotid Bruit,   Resp: No accessory muscle use, Clear breath sounds, No rales or rhonchi  Card: Regular Rate,Rythm,Normal S1, S2, No murmurs, rubs or gallop. No thrills.    Abd:  Soft, non-tender, non-distended,BS+,   MSK: No cyanosis  Skin: No rashes    Neuro: moving all four extremities , follows commands appropriately  Psych:  Good insight, oriented to person, place , alert, Nml Affect  LE: No edema; spider veins present    EKG:       LABS:        Lab Results   Component Value Date/Time    WBC 4.4 06/03/2021 04:04 AM    HGB 12.0 06/03/2021 04:04 AM    HCT 36.8 06/03/2021 04:04 AM    PLATELET 929 49/90/5070 04:04 AM     Lab Results   Component Value Date/Time    Sodium 140 06/03/2021 04:04 AM    Potassium 3.3 (L) 06/03/2021 04:04 AM    Chloride 107 06/03/2021 04:04 AM    CO2 27 06/03/2021 04:04 AM    Anion gap 6 06/03/2021 04:04 AM    Glucose 92 06/03/2021 04:04 AM    BUN 13 06/03/2021 04:04 AM    Creatinine 0.49 (L) 06/03/2021 04:04 AM    BUN/Creatinine ratio 27 (H) 06/03/2021 04:04 AM    GFR est AA >60 06/03/2021 04:04 AM    GFR est non-AA >60 06/03/2021 04:04 AM    Calcium 7.9 (L) 06/03/2021 04:04 AM       Lab Results   Component Value Date/Time    aPTT 23.9 12/07/2019 08:17 PM     Lab Results   Component Value Date/Time    INR 1.0 12/07/2019 08:17 PM    INR 1.0 06/27/2015 12:13 AM    Prothrombin time 10.3 12/07/2019 08:17 PM    Prothrombin time 9.5 06/27/2015 12:13 AM     No components found for: Kaela Yeh MD

## 2021-09-20 NOTE — LETTER
9/20/2021    Patient: Myrna Flores   YOB: 1965   Date of Visit: 9/20/2021     Ean Zazueta MD  [facility]  [address] [facility]    Dear Ean Zazueta MD,      Thank you for referring Ms. Ham Dave to [facility] for evaluation. My notes for this consultation are attached. If you have questions, please do not hesitate to call me. I look forward to following your patient along with you.       Sincerely,    Miki Power MD

## 2021-10-08 ENCOUNTER — ANCILLARY PROCEDURE (OUTPATIENT)
Dept: CARDIOLOGY CLINIC | Age: 56
End: 2021-10-08
Payer: COMMERCIAL

## 2021-10-08 VITALS — BODY MASS INDEX: 21.19 KG/M2 | HEIGHT: 67 IN | WEIGHT: 135 LBS

## 2021-10-08 DIAGNOSIS — R20.0 NUMBNESS AND TINGLING OF BOTH LEGS: ICD-10-CM

## 2021-10-08 DIAGNOSIS — R20.2 NUMBNESS AND TINGLING OF BOTH LEGS: ICD-10-CM

## 2021-10-08 DIAGNOSIS — R25.2 LEG CRAMPING: ICD-10-CM

## 2021-10-08 PROCEDURE — 93922 UPR/L XTREMITY ART 2 LEVELS: CPT | Performed by: INTERNAL MEDICINE

## 2021-10-13 LAB
IMMEDIATE ARM BP: 140 MMHG
IMMEDIATE LEFT ABI: 1.29
IMMEDIATE LEFT TIBIAL: 181 MMHG
IMMEDIATE RIGHT ABI: 1.32
IMMEDIATE RIGHT TIBIAL: 185 MMHG
LEFT ABI: 1.2
LEFT ARM BP: 138 MMHG
LEFT POSTERIOR TIBIAL: 150 MMHG
RIGHT ABI: 1.22
RIGHT ARM BP: 131 MMHG
RIGHT POSTERIOR TIBIAL: 169 MMHG
VAS LEFT DORSALIS PEDIS BP: 166 MMHG
VAS RIGHT DORSALIS PEDIS BP: 159 MMHG

## 2021-10-15 ENCOUNTER — TELEPHONE (OUTPATIENT)
Dept: CARDIOLOGY CLINIC | Age: 56
End: 2021-10-15

## 2021-10-15 NOTE — TELEPHONE ENCOUNTER
Verified patient with two patient identifiers. Called patient and informed her regarding ANG result,per  normal ANG. Patient verbalized understanding.

## 2021-10-25 ENCOUNTER — TELEPHONE (OUTPATIENT)
Dept: NEUROLOGY | Age: 56
End: 2021-10-25

## 2021-11-01 ENCOUNTER — TELEPHONE (OUTPATIENT)
Dept: NEUROLOGY | Age: 56
End: 2021-11-01

## 2021-11-16 ENCOUNTER — TELEPHONE (OUTPATIENT)
Dept: NEUROLOGY | Age: 56
End: 2021-11-16

## 2021-11-16 ENCOUNTER — OFFICE VISIT (OUTPATIENT)
Dept: NEUROLOGY | Age: 56
End: 2021-11-16
Payer: COMMERCIAL

## 2021-11-16 DIAGNOSIS — G43.919 INTRACTABLE MIGRAINE WITHOUT STATUS MIGRAINOSUS, UNSPECIFIED MIGRAINE TYPE: Primary | ICD-10-CM

## 2021-11-16 PROCEDURE — 64615 CHEMODENERV MUSC MIGRAINE: CPT | Performed by: NURSE PRACTITIONER

## 2021-11-16 NOTE — PROGRESS NOTES
[facility]  OFFICE PROCEDURE PROGRESS NOTE        Chart reviewed for the following:   Domitila DREW NP, have reviewed the History, Physical and updated the Allergic reactions for Jeana BOX [facility] performed immediately prior to start of procedure:   Domitila DREW NP, have performed the [facility] start of the procedure:            * Patient was identified by name and date of birth   * Agreement on procedure being performed was verified  * Risks and Benefits explained to the patient  * Procedure site verified and marked as necessary  * Patient was positioned for comfort  * Consent was signed and verified     Time: 1100      Date of procedure: 11/16/2021    Procedure performed by:  Hamida Hickey NP    Provider assisted by: None    Patient assisted by: None    How tolerated by patient: tolerated the procedure well with no complications    Post Procedural Pain Scale: 2 - Hurts Little Bit    Comments: None      Botox Injection Note       Indication: patient has chronic recurrent migraine, has 7-10 less migraine days per month with botox injections    Procedure:   Botox concentration: 200 units in 4 ml of preservative-free normal saline. 31 sites injections, distribution as follow      Units/site  Sites Sides Subtotal    Procerus 5 1 1 5    5 1 2 10   Frontalis 5 2 2 20   Temporalis 5 4 2 40   Occipitalis 5 3 2 30   Upper cervical paraspinalis 5 2 2 20   Trapezius 5 3 2 30         200 units Botox were reconstituted, 155 units injected as above and the remainder was unavoidably wasted.      Patient tolerated procedure well.     _____________________________   Julie Krabbe

## 2021-12-15 ENCOUNTER — NURSE TRIAGE (OUTPATIENT)
Dept: OTHER | Facility: CLINIC | Age: 56
End: 2021-12-15

## 2021-12-15 NOTE — TELEPHONE ENCOUNTER
Received call from Kiersten at [facility], caller not on line. Complaint: Abd pain    Market: Steven Gaspar Name: [address] [place] [address] telephone number verified as [phone]    Unsuccessful attempt to re-connect with caller via phone, left message for return call to office    Reason for Disposition   Message left on unidentified voice mail. Phone number verified.      Left 2 messages    Protocols used: NO CONTACT OR DUPLICATE CONTACT CALL-ADULT-

## 2022-01-22 NOTE — PROGRESS NOTES
HPI  Ms. Madhuri Rodas is a 46y.o. year old female, she is seen today complaining of possible sinus infection. Patient reports worsening yellow nasal drainage, mild cough, and subjective fever for 1 week. Symptoms are worsening and she has associated facial pain and HA. She is taking mucinex for cough.       Denies sob/cp, nvd, ear pain, st.    Chief Complaint   Patient presents with    Sinus Infection     Patient Active Problem List   Diagnosis Code    Panic disorder without agoraphobia F41.0    Unspecified hypothyroidism E03.9    GERD (gastroesophageal reflux disease) K21.9    Injury of elbow, left S59.902A    Swelling of joint, elbow, left M25.422    Anxiety F41.9    TIA (transient ischemic attack) G45.9    Paresthesia of left arm R20.2    Chronic neck pain M54.2, G89.29    Burning sensation of feet R20.8    Screening for thyroid disorder Z13.29     Past Medical History:   Diagnosis Date    Anxiety     Arthritis     Chronic neck pain 6/27/2015    Fatigue     GERD (gastroesophageal reflux disease) 3/12/2014    Headache     Hiatal hernia     PUD (peptic ulcer disease)     TIA (transient ischemic attack) 6/27/2015    Unspecified hypothyroidism 12/19/2012     Social History     Social History    Marital status:      Spouse name: N/A    Number of children: N/A    Years of education: N/A     Social History Main Topics    Smoking status: Never Smoker    Smokeless tobacco: Never Used    Alcohol use 1.8 - 3.0 oz/week     3 - 5 Glasses of wine per week      Comment: occ    Drug use: No    Sexual activity: Yes     Partners: Male     Birth control/ protection: Surgical     Other Topics Concern     Service No    Blood Transfusions No    Caffeine Concern No    Occupational Exposure No    Hobby Hazards No    Sleep Concern Yes    Stress Concern Yes    Weight Concern Yes    Special Diet No    Back Care No    Exercise Yes    Bike Helmet No    Seat Belt Yes     Social History Narrative     Past Surgical History:   Procedure Laterality Date    HX GYN      BTL    HX HEENT      HX WISDOM TEETH EXTRACTION       Family History   Problem Relation Age of Onset    Alcohol abuse Neg Hx     Arthritis-rheumatoid Neg Hx     Asthma Neg Hx     Bleeding Prob Neg Hx     Diabetes Neg Hx     Elevated Lipids Neg Hx     Headache Neg Hx     Lung Disease Neg Hx     Migraines Neg Hx     Psychiatric Disorder Neg Hx     Stroke Neg Hx     Mental Retardation Neg Hx     Cancer Father     Hypertension Mother     Heart Disease Paternal Grandfather        Allergies   Allergen Reactions    Naproxen Other (comments)     Abdominal Pain       REVIEW OF SYSTEMS  Per hpi    MEDICATIONS  Current Outpatient Prescriptions   Medication Sig    GINKGO BILOBA (GINKOBA PO) Take  by mouth.  amoxicillin-clavulanate (AUGMENTIN) 875-125 mg per tablet Take 1 Tab by mouth two (2) times a day for 10 days.  EVENING PRIMROSE OIL PO Take  by mouth daily.  LYSINE PO Take  by mouth.  Flaxseed Oil oil by Does Not Apply route.  aspirin 81 mg chewable tablet Take 1 Tab by mouth daily.  multivitamin (ONE A DAY) tablet Take 1 Tab by mouth daily.  Dexlansoprazole (DEXILANT) 60 mg CpDB Take  by mouth.  valACYclovir (VALTREX) 500 mg tablet Take  by mouth as needed. No current facility-administered medications for this visit. PHYSICAL EXAM  Visit Vitals    /87 (BP 1 Location: Right arm, BP Patient Position: Sitting)    Pulse 66    Temp 98.7 °F (37.1 °C) (Oral)    Resp 18    Ht 5' 7\" (1.702 m)    Wt 139 lb 11.2 oz (63.4 kg)    SpO2 98%    BMI 21.88 kg/m2     Pupils, conjunctiva, nasal and oral mucosa are normal. EOM full. There is no facial weakness. Tongue is midline. TM wnl bilaterally. Posterior pharynx: no lesions, exudates, or erythema. Neck is supple with right anterior cervical lympadenopathy. No carotid bruits. Lungs are clear to auscultation bilaterally.  No None Done rales, rhonchi or wheezes. Heart examination reveals a normal S1 and S2 with no murmur or gallop. RRR. Gait and station are normal. There is no focal motor weakness. The patient is alert and oriented, with normal mental status and is able to give a good history. Mood is normal.  Skin is normal to inspection and palpation. Lab Results   Component Value Date/Time    Sodium 142 09/24/2015 01:00 PM    Potassium 3.9 09/24/2015 01:00 PM    Chloride 106 09/24/2015 01:00 PM    CO2 27 09/24/2015 01:00 PM    Anion gap 9 09/24/2015 01:00 PM    Glucose 97 09/24/2015 01:00 PM    BUN 13 09/24/2015 01:00 PM    Creatinine 0.50 09/24/2015 01:00 PM    BUN/Creatinine ratio 26 09/24/2015 01:00 PM    GFR est AA >60 09/24/2015 01:00 PM    GFR est non-AA >60 09/24/2015 01:00 PM    Calcium 8.7 09/24/2015 01:00 PM    Bilirubin, total 0.2 09/24/2015 01:00 PM    AST (SGOT) 12 09/24/2015 01:00 PM    Alk. phosphatase 71 09/24/2015 01:00 PM    Protein, total 6.7 09/24/2015 01:00 PM    Albumin 3.6 09/24/2015 01:00 PM    Globulin 3.1 09/24/2015 01:00 PM    A-G Ratio 1.2 09/24/2015 01:00 PM    ALT (SGPT) 13 09/24/2015 01:00 PM     Lab Results   Component Value Date/Time    WBC 5.9 09/24/2015 01:00 PM    Hemoglobin (POC) 12.9 03/17/2013 10:02 AM    HGB 12.9 09/24/2015 01:00 PM    Hematocrit (POC) 38 03/17/2013 10:02 AM    HCT 38.6 09/24/2015 01:00 PM    PLATELET 872 01/64/0909 01:00 PM    MCV 92.3 09/24/2015 01:00 PM     Lab Results   Component Value Date/Time    Cholesterol, total 170 06/27/2015 05:00 AM    HDL Cholesterol 80 06/27/2015 05:00 AM    LDL, calculated 80.2 06/27/2015 05:00 AM    VLDL, calculated 9.8 06/27/2015 05:00 AM    Triglyceride 49 06/27/2015 05:00 AM    CHOL/HDL Ratio 2.1 06/27/2015 05:00 AM     Lab Results   Component Value Date/Time    Hemoglobin A1c 5.7 10/31/2016 09:48 AM         ASSESSMENT/PLAN  Trevon Au was seen today for sinus infection.     Diagnoses and all orders for this visit:    Acute non-recurrent sinusitis, unspecified location    Other orders  -     amoxicillin-clavulanate (AUGMENTIN) 875-125 mg per tablet; Take 1 Tab by mouth two (2) times a day for 10 days. PLAN  1. Sinusitis - advised zyrtec, flonase, saline nasal spray and continued mucinex for cough. Will treat symptoms given length of illness and worsening symptoms. Health Maintenance Due   Topic Date Due    Hepatitis C Screening  1965    PAP AKA CERVICAL CYTOLOGY  07/29/2015    BREAST CANCER SCRN MAMMOGRAM  10/22/2015    FOBT Q 1 YEAR AGE 50-75  10/22/2015       Follow-up Disposition:  Return if symptoms worsen or fail to improve. Reviewed visit summary with the patient including pertinent labs, prescriptions, treatment, and diagnosis. The patient agrees and verbalized understanding and agreement with the plan. The nurse provided the patient and/or family with advanced directive information if needed and encouraged the patient to provide a copy to the office when available.

## 2022-01-27 RX ORDER — ONABOTULINUMTOXINA 200 [USP'U]/1
INJECTION, POWDER, LYOPHILIZED, FOR SOLUTION INTRADERMAL; INTRAMUSCULAR
Qty: 200 UNITS | Refills: 5 | Status: SHIPPED | OUTPATIENT
Start: 2022-01-27 | End: 2022-02-27 | Stop reason: ALTCHOICE

## 2022-02-01 ENCOUNTER — TELEPHONE (OUTPATIENT)
Dept: NEUROLOGY | Age: 57
End: 2022-02-01

## 2022-02-15 DIAGNOSIS — F41.9 ANXIETY: ICD-10-CM

## 2022-02-15 RX ORDER — CITALOPRAM 20 MG/1
20 TABLET, FILM COATED ORAL DAILY
Qty: 90 TABLET | Refills: 1 | Status: SHIPPED | OUTPATIENT
Start: 2022-02-15

## 2022-02-21 ENCOUNTER — OFFICE VISIT (OUTPATIENT)
Dept: FAMILY MEDICINE CLINIC | Age: 57
End: 2022-02-21
Payer: COMMERCIAL

## 2022-02-21 VITALS
OXYGEN SATURATION: 98 % | TEMPERATURE: 98.4 F | SYSTOLIC BLOOD PRESSURE: 140 MMHG | WEIGHT: 137.2 LBS | BODY MASS INDEX: 21.53 KG/M2 | HEIGHT: 67 IN | DIASTOLIC BLOOD PRESSURE: 90 MMHG | HEART RATE: 76 BPM | RESPIRATION RATE: 20 BRPM

## 2022-02-21 DIAGNOSIS — Z11.59 ENCOUNTER FOR HEPATITIS C SCREENING TEST FOR LOW RISK PATIENT: ICD-10-CM

## 2022-02-21 DIAGNOSIS — R82.998 DARK URINE: ICD-10-CM

## 2022-02-21 DIAGNOSIS — F41.9 ANXIETY: ICD-10-CM

## 2022-02-21 DIAGNOSIS — F41.0 PANIC DISORDER WITHOUT AGORAPHOBIA: ICD-10-CM

## 2022-02-21 DIAGNOSIS — Z12.31 ENCOUNTER FOR SCREENING MAMMOGRAM FOR MALIGNANT NEOPLASM OF BREAST: ICD-10-CM

## 2022-02-21 DIAGNOSIS — R30.0 DYSURIA: Primary | ICD-10-CM

## 2022-02-21 LAB
BILIRUB UR QL STRIP: NEGATIVE
GLUCOSE UR-MCNC: NEGATIVE MG/DL
KETONES P FAST UR STRIP-MCNC: NEGATIVE MG/DL
PH UR STRIP: 6 [PH] (ref 4.6–8)
PROT UR QL STRIP: NEGATIVE
SP GR UR STRIP: 1.01 (ref 1–1.03)
UA UROBILINOGEN AMB POC: NORMAL (ref 0.2–1)
URINALYSIS CLARITY POC: CLEAR
URINALYSIS COLOR POC: YELLOW
URINE BLOOD POC: NORMAL
URINE LEUKOCYTES POC: NORMAL
URINE NITRITES POC: NEGATIVE

## 2022-02-21 PROCEDURE — 81002 URINALYSIS NONAUTO W/O SCOPE: CPT | Performed by: INTERNAL MEDICINE

## 2022-02-21 PROCEDURE — 99214 OFFICE O/P EST MOD 30 MIN: CPT | Performed by: INTERNAL MEDICINE

## 2022-02-21 RX ORDER — CLONAZEPAM 0.5 MG/1
0.5 TABLET ORAL
Qty: 20 TABLET | Refills: 0 | Status: SHIPPED | OUTPATIENT
Start: 2022-02-21

## 2022-02-21 RX ORDER — CEPHALEXIN 500 MG/1
500 CAPSULE ORAL 2 TIMES DAILY
Qty: 10 CAPSULE | Refills: 0 | Status: SHIPPED | OUTPATIENT
Start: 2022-02-21 | End: 2022-02-26

## 2022-02-21 NOTE — PATIENT INSTRUCTIONS
Painful Urination (Dysuria): Care Instructions  Your Care Instructions  Burning pain with urination (dysuria) is a common symptom of a urinary tract infection or other urinary problems. The bladder may become inflamed. This can cause pain when the bladder fills and empties. You may also feel pain if the tube that carries urine from the bladder to the outside of the body (urethra) gets irritated or infected. Sexually transmitted infections (STIs) also may cause pain when you urinate. Sometimes the pain can be caused by things other than an infection. The urethra can be irritated by soaps, perfumes, or foreign objects in the urethra. Kidney stones can cause pain when they pass through the urethra. The cause may be hard to find. You may need tests. Treatment for painful urination depends on the cause. Follow-up care is a key part of your treatment and safety. Be sure to make and go to all appointments, and call your doctor if you are having problems. It's also a good idea to know your test results and keep a list of the medicines you take. How can you care for yourself at home? · Drink extra water for the next day or two. This will help make the urine less concentrated. (If you have kidney, heart, or liver disease and have to limit fluids, talk with your doctor before you increase the amount of fluids you drink.)  · Avoid drinks that are carbonated or have caffeine. They can irritate the bladder. · Urinate often. Try to empty your bladder each time. For women:  · Urinate right after you have sex. · After going to the bathroom, wipe from front to back. · Avoid douches, bubble baths, and feminine hygiene sprays. And avoid other feminine hygiene products that have deodorants. When should you call for help? Call your doctor now or seek immediate medical care if:    · You have new symptoms, such as fever, nausea, or vomiting.     · You have new or worse symptoms of a urinary problem. For example:  ?  You have blood or pus in your urine. ? You have chills or body aches. ? It hurts worse to urinate. ? You have groin or belly pain. ? You have pain in your back just below your rib cage (the flank area). Watch closely for changes in your health, and be sure to contact your doctor if you have any problems. Where can you learn more? Go to http://www.gray.com/  Enter H814 in the search box to learn more about \"Painful Urination (Dysuria): Care Instructions. \"  Current as of: February 10, 2021               Content Version: 13.0  © 0773-5937 [employer]. Care instructions adapted under license by [employer] (which disclaims liability or warranty for this information). If you have questions about a medical condition or this instruction, always ask your healthcare professional. [address] any warranty or liability for your use of this information.

## 2022-02-21 NOTE — PROGRESS NOTES
Chief Complaint   Patient presents with    Other     dark urine several weeks    Abdominal Pain     3 most recent PHQ Screens 2/21/2022   Little interest or pleasure in doing things Not at all   Feeling down, depressed, irritable, or hopeless Not at all   Total Score PHQ 2 0     Abuse Screening Questionnaire 2/21/2022   Do you ever feel afraid of your partner? N   Are you in a relationship with someone who physically or mentally threatens you? N   Is it safe for you to go home? Y     Visit Vitals  BP (!) 140/90   Pulse 76   Temp 98.4 °F (36.9 °C) (Oral)   Resp 20   Ht 5' 7\" (1.702 m)   Wt 137 lb 3.2 oz (62.2 kg)   SpO2 98%   BMI 21.49 kg/m²     1. \"Have you been to the ER, urgent care clinic since your last visit? Hospitalized since your last visit? \" 06/21     2. \"Have you seen or consulted any other health care providers outside of the [address] [place] [address] since your last visit? \" no    3. For patients aged 39-70: Has the patient had a colonoscopy / FIT/ Cologuard? no    If the patient is female:    4.  For patients aged 41-77: Has the patient had a mammogram within the past 2 years?scheduled

## 2022-02-22 LAB
ALBUMIN SERPL-MCNC: 4.1 G/DL (ref 3.5–5)
ALBUMIN/GLOB SERPL: 1.5 {RATIO} (ref 1.1–2.2)
ALP SERPL-CCNC: 82 U/L (ref 45–117)
ALT SERPL-CCNC: 21 U/L (ref 12–78)
ANION GAP SERPL CALC-SCNC: 2 MMOL/L (ref 5–15)
APPEARANCE UR: CLEAR
AST SERPL-CCNC: 17 U/L (ref 15–37)
BACTERIA URNS QL MICRO: ABNORMAL /HPF
BASOPHILS # BLD: 0.1 K/UL (ref 0–0.1)
BASOPHILS NFR BLD: 1 % (ref 0–1)
BILIRUB SERPL-MCNC: 0.6 MG/DL (ref 0.2–1)
BILIRUB UR QL: NEGATIVE
BUN SERPL-MCNC: 8 MG/DL (ref 6–20)
BUN/CREAT SERPL: 15 (ref 12–20)
CALCIUM SERPL-MCNC: 9.5 MG/DL (ref 8.5–10.1)
CHLORIDE SERPL-SCNC: 109 MMOL/L (ref 97–108)
CO2 SERPL-SCNC: 27 MMOL/L (ref 21–32)
COLOR UR: ABNORMAL
COMMENT, HOLDF: NORMAL
CREAT SERPL-MCNC: 0.54 MG/DL (ref 0.55–1.02)
DIFFERENTIAL METHOD BLD: NORMAL
EOSINOPHIL # BLD: 0.1 K/UL (ref 0–0.4)
EOSINOPHIL NFR BLD: 2 % (ref 0–7)
EPITH CASTS URNS QL MICRO: ABNORMAL /LPF
ERYTHROCYTE [DISTWIDTH] IN BLOOD BY AUTOMATED COUNT: 13.2 % (ref 11.5–14.5)
GLOBULIN SER CALC-MCNC: 2.7 G/DL (ref 2–4)
GLUCOSE SERPL-MCNC: 94 MG/DL (ref 65–100)
GLUCOSE UR STRIP.AUTO-MCNC: NEGATIVE MG/DL
HCT VFR BLD AUTO: 39.6 % (ref 35–47)
HCV AB SERPL QL IA: NONREACTIVE
HGB BLD-MCNC: 13 G/DL (ref 11.5–16)
HGB UR QL STRIP: ABNORMAL
IMM GRANULOCYTES # BLD AUTO: 0 K/UL (ref 0–0.04)
IMM GRANULOCYTES NFR BLD AUTO: 0 % (ref 0–0.5)
KETONES UR QL STRIP.AUTO: NEGATIVE MG/DL
LEUKOCYTE ESTERASE UR QL STRIP.AUTO: ABNORMAL
LYMPHOCYTES # BLD: 1.6 K/UL (ref 0.8–3.5)
LYMPHOCYTES NFR BLD: 33 % (ref 12–49)
MCH RBC QN AUTO: 31.6 PG (ref 26–34)
MCHC RBC AUTO-ENTMCNC: 32.8 G/DL (ref 30–36.5)
MCV RBC AUTO: 96.4 FL (ref 80–99)
MONOCYTES # BLD: 0.4 K/UL (ref 0–1)
MONOCYTES NFR BLD: 9 % (ref 5–13)
NEUTS SEG # BLD: 2.6 K/UL (ref 1.8–8)
NEUTS SEG NFR BLD: 55 % (ref 32–75)
NITRITE UR QL STRIP.AUTO: NEGATIVE
NRBC # BLD: 0 K/UL (ref 0–0.01)
NRBC BLD-RTO: 0 PER 100 WBC
OTHER,OTHU: ABNORMAL
PH UR STRIP: 6.5 [PH] (ref 5–8)
PLATELET # BLD AUTO: 224 K/UL (ref 150–400)
PMV BLD AUTO: 10.7 FL (ref 8.9–12.9)
POTASSIUM SERPL-SCNC: 4 MMOL/L (ref 3.5–5.1)
PROT SERPL-MCNC: 6.8 G/DL (ref 6.4–8.2)
PROT UR STRIP-MCNC: NEGATIVE MG/DL
RBC # BLD AUTO: 4.11 M/UL (ref 3.8–5.2)
RBC #/AREA URNS HPF: ABNORMAL /HPF (ref 0–5)
SAMPLES BEING HELD,HOLD: NORMAL
SODIUM SERPL-SCNC: 138 MMOL/L (ref 136–145)
SP GR UR REFRACTOMETRY: 1 (ref 1–1.03)
UA: UC IF INDICATED,UAUC: ABNORMAL
UROBILINOGEN UR QL STRIP.AUTO: 0.2 EU/DL (ref 0.2–1)
WBC # BLD AUTO: 4.8 K/UL (ref 3.6–11)
WBC URNS QL MICRO: ABNORMAL /HPF (ref 0–4)

## 2022-02-23 LAB
BACTERIA SPEC CULT: NORMAL
SERVICE CMNT-IMP: NORMAL

## 2022-02-27 NOTE — PROGRESS NOTES
Chief Complaint   Patient presents with    Other     dark urine several weeks    Abdominal Pain       Assessment/ Plan:   Diagnoses and all orders for this visit:    1. Dysuria  -     URINALYSIS W/ REFLEX CULTURE; Future  -     cephALEXin (KEFLEX) 500 mg capsule; Take 1 Capsule by mouth two (2) times a day for 5 days. 2. Dark urine  -     URINALYSIS W/ REFLEX CULTURE; Future  -     METABOLIC PANEL, COMPREHENSIVE; Future  -     CBC WITH AUTOMATED DIFF; Future  -     AMB POC URINALYSIS DIP STICK MANUAL W/O MICRO  Results for orders placed or performed in visit on 02/21/22   AMB POC URINALYSIS DIP STICK MANUAL W/O MICRO   Result Value Ref Range    Color (UA POC) Yellow     Clarity (UA POC) Clear     Glucose (UA POC) Negative Negative    Bilirubin (UA POC) Negative Negative    Ketones (UA POC) Negative Negative    Specific gravity (UA POC) 1.010 1.001 - 1.035    Blood (UA POC) 1+ Negative    pH (UA POC) 6.0 4.6 - 8.0    Protein (UA POC) Negative Negative    Urobilinogen (UA POC) 0.2 mg/dL 0.2 - 1    Nitrites (UA POC) Negative Negative    Leukocyte esterase (UA POC) 1+ Negative     Will treat as outlined above. 3. Encounter for screening mammogram for malignant neoplasm of breast  -     BRE MAMMO BI SCREENING INCL CAD; Future    4. Encounter for hepatitis C screening test for low risk patient  -     HEPATITIS C AB; Future    5. Anxiety  -     clonazePAM (KlonoPIN) 0.5 mg tablet; Take 1 Tablet by mouth nightly as needed (anxiety). Max Daily Amount: 0.5 mg. Indications: panic disorder    6. Panic disorder without agoraphobia  -     clonazePAM (KlonoPIN) 0.5 mg tablet; Take 1 Tablet by mouth nightly as needed (anxiety). Max Daily Amount: 0.5 mg. Indications: panic disorder    I have discussed the diagnosis with the patient and the intended treatment plan as seen in the above orders. The patient has received an after-visit summary and questions were answered concerning future plans.  Asked to return should symptoms worsen or not improve with treatment. Any pending labs and studies will be relayed to patient when they become available. Pt verbalizes understanding of plan of care and denies further questions or concerns at this time. Follow-up and Dispositions    · Return if symptoms worsen or fail to improve. Subjective:   Samuel Myers is a 64 y.o. female who presents today with c/o dysuria and dark urine with abdominal discomfort. She reports that this has been ongoing for several weeks, but more persistent today. In addition, we reviewed her records and she needs to up date and complete her HM. US dip in the office outlined above. HISTORICAL  PMH, PSH, FHX, SOCHX, ALLERGIES and MES were reviewed and updated today. Review of Systems  Review of Systems   Constitutional: Negative. HENT: Negative. Eyes: Negative. Respiratory: Negative. Cardiovascular: Negative. Gastrointestinal: Positive for abdominal pain. Genitourinary: Positive for dysuria. Musculoskeletal: Negative. Skin: Negative. Neurological: Negative. Endo/Heme/Allergies: Negative. Psychiatric/Behavioral: Negative. All other systems reviewed and are negative. Objective:     Vitals:    02/21/22 1523 02/21/22 1527   BP: (!) 144/92 (!) 140/90   Pulse: 76    Resp: 20    Temp: 98.4 °F (36.9 °C)    TempSrc: Oral    SpO2: 98%    Weight: 137 lb 3.2 oz (62.2 kg)    Height: 5' 7\" (1.702 m)        Physical Exam  Vitals and nursing note reviewed. Constitutional:       Appearance: Normal appearance. HENT:      Head: Normocephalic and atraumatic. Mouth/Throat:      Mouth: Mucous membranes are moist.   Eyes:      Extraocular Movements: Extraocular movements intact. Conjunctiva/sclera: Conjunctivae normal.      Pupils: Pupils are equal, round, and reactive to light. Cardiovascular:      Rate and Rhythm: Normal rate and regular rhythm. Pulses: Normal pulses.       Heart sounds: Normal heart sounds. Pulmonary:      Effort: Pulmonary effort is normal.      Breath sounds: Normal breath sounds. Musculoskeletal:      Cervical back: Normal range of motion and neck supple. Neurological:      General: No focal deficit present. Mental Status: She is alert. Mental status is at baseline. She is disoriented. Cranial Nerves: No cranial nerve deficit. Sensory: No sensory deficit. Motor: No weakness. Coordination: Coordination normal.      Gait: Gait normal.      Deep Tendon Reflexes: Reflexes normal.   Psychiatric:         Mood and Affect: Mood normal.         Behavior: Behavior normal.         Thought Content: Thought content normal.         Judgment: Judgment normal.         Shantal Álvarez MD  [facility]   02/21/22 10:06 AM    Patient Instructions          Painful Urination (Dysuria): Care Instructions  Your Care Instructions  Burning pain with urination (dysuria) is a common symptom of a urinary tract infection or other urinary problems. The bladder may become inflamed. This can cause pain when the bladder fills and empties. You may also feel pain if the tube that carries urine from the bladder to the outside of the body (urethra) gets irritated or infected. Sexually transmitted infections (STIs) also may cause pain when you urinate. Sometimes the pain can be caused by things other than an infection. The urethra can be irritated by soaps, perfumes, or foreign objects in the urethra. Kidney stones can cause pain when they pass through the urethra. The cause may be hard to find. You may need tests. Treatment for painful urination depends on the cause. Follow-up care is a key part of your treatment and safety. Be sure to make and go to all appointments, and call your doctor if you are having problems. It's also a good idea to know your test results and keep a list of the medicines you take. How can you care for yourself at home?   · Drink extra water for the next day or two. This will help make the urine less concentrated. (If you have kidney, heart, or liver disease and have to limit fluids, talk with your doctor before you increase the amount of fluids you drink.)  · Avoid drinks that are carbonated or have caffeine. They can irritate the bladder. · Urinate often. Try to empty your bladder each time. For women:  · Urinate right after you have sex. · After going to the bathroom, wipe from front to back. · Avoid douches, bubble baths, and feminine hygiene sprays. And avoid other feminine hygiene products that have deodorants. When should you call for help? Call your doctor now or seek immediate medical care if:    · You have new symptoms, such as fever, nausea, or vomiting.     · You have new or worse symptoms of a urinary problem. For example:  ? You have blood or pus in your urine. ? You have chills or body aches. ? It hurts worse to urinate. ? You have groin or belly pain. ? You have pain in your back just below your rib cage (the flank area). Watch closely for changes in your health, and be sure to contact your doctor if you have any problems. Where can you learn more? Go to http://www.gray.com/  Enter H814 in the search box to learn more about \"Painful Urination (Dysuria): Care Instructions. \"  Current as of: February 10, 2021               Content Version: 13.0  © 5906-3873 [employer]. Care instructions adapted under license by [employer] (which disclaims liability or warranty for this information). If you have questions about a medical condition or this instruction, always ask your healthcare professional. [place] [address] any warranty or liability for your use of this information.

## 2022-03-04 ENCOUNTER — TRANSCRIBE ORDER (OUTPATIENT)
Dept: SCHEDULING | Age: 57
End: 2022-03-04

## 2022-03-18 PROBLEM — R42 DIZZINESS: Status: ACTIVE | Noted: 2021-06-02

## 2022-03-19 PROBLEM — N94.89 PELVIC CONGESTION SYNDROME: Status: ACTIVE | Noted: 2019-09-26

## 2022-03-19 PROBLEM — Z90.710 S/P HYSTERECTOMY: Status: ACTIVE | Noted: 2019-10-16

## 2022-03-19 PROBLEM — R07.9 CHEST PAIN: Status: ACTIVE | Noted: 2021-06-02

## 2022-03-19 PROBLEM — T81.328A VAGINAL CUFF DEHISCENCE: Status: ACTIVE | Noted: 2019-12-08

## 2022-03-19 PROBLEM — G43.109 COMPLICATED MIGRAINE: Status: ACTIVE | Noted: 2021-06-03

## 2022-03-19 PROBLEM — T81.31XA VAGINAL CUFF DEHISCENCE: Status: ACTIVE | Noted: 2019-12-08

## 2022-05-05 ENCOUNTER — PATIENT MESSAGE (OUTPATIENT)
Dept: NEUROLOGY | Age: 57
End: 2022-05-05

## 2022-05-16 ENCOUNTER — TELEPHONE (OUTPATIENT)
Dept: NEUROLOGY | Age: 57
End: 2022-05-16

## 2022-05-17 ENCOUNTER — OFFICE VISIT (OUTPATIENT)
Dept: NEUROLOGY | Age: 57
End: 2022-05-17
Payer: COMMERCIAL

## 2022-05-17 DIAGNOSIS — G43.919 INTRACTABLE MIGRAINE WITHOUT STATUS MIGRAINOSUS, UNSPECIFIED MIGRAINE TYPE: Primary | ICD-10-CM

## 2022-05-17 PROCEDURE — 64615 CHEMODENERV MUSC MIGRAINE: CPT | Performed by: NURSE PRACTITIONER

## 2022-05-17 NOTE — PROGRESS NOTES
[facility]  OFFICE PROCEDURE PROGRESS NOTE        Chart reviewed for the following:   I, Brita Severance M O'Laughlin, NP, have reviewed the History, Physical and updated the Allergic reactions for Jeana [address] [facility] performed immediately prior to start of procedure:   I, Brita Severance M O'Laughlin, NP, have performed the following reviews on Schuyler Luis prior to the start of the procedure:            * Patient was identified by name and date of birth   * Agreement on procedure being performed was verified  * Risks and Benefits explained to the patient  * Procedure site verified and marked as necessary  * Patient was positioned for comfort  * Consent was signed and verified     Time: 1500      Date of procedure: 5/17/2022    Procedure performed by:  Sofy Almeida NP    Provider assisted by: None    Patient assisted by: None    How tolerated by patient: tolerated the procedure well with no complications    Post Procedural Pain Scale: 2 - Hurts Little Bit    Comments: None      Botox Injection Note       Indication: patient has chronic recurrent migraine, has 7-10 less migraine days per month with botox injections    Procedure:   Botox concentration: 200 units in 4 ml of preservative-free normal saline. 31 sites injections, distribution as follow      Units/site  Sites Sides Subtotal    Procerus 5 1 1 5    5 1 2 10   Frontalis 5 2 2 20   Temporalis 5 4 2 40   Occipitalis 5 3 2 30   Upper cervical paraspinalis 5 2 2 20   Trapezius 5 3 2 30         200 units Botox were reconstituted, 155 units injected as above and the remainder was unavoidably wasted.      Patient tolerated procedure well.     _____________________________   Justin Castillo

## 2022-05-18 ENCOUNTER — TELEPHONE (OUTPATIENT)
Dept: NEUROLOGY | Age: 57
End: 2022-05-18

## 2022-06-07 ENCOUNTER — OFFICE VISIT (OUTPATIENT)
Dept: CARDIOLOGY CLINIC | Age: 57
End: 2022-06-07
Payer: COMMERCIAL

## 2022-06-07 VITALS
HEIGHT: 67 IN | SYSTOLIC BLOOD PRESSURE: 112 MMHG | BODY MASS INDEX: 21.03 KG/M2 | HEART RATE: 88 BPM | OXYGEN SATURATION: 98 % | WEIGHT: 134 LBS | DIASTOLIC BLOOD PRESSURE: 70 MMHG

## 2022-06-07 DIAGNOSIS — F41.9 ANXIETY: ICD-10-CM

## 2022-06-07 DIAGNOSIS — I95.1 ORTHOSTATIC HYPOTENSION: ICD-10-CM

## 2022-06-07 DIAGNOSIS — R25.2 LEG CRAMPING: ICD-10-CM

## 2022-06-07 DIAGNOSIS — R00.2 PALPITATIONS: Primary | ICD-10-CM

## 2022-06-07 PROCEDURE — 93000 ELECTROCARDIOGRAM COMPLETE: CPT | Performed by: INTERNAL MEDICINE

## 2022-06-07 PROCEDURE — 99214 OFFICE O/P EST MOD 30 MIN: CPT | Performed by: INTERNAL MEDICINE

## 2022-06-07 NOTE — PROGRESS NOTES
Emelina Cleary MD    [address] [facility], [address] [facility]    Office [phone],DLB [phone]      Charlotte Gardner is a 64 y.o. female is here for f/u visit. CC - as in     Dear Ms Flory Saunders had the pleasure of seeing Ms John Prasad in the office today.        Assessment:     Palpitations - pt called on 9/10/21. Attributes it to anxiety. Dizziness  Hx of Anxiety  ? Orthostatic Hypotension     Plan:        Continue florinef  ( Takes 0.1 mg 1/2 tab daily )  Compression stockings- had pain while wearing it -  Hx of cramping sensation both lower extremities left more than right. Will check ANG. Aggressive CV risk factor modification. F/u based on cardiac w/u 12  months    Patient understands the plan. All questions were answered to the patient's satisfaction. Medication Side Effects and Warnings were discussed with patient: yes  Patient Labs were reviewed and or requested:  yes  Patient Past Records were reviewed and or requested: yes    I appreciate the opportunity to be involved in [facility]. See note below for details. Please do not hesitate to contact us with questions or concerns. Emelina Cleary MD    Cardiac Testing/ Procedures: A. Cardiac Cath/PCI:    B.ECHO/NIECY:  3/16/21 · LV: Calculated LVEF is 65%. Biplane method used to measure ejection fraction. Normal cavity size, wall thickness, systolic function (ejection fraction normal) and diastolic function. Wall motion: normal.TV: Mild tricuspid valve regurgitation is present. PA: Pulmonary arterial systolic pressure is 28 mmHg. AO: Mild ascending aorta dilatation. Ascending aorta diameter = 3.8 cm. Mildly increased LVOT velocity - 1.6 m/sec; no obstruction     C. StressNuclear/Stress ECHO/Stress test: Stress test - nml 3/2019     D. Vascular: 10/8/21 - Resting ANG's are normal at 1.22 on the right and 1.20 on the left.         E. EP: Event monitor : 3/321- 4/2/21 No sig arrhythmia; symptoms asso with SR  (  Juana) - 3/2019 - Nml     F. Miscellaneous:  Office notes of Dr. Ruslan Tucker seen by him April 11, 2019    Treadmill stress test 3/21/19-12 minutes. Normal.    E cardio event monitor 3/21/2019 to 4/19/2019-minimum heart rate 42 bpm maximum heart rate 145 bpm.  No A. fib. Sinus rhythm, sinus tachycardia, PAC    Stress echocardiogram 3/26/2012-9 minutes 30 seconds. Normal  Holter monitor 3/26/2012-average heart rate was 66 bpm.  Minimum heart rate 41. Maximum heart rate 145 bpm.  Rare PACs. Rare PVCs. Subjective:  Phil Camejo is a 64 y.o. female who returns for follow up . Occ palpitations. No swelling lower extremities. States that half a tablet of Florinef is working well for her to keep her blood pressure up. Has been having hematuria and is being evaluated for it. Scheduled to get a CT scan. ( Initial visit - 6/27/19 48 yr old with hx of intermittent dizziness for the past few months. Recently because of the dizziness she had a fall. No loss of consciousness. Has had a cardiac evaluation in March which was negative. Recently was started on Florinef because of orthostatic hypotension by PCP. She has taken 2 doses.)    ROS:  (bold if positive, if negative)             Medications before admission:    Current Outpatient Medications   Medication Sig Dispense    TURMERIC PO Take  by mouth.  OTHER Bone up includes- vitamin C, D, Magnesium , coppper, Manganes Vitamin K2 Boron     clonazePAM (KlonoPIN) 0.5 mg tablet Take 1 Tablet by mouth nightly as needed (anxiety). Max Daily Amount: 0.5 mg. Indications: panic disorder 20 Tablet    citalopram (CELEXA) 20 mg tablet Take 1 Tablet by mouth daily. 90 Tablet    calcium-cholecalciferol, d3, (CALCIUM 600 + D) 600-125 mg-unit tab Take  by mouth.  ASHWAGANDHA ROOT EXTRACT PO Take  by mouth.  aspirin delayed-release 81 mg tablet Take 81 mg by mouth daily.      Diclofenac Potassium (Cambia) 50 mg pwpk 1 packet at onset and repeat in 2 hours x 1 prn, max 2 in 24 hours. 9 Packet    fludrocortisone (FLORINEF) 0.1 mg tablet Take 0.05 mg by mouth daily.  OTHER Herbal supplement for mood -macka     butalbital-acetaminophen-caffeine (FIORICET, ESGIC) -40 mg per tablet TAKE 1 TO 2 TABLETS BY MOUTH EVERY 8 HOURS AS NEEDED 40 Tab    valACYclovir (Valtrex) 500 mg tablet Take 1 Tab by mouth as needed (take one as needed). 30 Tab    onabotulinumtoxinA (Botox) 200 unit injection Inject 155 units IM into 31 FDA indicated and approved sites in the head, face, neck every 3 months for chronic migraine. 1 Vial    Biotin 2,500 mcg cap Take 2,500 mcg by mouth. No current facility-administered medications for this visit. Family History of CAD:    No    Social History:  Current  Smoker No    Physical Exam:  Visit Vitals  /70 (BP 1 Location: Left upper arm, BP Patient Position: Standing)   Pulse 88   Ht 5' 7\" (1.702 m)   Wt 134 lb (60.8 kg)   LMP 08/19/2010   SpO2 98%   BMI 20.99 kg/m²          Gen: Well-developed, well-nourished, in no acute distress  Neck: Supple,No JVD, No Carotid Bruit,   Resp: No accessory muscle use, Clear breath sounds, No rales or rhonchi  Card: Regular Rate,Rythm,Normal S1, S2, No murmurs, rubs or gallop. No thrills.    Abd:  Soft, BS+,   MSK: No cyanosis  Skin: No rashes    Neuro: moving all four extremities , follows commands appropriately  Psych:  Good insight, oriented to person, place , alert, Nml Affect  LE: No edema; spider veins present    EKG: Sinus rhythm, nonspecific ST-T changes, NM WP      LABS:        Lab Results   Component Value Date/Time    WBC 4.8 02/21/2022 04:08 PM    HGB 13.0 02/21/2022 04:08 PM    HCT 39.6 02/21/2022 04:08 PM    PLATELET 889 00/80/7688 04:08 PM     Lab Results   Component Value Date/Time    Sodium 138 02/21/2022 04:08 PM    Potassium 4.0 02/21/2022 04:08 PM    Chloride 109 (H) 02/21/2022 04:08 PM    CO2 27 02/21/2022 04:08 PM    Anion gap 2 (L) 02/21/2022 04:08 PM Glucose 94 02/21/2022 04:08 PM    BUN 8 02/21/2022 04:08 PM    Creatinine 0.54 (L) 02/21/2022 04:08 PM    BUN/Creatinine ratio 15 02/21/2022 04:08 PM    GFR est AA >60 02/21/2022 04:08 PM    GFR est non-AA >60 02/21/2022 04:08 PM    Calcium 9.5 02/21/2022 04:08 PM       Lab Results   Component Value Date/Time    aPTT 23.9 12/07/2019 08:17 PM     Lab Results   Component Value Date/Time    INR 1.0 12/07/2019 08:17 PM    INR 1.0 06/27/2015 12:13 AM    Prothrombin time 10.3 12/07/2019 08:17 PM    Prothrombin time 9.5 06/27/2015 12:13 AM     No components found for: Sean Lucas MD

## 2022-06-07 NOTE — PROGRESS NOTES
Cheng Matos is a 64 y.o. female    Chief Complaint   Patient presents with    Follow-up     6 month f/u     Palpitations       Chest pain No; some heart racing while in [place]     SOB No    Dizziness thinks it was due to altitude     Swelling on her left side    Refills No    Visit Vitals  /74 (BP 1 Location: Left upper arm, BP Patient Position: Supine)   Pulse 62   Ht 5' 7\" (1.702 m)   Wt 134 lb (60.8 kg)   LMP 08/19/2010   SpO2 94%   BMI 20.99 kg/m²     Vitals:    06/07/22 0921 06/07/22 0941   BP: 118/78 112/74   BP 1 Location: Left upper arm Left upper arm   BP Patient Position: Sitting Supine   Pulse: 60 62   Height: 5' 7\" (1.702 m)    Weight: 134 lb (60.8 kg)    SpO2: 97% 94%         1. Have you been to the ER, urgent care clinic since your last visit? Hospitalized since your last visit? No    2. Have you seen or consulted any other health care providers outside of the [address] [place] [address] since your last visit? Include any pap smears or colon screening.   CT on Thursday for kidney pain

## 2022-06-07 NOTE — LETTER
6/11/2022    Patient: Cande Carlson   YOB: 1965   Date of Visit: 6/7/2022     Patrizia Rainey, [facility] [address] [place] [address]    Dear Patrizia Rainey MD,      Thank you for referring Ms. Phil Degroot to [facility] for evaluation. My notes for this consultation are attached. If you have questions, please do not hesitate to call me. I look forward to following your patient along with you.       Sincerely,    Elle Botello MD

## 2022-07-19 RX ORDER — ONABOTULINUMTOXINA 200 [USP'U]/1
INJECTION, POWDER, LYOPHILIZED, FOR SOLUTION INTRADERMAL; INTRAMUSCULAR
Qty: 1 EACH | Refills: 5 | Status: SHIPPED | OUTPATIENT
Start: 2022-07-19 | End: 2022-07-19 | Stop reason: SDUPTHER

## 2022-07-19 RX ORDER — ONABOTULINUMTOXINA 200 [USP'U]/1
INJECTION, POWDER, LYOPHILIZED, FOR SOLUTION INTRADERMAL; INTRAMUSCULAR
Qty: 1 EACH | Refills: 5 | Status: SHIPPED | OUTPATIENT
Start: 2022-07-19

## 2022-07-19 NOTE — TELEPHONE ENCOUNTER
Re: botox    rcvd call from local [facility]. Savannah Kumar advised new script for botox is needed. Sent message to nurse.

## 2022-08-08 ENCOUNTER — PATIENT MESSAGE (OUTPATIENT)
Dept: NEUROLOGY | Age: 57
End: 2022-08-08

## 2022-08-11 NOTE — TELEPHONE ENCOUNTER
Spoke with JIMMY HAGEN [facility] and patient called them yesterday. Ready to ship today & should arrive tomorrow, 8/12/22.

## 2022-08-12 ENCOUNTER — TELEPHONE (OUTPATIENT)
Dept: NEUROLOGY | Age: 57
End: 2022-08-12

## 2022-08-12 NOTE — TELEPHONE ENCOUNTER
Re: botox [facility] [id number]    Follow up and see PA request in [employer] was approved from 05/16/22-05/15/23,auth# [id number]. Printed and scanned letter to chart.  Called local [facility] and delivery is scheduled for today 08/12/22

## 2022-08-16 ENCOUNTER — OFFICE VISIT (OUTPATIENT)
Dept: NEUROLOGY | Age: 57
End: 2022-08-16
Payer: COMMERCIAL

## 2022-08-16 ENCOUNTER — TELEPHONE (OUTPATIENT)
Dept: NEUROLOGY | Age: 57
End: 2022-08-16

## 2022-08-16 DIAGNOSIS — G43.109 CHRONIC MIGRAINE WITH AURA: Primary | ICD-10-CM

## 2022-08-16 PROCEDURE — 64615 CHEMODENERV MUSC MIGRAINE: CPT | Performed by: NURSE PRACTITIONER

## 2022-08-16 NOTE — PROGRESS NOTES
[facility]  OFFICE PROCEDURE PROGRESS NOTE        Chart reviewed for the following:   Lashell DREW NP, have reviewed the History, Physical and updated the Allergic reactions for Jeana BOX [facility] performed immediately prior to start of procedure:   Spencer DREW NP, have performed the following reviews on Ruthann Tony prior to the start of the procedure:            * Patient was identified by name and date of birth   * Agreement on procedure being performed was verified  * Risks and Benefits explained to the patient  * Procedure site verified and marked as necessary  * Patient was positioned for comfort  * Consent was signed and verified     Time: 1420      Date of procedure: 8/16/2022    Procedure performed by:  Penelope Ye NP    Provider assisted by: None    Patient assisted by: None    How tolerated by patient: tolerated the procedure well with no complications    Post Procedural Pain Scale: 2 - Hurts Little Bit    Comments: None      Botox Injection Note       Indication: patient has chronic recurrent migraine, has 7-10 less migraine days per month with botox injections    Procedure:   Botox concentration: 200 units in 4 ml of preservative-free normal saline. 31 sites injections, distribution as follow      Units/site  Sites Sides Subtotal    Procerus 5 1 1 5    5 1 2 10   Frontalis 5 2 2 20   Temporalis 5 4 2 40   Occipitalis 5 3 2 30   Upper cervical paraspinalis 5 2 2 20   Trapezius 5 3 2 30         200 units Botox were reconstituted, 155 units injected as above and the remainder was unavoidably wasted.      Patient tolerated procedure well.     _____________________________   Terence Page

## 2022-10-25 ENCOUNTER — TELEPHONE (OUTPATIENT)
Dept: NEUROLOGY | Age: 57
End: 2022-10-25

## 2022-10-25 NOTE — TELEPHONE ENCOUNTER
Called and spoke with Irvin ([place]) at [facility]. Botox scheduled to deliver on 11/1/2022.

## 2022-11-01 ENCOUNTER — TELEPHONE (OUTPATIENT)
Dept: NEUROLOGY | Age: 57
End: 2022-11-01

## 2022-11-01 NOTE — TELEPHONE ENCOUNTER
Botox 200 units delivered  #[id number]  4 refills  [facility]  [phone]

## 2022-11-02 ENCOUNTER — TELEPHONE (OUTPATIENT)
Dept: NEUROLOGY | Age: 57
End: 2022-11-02

## 2022-12-06 ENCOUNTER — OFFICE VISIT (OUTPATIENT)
Dept: NEUROLOGY | Age: 57
End: 2022-12-06
Payer: COMMERCIAL

## 2022-12-06 DIAGNOSIS — G43.109 CHRONIC MIGRAINE WITH AURA: Primary | ICD-10-CM

## 2022-12-06 PROCEDURE — 64615 CHEMODENERV MUSC MIGRAINE: CPT | Performed by: NURSE PRACTITIONER

## 2022-12-06 RX ORDER — DICLOFENAC POTASSIUM 50 MG/1
POWDER, FOR SOLUTION ORAL
Qty: 9 PACKET | Refills: 5 | Status: SHIPPED | OUTPATIENT
Start: 2022-12-06

## 2022-12-06 NOTE — PROGRESS NOTES
[facility]  OFFICE PROCEDURE PROGRESS NOTE        Chart reviewed for the following:   I, [de-identified] M TERRELL Phillips, have reviewed the History, Physical and updated the Allergic reactions for Jeana [facility] [facility] performed immediately prior to start of procedure:   I, [de-identified] M TERRELL Phillips, have performed the following reviews on Nasrin Mishra prior to the start of the procedure:            * Patient was identified by name and date of birth   * Agreement on procedure being performed was verified  * Risks and Benefits explained to the patient  * Procedure site verified and marked as necessary  * Patient was positioned for comfort  * Consent was signed and verified     Time: 1500      Date of procedure: 12/6/2022    Procedure performed by:  Valeri Caballero NP    Provider assisted by: None    Patient assisted by: None    How tolerated by patient: tolerated the procedure well with no complications    Post Procedural Pain Scale: 2 - Hurts Little Bit    Comments: None      Botox Injection Note       Indication: patient has chronic recurrent migraine, has 7-10 less migraine days per month with botox injections    Procedure:   Botox concentration: 200 units in 4 ml of preservative-free normal saline. 31 sites injections, distribution as follow      Units/site  Sites Sides Subtotal    Procerus 5 1 1 5    5 1 2 10   Frontalis 5 2 2 20   Temporalis 5 4 2 40   Occipitalis 5 3 2 30   Upper cervical paraspinalis 5 2 2 20   Trapezius 5 3 2 30         200 units Botox were reconstituted, 155 units injected as above and the remainder was unavoidably wasted.      Patient tolerated procedure well.     _____________________________   Chio Coleman

## 2022-12-16 ENCOUNTER — DOCUMENTATION ONLY (OUTPATIENT)
Dept: NEUROLOGY | Age: 57
End: 2022-12-16

## 2023-02-10 ENCOUNTER — OFFICE VISIT (OUTPATIENT)
Dept: CARDIOLOGY CLINIC | Age: 58
End: 2023-02-10
Payer: COMMERCIAL

## 2023-02-10 VITALS
HEART RATE: 62 BPM | OXYGEN SATURATION: 97 % | HEIGHT: 67 IN | BODY MASS INDEX: 22.54 KG/M2 | DIASTOLIC BLOOD PRESSURE: 80 MMHG | SYSTOLIC BLOOD PRESSURE: 118 MMHG | WEIGHT: 143.6 LBS

## 2023-02-10 DIAGNOSIS — R07.9 CHEST PAIN, UNSPECIFIED TYPE: Primary | ICD-10-CM

## 2023-02-10 DIAGNOSIS — F41.9 ANXIETY: ICD-10-CM

## 2023-02-10 DIAGNOSIS — R00.2 PALPITATIONS: ICD-10-CM

## 2023-02-10 DIAGNOSIS — I95.1 ORTHOSTATIC HYPOTENSION: ICD-10-CM

## 2023-02-10 NOTE — LETTER
2/13/2023    Patient: Val Roger   YOB: 1965   Date of Visit: 2/10/2023     Melani Fabian MD  [facility] [address]  [address] [facility]    Dear Melani Fabian MD,      Thank you for referring Ms. Evie Lawrence to [address] [place] [address] for evaluation. My notes for this consultation are attached. If you have questions, please do not hesitate to call me. I look forward to following your patient along with you.       Sincerely,    Evie Serrano MD

## 2023-02-10 NOTE — PROGRESS NOTES
Dorys Galvan MD    [id number] [facility] [place], [address] [facility]    Office [phone],ILW [phone]      Genoveva Garcia is a 62 y.o. female is here for f/u visit. CC - as in     Dear Ms Fili Nagy,     I had the pleasure of seeing Ms Castellon Inningrid in the office today. Assessment:     Chest tightness  Palpitations  Dizziness  Hx of Anxiety  ? Orthostatic Hypotension  Migraines     Plan:     Stress test  Echocardiogram  If recurrent palpitations-we will consider repeating event monitor/probably will need ILR since she has had event monitors previously  Continue florinef  ( Takes 0.1 mg 1/2 tab daily )  Aggressive CV risk factor modification. F/u based on cardiac w/u 3  months    Patient understands the plan. All questions were answered to the patient's satisfaction. Medication Side Effects and Warnings were discussed with patient: yes  Patient Labs were reviewed and or requested:  yes  Patient Past Records were reviewed and or requested: yes    I appreciate the opportunity to be involved in [facility]. See note below for details. Please do not hesitate to contact us with questions or concerns. Dorys Galvan MD    Cardiac Testing/ Procedures: A. Cardiac Cath/PCI:    B.ECHO/NIECY:  3/16/21 · LV: Calculated LVEF is 65%. Biplane method used to measure ejection fraction. Normal cavity size, wall thickness, systolic function (ejection fraction normal) and diastolic function. Wall motion: normal.TV: Mild tricuspid valve regurgitation is present. PA: Pulmonary arterial systolic pressure is 28 mmHg. AO: Mild ascending aorta dilatation. Ascending aorta diameter = 3.8 cm. Mildly increased LVOT velocity - 1.6 m/sec; no obstruction     C. StressNuclear/Stress ECHO/Stress test: Stress test - nml 3/2019     D. Vascular: 10/8/21 - Resting ANG's are normal at 1.22 on the right and 1.20 on the left.          E. EP: Event monitor : 3/321- 4/2/21 No sig arrhythmia; symptoms asso with SR  (  Lilina Holden) - 3/2019 - Nml     F. Miscellaneous:  Office notes of Dr. Erica Lovell seen by him April 11, 2019    Treadmill stress test 3/21/19-12 minutes. Normal.    E cardio event monitor 3/21/2019 to 4/19/2019-minimum heart rate 42 bpm maximum heart rate 145 bpm.  No A. fib. Sinus rhythm, sinus tachycardia, PAC    Stress echocardiogram 3/26/2012-9 minutes 30 seconds. Normal  Holter monitor 3/26/2012-average heart rate was 66 bpm.  Minimum heart rate 41. Maximum heart rate 145 bpm.  Rare PACs. Rare PVCs. Subjective:  Jadyn Sol is a 62 y.o. female who returns for follow up . Patient states that she has had intermittent chest tightness while working out over the past couple of days. She has not changed her workout. She also had an episode when she was woken up from sleep with palpitations. No dyspnea/diaphoresis/nausea/vomiting.        ( Initial visit - 6/27/19 48 yr old with hx of intermittent dizziness for the past few months. Recently because of the dizziness she had a fall. No loss of consciousness. Has had a cardiac evaluation in March which was negative. Recently was started on Florinef because of orthostatic hypotension by PCP. She has taken 2 doses.)    ROS:  (bold if positive, if negative)           Medications before admission:    Current Outpatient Medications   Medication Sig Dispense    ergocalciferol, vitamin D2, (VITAMIN D2 PO) Take  by mouth daily. Diclofenac Potassium (Cambia) 50 mg pwpk 1 packet at onset and repeat in 2 hours x 1 prn, max 2 in 24 hours. 9 Packet    onabotulinumtoxinA (Botox) 200 unit injection INJECT 155 UNITS IN THE MUSCLE TO 31 FDA APPROVED SITES, FACE, AND NECK EVERY 12 WEEKS FOR CHRONIC MIGRAINE PREVENTION 1 Each    clonazePAM (KlonoPIN) 0.5 mg tablet Take 1 Tablet by mouth nightly as needed (anxiety). Max Daily Amount: 0.5 mg. Indications: panic disorder 20 Tablet    citalopram (CELEXA) 20 mg tablet Take 1 Tablet by mouth daily.  (Patient taking differently: Take 10 mg by mouth daily.) 90 Tablet    calcium-cholecalciferol, d3, (CALCIUM 600 + D) 600-125 mg-unit tab Take  by mouth. ASHWAGANDHA ROOT EXTRACT PO Take  by mouth. aspirin delayed-release 81 mg tablet Take 81 mg by mouth daily. fludrocortisone (FLORINEF) 0.1 mg tablet Take 0.05 mg by mouth daily. OTHER Herbal supplement for mood -macka     butalbital-acetaminophen-caffeine (FIORICET, ESGIC) -40 mg per tablet TAKE 1 TO 2 TABLETS BY MOUTH EVERY 8 HOURS AS NEEDED 40 Tab    valACYclovir (Valtrex) 500 mg tablet Take 1 Tab by mouth as needed (take one as needed). 30 Tab    Biotin 2,500 mcg cap Take 2,500 mcg by mouth. TURMERIC PO Take  by mouth. (Patient not taking: Reported on 2/10/2023)     OTHER Bone up includes- vitamin C, D, Magnesium , coppper, Manganes Vitamin K2 Boron (Patient not taking: Reported on 2/10/2023)      No current facility-administered medications for this visit. Family History of CAD:    No    Social History:  Current  Smoker No    Physical Exam:  Visit Vitals  /80 (BP 1 Location: Left upper arm, BP Patient Position: Sitting)   Pulse 62   Ht 5' 7\" (1.702 m)   Wt 143 lb 9.6 oz (65.1 kg)   LMP 08/19/2010   SpO2 97%   BMI 22.49 kg/m²          Gen: Well-developed, well-nourished, in no acute distress  Neck: Supple,No JVD, No Carotid Bruit,   Resp: No accessory muscle use, Clear breath sounds, No rales or rhonchi  Card: Regular Rate,Rythm,Normal S1, S2, No murmurs, rubs or gallop. No thrills.    Abd:  Soft, BS+,   MSK: No cyanosis  Skin: No rashes    Neuro: moving all four extremities , follows commands appropriately  Psych:  Good insight, oriented to person, place , alert, Nml Affect  LE: No edema; spider veins present    EKG: Sinus rhythm, nonspecific ST-T changes, W[place] WP      LABS:        Lab Results   Component Value Date/Time    WBC 4.8 02/21/2022 04:08 PM    HGB 13.0 02/21/2022 04:08 PM    HCT 39.6 02/21/2022 04:08 PM    PLATELET 682 56/59/6071 04:08 PM     Lab Results   Component Value Date/Time    Sodium 138 02/21/2022 04:08 PM    Potassium 4.0 02/21/2022 04:08 PM    Chloride 109 (H) 02/21/2022 04:08 PM    CO2 27 02/21/2022 04:08 PM    Anion gap 2 (L) 02/21/2022 04:08 PM    Glucose 94 02/21/2022 04:08 PM    BUN 8 02/21/2022 04:08 PM    Creatinine 0.54 (L) 02/21/2022 04:08 PM    BUN/Creatinine ratio 15 02/21/2022 04:08 PM    GFR est AA >60 02/21/2022 04:08 PM    GFR est non-AA >60 02/21/2022 04:08 PM    Calcium 9.5 02/21/2022 04:08 PM       Lab Results   Component Value Date/Time    aPTT 23.9 12/07/2019 08:17 PM     Lab Results   Component Value Date/Time    INR 1.0 12/07/2019 08:17 PM    INR 1.0 06/27/2015 12:13 AM    Prothrombin time 10.3 12/07/2019 08:17 PM    Prothrombin time 9.5 06/27/2015 12:13 AM     No components found for: Oniel Cox MD

## 2023-02-10 NOTE — PROGRESS NOTES
Adebayo Lentz is a 62 y.o. female    Chief Complaint   Patient presents with    Chest Pain    Palpitations       Vitals:    02/10/23 1412   BP: 118/80   BP 1 Location: Left upper arm   BP Patient Position: Sitting   Pulse: 62   Height: 5' 7\" (1.702 m)   Weight: 143 lb 9.6 oz (65.1 kg)   SpO2: 97%       Chest pain patient states some CP     SOB no    Dizziness some dizziness     Swelling patient has notice swelling     Refills no      1. Have you been to the ER, urgent care clinic since your last visit? Hospitalized since your last visit? No    2. Have you seen or consulted any other health care providers outside of the [address] [place] [address] since your last visit? Include any pap smears or colon screening.  No

## 2023-03-10 ENCOUNTER — OFFICE VISIT (OUTPATIENT)
Dept: NEUROLOGY | Age: 58
End: 2023-03-10
Payer: COMMERCIAL

## 2023-03-10 DIAGNOSIS — G43.109 CHRONIC MIGRAINE WITH AURA: Primary | ICD-10-CM

## 2023-03-10 PROCEDURE — 64615 CHEMODENERV MUSC MIGRAINE: CPT | Performed by: NURSE PRACTITIONER

## 2023-03-10 RX ORDER — DICLOFENAC POTASSIUM 50 MG/1
POWDER, FOR SOLUTION ORAL
Qty: 9 PACKET | Refills: 5 | Status: SHIPPED | OUTPATIENT
Start: 2023-03-10

## 2023-03-13 ENCOUNTER — ANCILLARY PROCEDURE (OUTPATIENT)
Dept: CARDIOLOGY CLINIC | Age: 58
End: 2023-03-13
Payer: COMMERCIAL

## 2023-03-13 ENCOUNTER — ANCILLARY PROCEDURE (OUTPATIENT)
Dept: CARDIOLOGY CLINIC | Age: 58
End: 2023-03-13

## 2023-03-13 VITALS
HEIGHT: 67 IN | WEIGHT: 143 LBS | DIASTOLIC BLOOD PRESSURE: 80 MMHG | BODY MASS INDEX: 22.44 KG/M2 | SYSTOLIC BLOOD PRESSURE: 130 MMHG

## 2023-03-13 VITALS — HEIGHT: 67 IN | BODY MASS INDEX: 22.44 KG/M2 | WEIGHT: 143 LBS

## 2023-03-13 DIAGNOSIS — I95.1 ORTHOSTATIC HYPOTENSION: ICD-10-CM

## 2023-03-13 DIAGNOSIS — R00.2 PALPITATIONS: ICD-10-CM

## 2023-03-13 DIAGNOSIS — R07.9 CHEST PAIN, UNSPECIFIED TYPE: ICD-10-CM

## 2023-03-13 DIAGNOSIS — F41.9 ANXIETY: ICD-10-CM

## 2023-03-13 PROCEDURE — 93306 TTE W/DOPPLER COMPLETE: CPT | Performed by: INTERNAL MEDICINE

## 2023-03-13 PROCEDURE — 93015 CV STRESS TEST SUPVJ I&R: CPT | Performed by: INTERNAL MEDICINE

## 2023-03-13 NOTE — PROGRESS NOTES
[facility]  OFFICE PROCEDURE PROGRESS NOTE        Chart reviewed for the following:   I, [de-identified] M TERRELL Phillips, have reviewed the History, Physical and updated the Allergic reactions for Jeana  [facility] performed immediately prior to start of procedure:   I, [de-identified] M TERRELL Phillips, have performed the following reviews on [employer] prior to the start of the procedure:            * Patient was identified by name and date of birth   * Agreement on procedure being performed was verified  * Risks and Benefits explained to the patient  * Procedure site verified and marked as necessary  * Patient was positioned for comfort  * Consent was signed and verified     Time: 1500      Date of procedure: 3/13/2023    Procedure performed by:  Agustin Avelar NP    Provider assisted by: None    Patient assisted by: None    How tolerated by patient: tolerated the procedure well with no complications    Post Procedural Pain Scale: 2 - Hurts Little Bit    Comments: None      Botox Injection Note       Indication: patient has chronic recurrent migraine, has 7-10 less migraine days per month with botox injections    Procedure:   Botox concentration: 200 units in 4 ml of preservative-free normal saline. 31 sites injections, distribution as follow      Units/site  Sites Sides Subtotal    Procerus 5 1 1 5    5 1 2 10   Frontalis 5 2 2 20   Temporalis 5 4 2 40   Occipitalis 5 3 2 30   Upper cervical paraspinalis 5 2 2 20   Trapezius 5 3 2 30         200 units Botox were reconstituted, 155 units injected as above and the remainder was unavoidably wasted.      Patient tolerated procedure well.     _____________________________   Amadeo Puls

## 2023-03-15 LAB
ECHO AO ASC DIAM: 3.4 CM
ECHO AO ASCENDING AORTA INDEX: 1.94 CM/M2
ECHO AO ROOT DIAM: 2.9 CM
ECHO AO ROOT INDEX: 1.66 CM/M2
ECHO AV PEAK GRADIENT: 11 MMHG
ECHO AV PEAK VELOCITY: 1.7 M/S
ECHO AV VELOCITY RATIO: 0.88
ECHO EST RA PRESSURE: 3 MMHG
ECHO LA DIAMETER INDEX: 1.77 CM/M2
ECHO LA DIAMETER: 3.1 CM
ECHO LA TO AORTIC ROOT RATIO: 1.07
ECHO LA VOL 2C: 45 ML (ref 22–52)
ECHO LA VOL 4C: 58 ML (ref 22–52)
ECHO LA VOL BP: 52 ML (ref 22–52)
ECHO LA VOL/BSA BIPLANE: 30 ML/M2 (ref 16–34)
ECHO LA VOLUME AREA LENGTH: 55 ML
ECHO LA VOLUME INDEX A2C: 26 ML/M2 (ref 16–34)
ECHO LA VOLUME INDEX A4C: 33 ML/M2 (ref 16–34)
ECHO LA VOLUME INDEX AREA LENGTH: 31 ML/M2 (ref 16–34)
ECHO LV E' LATERAL VELOCITY: 14 CM/S
ECHO LV E' SEPTAL VELOCITY: 10 CM/S
ECHO LV EDV A2C: 81 ML
ECHO LV EDV A4C: 97 ML
ECHO LV EDV BP: 89 ML (ref 56–104)
ECHO LV EDV INDEX A4C: 55 ML/M2
ECHO LV EDV INDEX BP: 51 ML/M2
ECHO LV EDV NDEX A2C: 46 ML/M2
ECHO LV EJECTION FRACTION A2C: 74 %
ECHO LV EJECTION FRACTION A4C: 53 %
ECHO LV EJECTION FRACTION BIPLANE: 63 % (ref 55–100)
ECHO LV ESV A2C: 21 ML
ECHO LV ESV A4C: 46 ML
ECHO LV ESV BP: 33 ML (ref 19–49)
ECHO LV ESV INDEX A2C: 12 ML/M2
ECHO LV ESV INDEX A4C: 26 ML/M2
ECHO LV ESV INDEX BP: 19 ML/M2
ECHO LV FRACTIONAL SHORTENING: 33 % (ref 28–44)
ECHO LV INTERNAL DIMENSION DIASTOLE INDEX: 2.8 CM/M2
ECHO LV INTERNAL DIMENSION DIASTOLIC: 4.9 CM (ref 3.9–5.3)
ECHO LV INTERNAL DIMENSION SYSTOLIC INDEX: 1.89 CM/M2
ECHO LV INTERNAL DIMENSION SYSTOLIC: 3.3 CM
ECHO LV IVSD: 0.7 CM (ref 0.6–0.9)
ECHO LV MASS 2D: 120.8 G (ref 67–162)
ECHO LV MASS INDEX 2D: 69 G/M2 (ref 43–95)
ECHO LV POSTERIOR WALL DIASTOLIC: 0.8 CM (ref 0.6–0.9)
ECHO LV RELATIVE WALL THICKNESS RATIO: 0.33
ECHO LVOT PEAK GRADIENT: 9 MMHG
ECHO LVOT PEAK VELOCITY: 1.5 M/S
ECHO MV A VELOCITY: 0.83 M/S
ECHO MV AREA PHT: 3.7 CM2
ECHO MV E DECELERATION TIME (DT): 204.7 MS
ECHO MV E VELOCITY: 0.85 M/S
ECHO MV E/A RATIO: 1.02
ECHO MV E/E' LATERAL: 6.07
ECHO MV E/E' RATIO (AVERAGED): 7.29
ECHO MV E/E' SEPTAL: 8.5
ECHO MV PRESSURE HALF TIME (PHT): 59.4 MS
ECHO RA AREA 4C: 15.8 CM2
ECHO RA END SYSTOLIC VOLUME APICAL 4 CHAMBER INDEX BSA: 26 ML/M2
ECHO RA VOLUME AREA LENGTH APICAL 4 CHAMBER: 47 ML
ECHO RA VOLUME: 46 ML
ECHO RIGHT VENTRICULAR SYSTOLIC PRESSURE (RVSP): 24 MMHG
ECHO RV FREE WALL PEAK S': 14 CM/S
ECHO RV INTERNAL DIMENSION: 3.5 CM
ECHO RV TAPSE: 2.9 CM (ref 1.7–?)
ECHO TV REGURGITANT MAX VELOCITY: 2.28 M/S
ECHO TV REGURGITANT PEAK GRADIENT: 21 MMHG
STRESS ANGINA INDEX: 0
STRESS BASELINE DIAS BP: 80 MMHG
STRESS BASELINE HR: 82 BPM
STRESS BASELINE SYS BP: 130 MMHG
STRESS ESTIMATED WORKLOAD: 10.1 METS
STRESS EXERCISE DUR MIN: 9 MIN
STRESS EXERCISE DUR SEC: 0 SEC
STRESS O2 SAT PEAK: 98 %
STRESS O2 SAT REST: 100 %
STRESS PEAK DIAS BP: 88 MMHG
STRESS PEAK SYS BP: 160 MMHG
STRESS PERCENT HR ACHIEVED: 94 %
STRESS POST PEAK HR: 153 BPM
STRESS RATE PRESSURE PRODUCT: NORMAL BPM*MMHG
STRESS TARGET HR: 163 BPM

## 2023-03-15 NOTE — PROGRESS NOTES
Called patient to notify of test findings. Not available. Left VM to return call to office. Last mychart use from 2021.

## 2023-06-02 ENCOUNTER — TELEPHONE (OUTPATIENT)
Age: 58
End: 2023-06-02

## 2023-06-07 ENCOUNTER — TELEPHONE (OUTPATIENT)
Age: 58
End: 2023-06-07

## 2023-06-23 ENCOUNTER — PROCEDURE VISIT (OUTPATIENT)
Age: 58
End: 2023-06-23

## 2023-06-23 DIAGNOSIS — G43.709 CHRONIC MIGRAINE WITHOUT AURA, NOT INTRACTABLE, WITHOUT STATUS MIGRAINOSUS: Primary | ICD-10-CM

## 2023-06-23 NOTE — PROGRESS NOTES
OFFICE PROCEDURE PROGRESS NOTE        Chart reviewed for the following:   Carson JIANG APRN - NP, have reviewed the History, Physical and updated the Allergic reactions for Rosalind BECERRA [address] [facility] performed immediately prior to start of procedure:   Baljit JIANG APRN - NP, have performed the following reviews on [employer] prior to the start of the procedure:            * Patient was identified by name and date of birth   * Agreement on procedure being performed was verified  * Risks and Benefits explained to the patient  * Procedure site verified and marked as necessary  * Patient was positioned for comfort  * Consent was signed and verified     Time: 1200      Date of procedure: 6/23/2023    Procedure performed by:  JULIO Dumont NP    Provider assisted by: None    Patient assisted by: None    How tolerated by patient: tolerated the procedure well with no complications    Post Procedural Pain Scale: 2 - Hurts Little Bit    Comments: None    LOT: [id number]  EXP: 02/2026          Botox Injection Note       Indication: patient has chronic recurrent migraine, has 7-10 less migraine days per month with botox injections    Procedure:   Botox concentration: 200 units in 4 ml of preservative-free normal saline. 31 sites injections, distribution as follow      Units/site  Sites Sides Subtotal    Procerus 5 1 1 5    5 1 2 10   Frontalis 5 2 2 20   Temporalis 5 4 2 40   Occipitalis 5 3 2 30   Upper cervical paraspinalis 5 2 2 20   Trapezius 5 3 2 30         200 units Botox were reconstituted, 155 units injected as above and the remainder was unavoidably wasted.      Patient tolerated procedure well.     _____________________________   Guerda Jett

## 2023-07-01 ENCOUNTER — HOSPITAL ENCOUNTER (OUTPATIENT)
Facility: HOSPITAL | Age: 58
Setting detail: OBSERVATION
Discharge: HOME | End: 2023-07-01
Attending: EMERGENCY MEDICINE | Admitting: INTERNAL MEDICINE
Payer: COMMERCIAL

## 2023-07-01 ENCOUNTER — APPOINTMENT (OUTPATIENT)
Facility: HOSPITAL | Age: 58
End: 2023-07-01
Payer: COMMERCIAL

## 2023-07-01 VITALS
WEIGHT: 135 LBS | BODY MASS INDEX: 21.14 KG/M2 | OXYGEN SATURATION: 97 % | HEART RATE: 71 BPM | RESPIRATION RATE: 17 BRPM | DIASTOLIC BLOOD PRESSURE: 96 MMHG | TEMPERATURE: 98.4 F | SYSTOLIC BLOOD PRESSURE: 140 MMHG

## 2023-07-01 DIAGNOSIS — R09.89 SUSPECTED CEREBROVASCULAR ACCIDENT (CVA): Primary | ICD-10-CM

## 2023-07-01 DIAGNOSIS — R20.2 NUMBNESS AND TINGLING IN LEFT ARM: ICD-10-CM

## 2023-07-01 DIAGNOSIS — R42 DIZZINESS: ICD-10-CM

## 2023-07-01 DIAGNOSIS — R20.0 NUMBNESS AND TINGLING IN LEFT ARM: ICD-10-CM

## 2023-07-01 DIAGNOSIS — G45.9 TIA (TRANSIENT ISCHEMIC ATTACK): ICD-10-CM

## 2023-07-01 PROBLEM — I16.0 HYPERTENSIVE URGENCY: Status: ACTIVE | Noted: 2023-07-01

## 2023-07-01 LAB
ALBUMIN SERPL-MCNC: 3.6 G/DL (ref 3.5–5)
ALBUMIN/GLOB SERPL: 1.1 (ref 1.1–2.2)
ALP SERPL-CCNC: 80 U/L (ref 45–117)
ALT SERPL-CCNC: 19 U/L (ref 12–78)
ANION GAP SERPL CALC-SCNC: 4 MMOL/L (ref 5–15)
AST SERPL-CCNC: 13 U/L (ref 15–37)
BASOPHILS # BLD: 0.1 K/UL (ref 0–0.1)
BASOPHILS NFR BLD: 1 % (ref 0–1)
BILIRUB SERPL-MCNC: 0.4 MG/DL (ref 0.2–1)
BUN SERPL-MCNC: 11 MG/DL (ref 6–20)
BUN/CREAT SERPL: 15 (ref 12–20)
CALCIUM SERPL-MCNC: 8.8 MG/DL (ref 8.5–10.1)
CHLORIDE SERPL-SCNC: 113 MMOL/L (ref 97–108)
CO2 SERPL-SCNC: 26 MMOL/L (ref 21–32)
COMMENT:: NORMAL
CREAT SERPL-MCNC: 0.72 MG/DL (ref 0.55–1.02)
DIFFERENTIAL METHOD BLD: NORMAL
EOSINOPHIL # BLD: 0.2 K/UL (ref 0–0.4)
EOSINOPHIL NFR BLD: 3 % (ref 0–7)
ERYTHROCYTE [DISTWIDTH] IN BLOOD BY AUTOMATED COUNT: 12.7 % (ref 11.5–14.5)
GLOBULIN SER CALC-MCNC: 3.2 G/DL (ref 2–4)
GLUCOSE SERPL-MCNC: 115 MG/DL (ref 65–100)
HCT VFR BLD AUTO: 39.6 % (ref 35–47)
HGB BLD-MCNC: 13 G/DL (ref 11.5–16)
IMM GRANULOCYTES # BLD AUTO: 0 K/UL (ref 0–0.04)
IMM GRANULOCYTES NFR BLD AUTO: 0 % (ref 0–0.5)
INR PPP: 1 (ref 0.9–1.1)
LYMPHOCYTES # BLD: 1.4 K/UL (ref 0.8–3.5)
LYMPHOCYTES NFR BLD: 25 % (ref 12–49)
MCH RBC QN AUTO: 30.3 PG (ref 26–34)
MCHC RBC AUTO-ENTMCNC: 32.8 G/DL (ref 30–36.5)
MCV RBC AUTO: 92.3 FL (ref 80–99)
MONOCYTES # BLD: 0.5 K/UL (ref 0–1)
MONOCYTES NFR BLD: 9 % (ref 5–13)
NEUTS SEG # BLD: 3.4 K/UL (ref 1.8–8)
NEUTS SEG NFR BLD: 62 % (ref 32–75)
NRBC # BLD: 0 K/UL (ref 0–0.01)
NRBC BLD-RTO: 0 PER 100 WBC
PLATELET # BLD AUTO: 211 K/UL (ref 150–400)
PMV BLD AUTO: 9.8 FL (ref 8.9–12.9)
POTASSIUM SERPL-SCNC: 3.7 MMOL/L (ref 3.5–5.1)
PROT SERPL-MCNC: 6.8 G/DL (ref 6.4–8.2)
PROTHROMBIN TIME: 10.6 SEC (ref 9–11.1)
RBC # BLD AUTO: 4.29 M/UL (ref 3.8–5.2)
SODIUM SERPL-SCNC: 143 MMOL/L (ref 136–145)
SPECIMEN HOLD: NORMAL
TROPONIN I SERPL HS-MCNC: 7 NG/L (ref 0–51)
WBC # BLD AUTO: 5.6 K/UL (ref 3.6–11)

## 2023-07-01 PROCEDURE — 6370000000 HC RX 637 (ALT 250 FOR IP): Performed by: INTERNAL MEDICINE

## 2023-07-01 PROCEDURE — 85025 COMPLETE CBC W/AUTO DIFF WBC: CPT

## 2023-07-01 PROCEDURE — 99223 1ST HOSP IP/OBS HIGH 75: CPT | Performed by: PSYCHIATRY & NEUROLOGY

## 2023-07-01 PROCEDURE — 85610 PROTHROMBIN TIME: CPT

## 2023-07-01 PROCEDURE — 70547 MR ANGIOGRAPHY NECK W/O DYE: CPT

## 2023-07-01 PROCEDURE — 70498 CT ANGIOGRAPHY NECK: CPT

## 2023-07-01 PROCEDURE — G0378 HOSPITAL OBSERVATION PER HR: HCPCS

## 2023-07-01 PROCEDURE — 84484 ASSAY OF TROPONIN QUANT: CPT

## 2023-07-01 PROCEDURE — 36415 COLL VENOUS BLD VENIPUNCTURE: CPT

## 2023-07-01 PROCEDURE — 70544 MR ANGIOGRAPHY HEAD W/O DYE: CPT

## 2023-07-01 PROCEDURE — 6360000004 HC RX CONTRAST MEDICATION: Performed by: RADIOLOGY

## 2023-07-01 PROCEDURE — 93005 ELECTROCARDIOGRAM TRACING: CPT | Performed by: EMERGENCY MEDICINE

## 2023-07-01 PROCEDURE — 80053 COMPREHEN METABOLIC PANEL: CPT

## 2023-07-01 PROCEDURE — A9579 GAD-BASE MR CONTRAST NOS,1ML: HCPCS | Performed by: RADIOLOGY

## 2023-07-01 PROCEDURE — 0042T CT BRAIN PERFUSION: CPT

## 2023-07-01 PROCEDURE — 70553 MRI BRAIN STEM W/O & W/DYE: CPT

## 2023-07-01 PROCEDURE — 99285 EMERGENCY DEPT VISIT HI MDM: CPT

## 2023-07-01 PROCEDURE — 70450 CT HEAD/BRAIN W/O DYE: CPT

## 2023-07-01 PROCEDURE — 94761 N-INVAS EAR/PLS OXIMETRY MLT: CPT

## 2023-07-01 RX ORDER — ASPIRIN 81 MG/1
81 TABLET ORAL DAILY
Status: DISCONTINUED | OUTPATIENT
Start: 2023-07-01 | End: 2023-07-01 | Stop reason: HOSPADM

## 2023-07-01 RX ORDER — ROSUVASTATIN CALCIUM 10 MG/1
40 TABLET, COATED ORAL NIGHTLY
Status: DISCONTINUED | OUTPATIENT
Start: 2023-07-01 | End: 2023-07-01 | Stop reason: HOSPADM

## 2023-07-01 RX ORDER — ASPIRIN 300 MG/1
300 SUPPOSITORY RECTAL DAILY
Status: DISCONTINUED | OUTPATIENT
Start: 2023-07-01 | End: 2023-07-01 | Stop reason: HOSPADM

## 2023-07-01 RX ORDER — ONDANSETRON 2 MG/ML
4 INJECTION INTRAMUSCULAR; INTRAVENOUS EVERY 6 HOURS PRN
Status: DISCONTINUED | OUTPATIENT
Start: 2023-07-01 | End: 2023-07-01 | Stop reason: HOSPADM

## 2023-07-01 RX ORDER — ONDANSETRON 4 MG/1
4 TABLET, ORALLY DISINTEGRATING ORAL EVERY 8 HOURS PRN
Status: DISCONTINUED | OUTPATIENT
Start: 2023-07-01 | End: 2023-07-01 | Stop reason: HOSPADM

## 2023-07-01 RX ORDER — ENOXAPARIN SODIUM 100 MG/ML
40 INJECTION SUBCUTANEOUS DAILY
Status: DISCONTINUED | OUTPATIENT
Start: 2023-07-01 | End: 2023-07-01 | Stop reason: HOSPADM

## 2023-07-01 RX ORDER — POLYETHYLENE GLYCOL 3350 17 G/17G
17 POWDER, FOR SOLUTION ORAL DAILY PRN
Status: DISCONTINUED | OUTPATIENT
Start: 2023-07-01 | End: 2023-07-01 | Stop reason: HOSPADM

## 2023-07-01 RX ADMIN — GADOTERIDOL 12 ML: 279.3 INJECTION, SOLUTION INTRAVENOUS at 09:22

## 2023-07-01 RX ADMIN — ASPIRIN 81 MG: 81 TABLET, COATED ORAL at 11:01

## 2023-07-01 RX ADMIN — IOPAMIDOL 100 ML: 755 INJECTION, SOLUTION INTRAVENOUS at 05:59

## 2023-07-01 RX ADMIN — IOPAMIDOL 40 ML: 755 INJECTION, SOLUTION INTRAVENOUS at 06:00

## 2023-07-01 ASSESSMENT — PAIN - FUNCTIONAL ASSESSMENT
PAIN_FUNCTIONAL_ASSESSMENT: NONE - DENIES PAIN

## 2023-07-02 LAB
EKG ATRIAL RATE: 60 BPM
EKG DIAGNOSIS: NORMAL
EKG P AXIS: 2 DEGREES
EKG P-R INTERVAL: 134 MS
EKG Q-T INTERVAL: 416 MS
EKG QRS DURATION: 86 MS
EKG QTC CALCULATION (BAZETT): 416 MS
EKG R AXIS: 46 DEGREES
EKG T AXIS: 29 DEGREES
EKG VENTRICULAR RATE: 60 BPM

## 2023-07-02 PROCEDURE — 93010 ELECTROCARDIOGRAM REPORT: CPT | Performed by: INTERNAL MEDICINE

## 2023-07-03 ENCOUNTER — TELEPHONE (OUTPATIENT)
Age: 58
End: 2023-07-03

## 2023-07-03 NOTE — TELEPHONE ENCOUNTER
Care Transitions Initial Follow Up Call    Outreach made within 2 business days of discharge: Yes    Patient: Ed Verdugo Patient : 1965   MRN: [id number]  Reason for Admission: There are no discharge diagnoses documented for the most recent discharge. Discharge Date: 23       Spoke with: patient    Discharge department/facility: [facility] Interactive Patient Contact:  Was patient able to fill all prescriptions: Yes  Was patient instructed to bring all medications to the follow-up visit: Yes  Is patient taking all medications as directed in the discharge summary?  Yes  Does patient understand their discharge instructions: Yes  Does patient have questions or concerns that need addressed prior to 7-14 day follow up office visit: NO    Scheduled appointment with PCP within 7-14 days    Follow Up  Future Appointments   Date Time Provider [address] [place] [address]   7/10/2023  4:15 PM Jocelyn Glover MD PMA BS AMB   2023  3:20 PM Ledy Jules MD CAVSF BS AMB   2023 10:40 AM JULIO Castle - JEROME NEUROWTCRSPB BS [facility] MA

## 2023-07-03 NOTE — TELEPHONE ENCOUNTER
Care Transitions Initial Follow Up Call    Outreach made within 2 business days of discharge: Yes    Patient: Gay Gao Patient : 1965   MRN: [id number]  Reason for Admission: There are no discharge diagnoses documented for the most recent discharge.   Discharge Date: 23       Spoke with: left VM for patient    Discharge department/facility: [facility] Interactive Patient Contact:  Was patient able to fill all prescriptions:     Scheduled appointment with PCP within 7-14 days    Follow Up  Future Appointments   Date Time Provider [address] [place] [address]   7/10/2023  4:15 PM Sujey Rodarte MD PMA BS AMB   2023  3:20 PM Tish Steiner MD CAVSF BS AMB   2023 10:40 AM JULIO Hankins - JEROME NEUROWTCRSPB BS Dian Carlisle MA

## 2023-07-10 ENCOUNTER — OFFICE VISIT (OUTPATIENT)
Age: 58
End: 2023-07-10
Payer: COMMERCIAL

## 2023-07-10 VITALS
OXYGEN SATURATION: 97 % | BODY MASS INDEX: 21.82 KG/M2 | HEART RATE: 89 BPM | DIASTOLIC BLOOD PRESSURE: 84 MMHG | RESPIRATION RATE: 16 BRPM | HEIGHT: 67 IN | SYSTOLIC BLOOD PRESSURE: 118 MMHG | TEMPERATURE: 97.9 F | WEIGHT: 139 LBS

## 2023-07-10 DIAGNOSIS — K44.9 HIATAL HERNIA: ICD-10-CM

## 2023-07-10 DIAGNOSIS — M79.672 LEFT FOOT PAIN: ICD-10-CM

## 2023-07-10 DIAGNOSIS — R30.0 DYSURIA: ICD-10-CM

## 2023-07-10 DIAGNOSIS — Z12.31 SCREENING MAMMOGRAM FOR BREAST CANCER: ICD-10-CM

## 2023-07-10 DIAGNOSIS — Z12.11 SCREENING FOR COLON CANCER: ICD-10-CM

## 2023-07-10 DIAGNOSIS — R10.11 RUQ ABDOMINAL PAIN: Primary | ICD-10-CM

## 2023-07-10 DIAGNOSIS — R35.0 URINARY FREQUENCY: ICD-10-CM

## 2023-07-10 PROCEDURE — 99213 OFFICE O/P EST LOW 20 MIN: CPT | Performed by: INTERNAL MEDICINE

## 2023-07-10 PROCEDURE — 3079F DIAST BP 80-89 MM HG: CPT | Performed by: INTERNAL MEDICINE

## 2023-07-10 PROCEDURE — 3074F SYST BP LT 130 MM HG: CPT | Performed by: INTERNAL MEDICINE

## 2023-07-10 RX ORDER — ESTRADIOL 10 UG/1
INSERT VAGINAL
COMMUNITY
Start: 2023-04-05

## 2023-07-10 RX ORDER — DICLOFENAC SODIUM 75 MG/1
TABLET, DELAYED RELEASE ORAL
COMMUNITY
Start: 2023-07-09

## 2023-07-10 SDOH — ECONOMIC STABILITY: FOOD INSECURITY: WITHIN THE PAST 12 MONTHS, THE FOOD YOU BOUGHT JUST DIDN'T LAST AND YOU DIDN'T HAVE MONEY TO GET MORE.: NEVER TRUE

## 2023-07-10 SDOH — ECONOMIC STABILITY: FOOD INSECURITY: WITHIN THE PAST 12 MONTHS, YOU WORRIED THAT YOUR FOOD WOULD RUN OUT BEFORE YOU GOT MONEY TO BUY MORE.: NEVER TRUE

## 2023-07-10 SDOH — ECONOMIC STABILITY: INCOME INSECURITY: HOW HARD IS IT FOR YOU TO PAY FOR THE VERY BASICS LIKE FOOD, HOUSING, MEDICAL CARE, AND HEATING?: NOT HARD AT ALL

## 2023-07-10 SDOH — ECONOMIC STABILITY: HOUSING INSECURITY
IN THE LAST 12 MONTHS, WAS THERE A TIME WHEN YOU DID NOT HAVE A STEADY PLACE TO SLEEP OR SLEPT IN A SHELTER (INCLUDING NOW)?: NO

## 2023-07-10 ASSESSMENT — PATIENT HEALTH QUESTIONNAIRE - PHQ9
SUM OF ALL RESPONSES TO PHQ9 QUESTIONS 1 & 2: 0
SUM OF ALL RESPONSES TO PHQ QUESTIONS 1-9: 0
1. LITTLE INTEREST OR PLEASURE IN DOING THINGS: 0
2. FEELING DOWN, DEPRESSED OR HOPELESS: 0

## 2023-07-10 ASSESSMENT — ENCOUNTER SYMPTOMS
ABDOMINAL PAIN: 1
ALLERGIC/IMMUNOLOGIC NEGATIVE: 1
ABDOMINAL DISTENTION: 1
RESPIRATORY NEGATIVE: 1
EYES NEGATIVE: 1

## 2023-07-13 ENCOUNTER — NURSE ONLY (OUTPATIENT)
Age: 58
End: 2023-07-13

## 2023-07-13 DIAGNOSIS — K44.9 HIATAL HERNIA: ICD-10-CM

## 2023-07-13 DIAGNOSIS — R10.11 RUQ ABDOMINAL PAIN: ICD-10-CM

## 2023-07-13 DIAGNOSIS — R30.0 DYSURIA: ICD-10-CM

## 2023-07-13 DIAGNOSIS — R35.0 URINARY FREQUENCY: ICD-10-CM

## 2023-07-14 ENCOUNTER — TELEPHONE (OUTPATIENT)
Age: 58
End: 2023-07-14

## 2023-07-14 ENCOUNTER — HOSPITAL ENCOUNTER (OUTPATIENT)
Facility: HOSPITAL | Age: 58
End: 2023-07-14
Attending: INTERNAL MEDICINE
Payer: COMMERCIAL

## 2023-07-14 ENCOUNTER — HOSPITAL ENCOUNTER (OUTPATIENT)
Facility: HOSPITAL | Age: 58
Discharge: HOME | End: 2023-07-14
Attending: INTERNAL MEDICINE
Payer: COMMERCIAL

## 2023-07-14 DIAGNOSIS — R10.11 RUQ ABDOMINAL PAIN: ICD-10-CM

## 2023-07-14 DIAGNOSIS — M79.672 LEFT FOOT PAIN: ICD-10-CM

## 2023-07-14 DIAGNOSIS — K44.9 HIATAL HERNIA: ICD-10-CM

## 2023-07-14 DIAGNOSIS — Z12.31 SCREENING MAMMOGRAM FOR BREAST CANCER: ICD-10-CM

## 2023-07-14 LAB
ALBUMIN SERPL-MCNC: 3.8 G/DL (ref 3.5–5)
ALBUMIN/GLOB SERPL: 1.5 (ref 1.1–2.2)
ALP SERPL-CCNC: 87 U/L (ref 45–117)
ALT SERPL-CCNC: 17 U/L (ref 12–78)
ANION GAP SERPL CALC-SCNC: 8 MMOL/L (ref 5–15)
APPEARANCE UR: ABNORMAL
AST SERPL-CCNC: 16 U/L (ref 15–37)
BACTERIA URNS QL MICRO: NEGATIVE /HPF
BASOPHILS # BLD: 0.1 K/UL (ref 0–0.1)
BASOPHILS NFR BLD: 2 % (ref 0–1)
BILIRUB SERPL-MCNC: 0.6 MG/DL (ref 0.2–1)
BILIRUB UR QL: NEGATIVE
BUN SERPL-MCNC: 7 MG/DL (ref 6–20)
BUN/CREAT SERPL: 12 (ref 12–20)
CALCIUM SERPL-MCNC: 9.2 MG/DL (ref 8.5–10.1)
CHLORIDE SERPL-SCNC: 106 MMOL/L (ref 97–108)
CO2 SERPL-SCNC: 27 MMOL/L (ref 21–32)
COLOR UR: ABNORMAL
CREAT SERPL-MCNC: 0.57 MG/DL (ref 0.55–1.02)
DIFFERENTIAL METHOD BLD: ABNORMAL
EOSINOPHIL # BLD: 0.3 K/UL (ref 0–0.4)
EOSINOPHIL NFR BLD: 6 % (ref 0–7)
EPITH CASTS URNS QL MICRO: ABNORMAL /LPF
ERYTHROCYTE [DISTWIDTH] IN BLOOD BY AUTOMATED COUNT: 12.9 % (ref 11.5–14.5)
ERYTHROCYTE [SEDIMENTATION RATE] IN BLOOD: 5 MM/HR (ref 0–30)
GLOBULIN SER CALC-MCNC: 2.6 G/DL (ref 2–4)
GLUCOSE SERPL-MCNC: 97 MG/DL (ref 65–100)
GLUCOSE UR STRIP.AUTO-MCNC: NEGATIVE MG/DL
HCT VFR BLD AUTO: 42.8 % (ref 35–47)
HGB BLD-MCNC: 13.5 G/DL (ref 11.5–16)
HGB UR QL STRIP: NEGATIVE
HYALINE CASTS URNS QL MICRO: ABNORMAL /LPF (ref 0–5)
IMM GRANULOCYTES # BLD AUTO: 0 K/UL (ref 0–0.04)
IMM GRANULOCYTES NFR BLD AUTO: 0 % (ref 0–0.5)
KETONES UR QL STRIP.AUTO: NEGATIVE MG/DL
LEUKOCYTE ESTERASE UR QL STRIP.AUTO: ABNORMAL
LIPASE SERPL-CCNC: 104 U/L (ref 73–393)
LYMPHOCYTES # BLD: 1.7 K/UL (ref 0.8–3.5)
LYMPHOCYTES NFR BLD: 36 % (ref 12–49)
MCH RBC QN AUTO: 30.2 PG (ref 26–34)
MCHC RBC AUTO-ENTMCNC: 31.5 G/DL (ref 30–36.5)
MCV RBC AUTO: 95.7 FL (ref 80–99)
MONOCYTES # BLD: 0.5 K/UL (ref 0–1)
MONOCYTES NFR BLD: 10 % (ref 5–13)
NEUTS SEG # BLD: 2.2 K/UL (ref 1.8–8)
NEUTS SEG NFR BLD: 46 % (ref 32–75)
NITRITE UR QL STRIP.AUTO: NEGATIVE
NRBC # BLD: 0 K/UL (ref 0–0.01)
NRBC BLD-RTO: 0 PER 100 WBC
PH UR STRIP: 7 (ref 5–8)
PLATELET # BLD AUTO: 300 K/UL (ref 150–400)
PMV BLD AUTO: 10.3 FL (ref 8.9–12.9)
POTASSIUM SERPL-SCNC: 4.1 MMOL/L (ref 3.5–5.1)
PROT SERPL-MCNC: 6.4 G/DL (ref 6.4–8.2)
PROT UR STRIP-MCNC: NEGATIVE MG/DL
RBC # BLD AUTO: 4.47 M/UL (ref 3.8–5.2)
RBC #/AREA URNS HPF: ABNORMAL /HPF (ref 0–5)
SODIUM SERPL-SCNC: 141 MMOL/L (ref 136–145)
SP GR UR REFRACTOMETRY: 1.01 (ref 1–1.03)
URINE CULTURE IF INDICATED: ABNORMAL
UROBILINOGEN UR QL STRIP.AUTO: 0.2 EU/DL (ref 0.2–1)
WBC # BLD AUTO: 4.6 K/UL (ref 3.6–11)
WBC URNS QL MICRO: ABNORMAL /HPF (ref 0–4)

## 2023-07-14 PROCEDURE — 74160 CT ABDOMEN W/CONTRAST: CPT

## 2023-07-14 PROCEDURE — 77063 BREAST TOMOSYNTHESIS BI: CPT

## 2023-07-14 PROCEDURE — 6360000004 HC RX CONTRAST MEDICATION: Performed by: INTERNAL MEDICINE

## 2023-07-14 PROCEDURE — 73630 X-RAY EXAM OF FOOT: CPT

## 2023-07-14 RX ADMIN — IOPAMIDOL 100 ML: 755 INJECTION, SOLUTION INTRAVENOUS at 15:06

## 2023-07-14 NOTE — TELEPHONE ENCOUNTER
----- Message from Jocelyn Glover MD sent at 7/14/2023 10:36 AM EDT -----  Please let patient know that her labs were normal, but her urine did show some concerns. We are awaiting the culture which was ordered. She had large amount of leukocytes. This could be a contaminate. No other worrisome findings were noted on her labs.

## 2023-07-15 LAB
BACTERIA SPEC CULT: NORMAL
SERVICE CMNT-IMP: NORMAL

## 2023-07-18 ENCOUNTER — TELEPHONE (OUTPATIENT)
Age: 58
End: 2023-07-18

## 2023-07-18 DIAGNOSIS — E89.40 POSTSURGICAL MENOPAUSE: ICD-10-CM

## 2023-07-18 DIAGNOSIS — Z13.820 SCREENING FOR OSTEOPOROSIS: Primary | ICD-10-CM

## 2023-07-18 NOTE — TELEPHONE ENCOUNTER
----- Message from Gunjan Ng MD sent at 7/17/2023  7:40 PM EDT -----  Result 1 of 3  Please let patient know that her urine culture was NEG for any infection at this time.

## 2023-07-18 NOTE — TELEPHONE ENCOUNTER
----- Message from Jaden Lundy MD sent at 7/17/2023  7:43 PM EDT -----  Result 3 of 3  Please let patient know that her foot films showed possible early signs of gout in the 1st and 5th toe of the L-foot. There was also some concern about possible osteopenia. If she has not had a bone density check, we should do one know. Otherwise, no fracture or other concerning findings. Thanks!

## 2023-09-15 ENCOUNTER — TELEPHONE (OUTPATIENT)
Age: 58
End: 2023-09-15

## 2023-09-18 ENCOUNTER — HOSPITAL ENCOUNTER (OUTPATIENT)
Facility: HOSPITAL | Age: 58
Discharge: HOME | End: 2023-09-21
Payer: COMMERCIAL

## 2023-09-18 DIAGNOSIS — R13.10 DYSPHAGIA, UNSPECIFIED TYPE: ICD-10-CM

## 2023-09-18 DIAGNOSIS — K44.9 HIATAL HERNIA: ICD-10-CM

## 2023-09-18 DIAGNOSIS — K21.9 CHRONIC GASTROESOPHAGEAL REFLUX DISEASE: ICD-10-CM

## 2023-09-18 PROCEDURE — 74220 X-RAY XM ESOPHAGUS 1CNTRST: CPT

## 2023-09-21 ENCOUNTER — PROCEDURE VISIT (OUTPATIENT)
Age: 58
End: 2023-09-21
Payer: COMMERCIAL

## 2023-09-21 DIAGNOSIS — G43.709 CHRONIC MIGRAINE WITHOUT AURA, NOT INTRACTABLE, WITHOUT STATUS MIGRAINOSUS: Primary | ICD-10-CM

## 2023-09-21 PROCEDURE — 64615 CHEMODENERV MUSC MIGRAINE: CPT | Performed by: NURSE PRACTITIONER

## 2023-09-21 NOTE — PROGRESS NOTES
OFFICE PROCEDURE PROGRESS NOTE        Chart reviewed for the following:   Carson JIANG APRN - NP, have reviewed the History, Physical and updated the Allergic reactions for Rosalind [address] performed immediately prior to start of procedure:   Carson JIANG APRN - NP, have performed the following reviews on Cristina Bedolla prior to the start of the procedure:            * Patient was identified by name and date of birth   * Agreement on procedure being performed was verified  * Risks and Benefits explained to the patient  * Procedure site verified and marked as necessary  * Patient was positioned for comfort  * Consent was signed and verified     Time: 1100      Date of procedure: 9/21/2023    Procedure performed by:  JULIO Chavez NP    Provider assisted by: None    Patient assisted by: None    How tolerated by patient: tolerated the procedure well with no complications    Post Procedural Pain Scale: 2 - Hurts Little Bit    Comments: None    LOT:  [id number]  EXP: 02/2026          Botox Injection Note       Indication: patient has chronic recurrent migraine, has 7-10 less migraine days per month with botox injections    Procedure:   Botox concentration: 200 units in 4 ml of preservative-free normal saline. 31 sites injections, distribution as follow      Units/site  Sites Sides Subtotal    Procerus 5 1 1 5    5 1 2 10   Frontalis 5 2 2 20   Temporalis 5 4 2 40   Occipitalis 5 3 2 30   Upper cervical paraspinalis 5 2 2 20   Trapezius 5 3 2 30         200 units Botox were reconstituted, 155 units injected as above and the remainder was unavoidably wasted.      Patient tolerated procedure well.     _____________________________   Karolina Guthrie

## 2023-12-05 ENCOUNTER — TELEPHONE (OUTPATIENT)
Age: 58
End: 2023-12-05

## 2023-12-13 ENCOUNTER — TELEPHONE (OUTPATIENT)
Age: 58
End: 2023-12-13

## 2023-12-14 ENCOUNTER — TELEPHONE (OUTPATIENT)
Age: 58
End: 2023-12-14

## 2023-12-22 ENCOUNTER — TELEPHONE (OUTPATIENT)
Age: 58
End: 2023-12-22

## 2023-12-22 NOTE — TELEPHONE ENCOUNTER
Patient calling to reschedule her Botox Appt. She stated this is her third call trying to reschedule. She stated if she don't answer please leave a detail message and she will make it happen.

## 2024-01-03 ENCOUNTER — TELEPHONE (OUTPATIENT)
Age: 59
End: 2024-01-03

## 2024-01-05 NOTE — TELEPHONE ENCOUNTER
Patient requesting a call to discuss whether she can get a sooner Botox appt.    Please call her instead of sending the message through My Chart.

## 2024-01-18 ENCOUNTER — HOSPITAL ENCOUNTER (EMERGENCY)
Facility: HOSPITAL | Age: 59
Discharge: HOME | End: 2024-01-18
Attending: EMERGENCY MEDICINE
Payer: COMMERCIAL

## 2024-01-18 VITALS
DIASTOLIC BLOOD PRESSURE: 80 MMHG | HEIGHT: 67 IN | BODY MASS INDEX: 22.08 KG/M2 | TEMPERATURE: 97.7 F | WEIGHT: 140.65 LBS | RESPIRATION RATE: 15 BRPM | OXYGEN SATURATION: 99 % | HEART RATE: 77 BPM | SYSTOLIC BLOOD PRESSURE: 120 MMHG

## 2024-01-18 DIAGNOSIS — I10 HYPERTENSION, UNSPECIFIED TYPE: Primary | ICD-10-CM

## 2024-01-18 LAB
ALBUMIN SERPL-MCNC: 3.8 G/DL (ref 3.5–5)
ALBUMIN/GLOB SERPL: 1.2 (ref 1.1–2.2)
ALP SERPL-CCNC: 72 U/L (ref 45–117)
ALT SERPL-CCNC: 17 U/L (ref 12–78)
ANION GAP SERPL CALC-SCNC: 5 MMOL/L (ref 5–15)
APPEARANCE UR: CLEAR
AST SERPL-CCNC: 14 U/L (ref 15–37)
BACTERIA URNS QL MICRO: NEGATIVE /HPF
BASOPHILS # BLD: 0.1 K/UL (ref 0–0.1)
BASOPHILS NFR BLD: 2 % (ref 0–1)
BILIRUB SERPL-MCNC: 0.4 MG/DL (ref 0.2–1)
BILIRUB UR QL: NEGATIVE
BUN SERPL-MCNC: 16 MG/DL (ref 6–20)
BUN/CREAT SERPL: 22 (ref 12–20)
CALCIUM SERPL-MCNC: 8.8 MG/DL (ref 8.5–10.1)
CHLORIDE SERPL-SCNC: 111 MMOL/L (ref 97–108)
CK SERPL-CCNC: 91 U/L (ref 26–192)
CO2 SERPL-SCNC: 24 MMOL/L (ref 21–32)
COLOR UR: ABNORMAL
CREAT SERPL-MCNC: 0.73 MG/DL (ref 0.55–1.02)
DIFFERENTIAL METHOD BLD: ABNORMAL
EOSINOPHIL # BLD: 0.3 K/UL (ref 0–0.4)
EOSINOPHIL NFR BLD: 5 % (ref 0–7)
EPITH CASTS URNS QL MICRO: ABNORMAL /LPF
ERYTHROCYTE [DISTWIDTH] IN BLOOD BY AUTOMATED COUNT: 13.4 % (ref 11.5–14.5)
GLOBULIN SER CALC-MCNC: 3.3 G/DL (ref 2–4)
GLUCOSE SERPL-MCNC: 118 MG/DL (ref 65–100)
GLUCOSE UR STRIP.AUTO-MCNC: NEGATIVE MG/DL
HCT VFR BLD AUTO: 37.5 % (ref 35–47)
HGB BLD-MCNC: 12.6 G/DL (ref 11.5–16)
HGB UR QL STRIP: NEGATIVE
HYALINE CASTS URNS QL MICRO: ABNORMAL /LPF (ref 0–2)
IMM GRANULOCYTES # BLD AUTO: 0 K/UL (ref 0–0.04)
IMM GRANULOCYTES NFR BLD AUTO: 0 % (ref 0–0.5)
KETONES UR QL STRIP.AUTO: NEGATIVE MG/DL
LEUKOCYTE ESTERASE UR QL STRIP.AUTO: ABNORMAL
LYMPHOCYTES # BLD: 1.7 K/UL (ref 0.8–3.5)
LYMPHOCYTES NFR BLD: 30 % (ref 12–49)
MCH RBC QN AUTO: 31 PG (ref 26–34)
MCHC RBC AUTO-ENTMCNC: 33.6 G/DL (ref 30–36.5)
MCV RBC AUTO: 92.1 FL (ref 80–99)
MONOCYTES # BLD: 0.5 K/UL (ref 0–1)
MONOCYTES NFR BLD: 9 % (ref 5–13)
NEUTS SEG # BLD: 3 K/UL (ref 1.8–8)
NEUTS SEG NFR BLD: 54 % (ref 32–75)
NITRITE UR QL STRIP.AUTO: NEGATIVE
NRBC # BLD: 0 K/UL (ref 0–0.01)
NRBC BLD-RTO: 0 PER 100 WBC
NT PRO BNP: 87 PG/ML
PH UR STRIP: 5.5 (ref 5–8)
PLATELET # BLD AUTO: 258 K/UL (ref 150–400)
PMV BLD AUTO: 9.6 FL (ref 8.9–12.9)
POTASSIUM SERPL-SCNC: 3.7 MMOL/L (ref 3.5–5.1)
PROT SERPL-MCNC: 7.1 G/DL (ref 6.4–8.2)
PROT UR STRIP-MCNC: NEGATIVE MG/DL
RBC # BLD AUTO: 4.07 M/UL (ref 3.8–5.2)
RBC #/AREA URNS HPF: ABNORMAL /HPF (ref 0–5)
SODIUM SERPL-SCNC: 140 MMOL/L (ref 136–145)
SP GR UR REFRACTOMETRY: 1.01 (ref 1–1.03)
TROPONIN I SERPL HS-MCNC: 5 NG/L (ref 0–51)
TSH SERPL DL<=0.05 MIU/L-ACNC: 3.26 UIU/ML (ref 0.36–3.74)
UROBILINOGEN UR QL STRIP.AUTO: 0.2 EU/DL (ref 0.2–1)
WBC # BLD AUTO: 5.5 K/UL (ref 3.6–11)
WBC URNS QL MICRO: ABNORMAL /HPF (ref 0–4)

## 2024-01-18 PROCEDURE — 84443 ASSAY THYROID STIM HORMONE: CPT

## 2024-01-18 PROCEDURE — 82550 ASSAY OF CK (CPK): CPT

## 2024-01-18 PROCEDURE — 85025 COMPLETE CBC W/AUTO DIFF WBC: CPT

## 2024-01-18 PROCEDURE — 84484 ASSAY OF TROPONIN QUANT: CPT

## 2024-01-18 PROCEDURE — 81001 URINALYSIS AUTO W/SCOPE: CPT

## 2024-01-18 PROCEDURE — 93005 ELECTROCARDIOGRAM TRACING: CPT | Performed by: EMERGENCY MEDICINE

## 2024-01-18 PROCEDURE — 99284 EMERGENCY DEPT VISIT MOD MDM: CPT

## 2024-01-18 PROCEDURE — 36415 COLL VENOUS BLD VENIPUNCTURE: CPT

## 2024-01-18 PROCEDURE — 80053 COMPREHEN METABOLIC PANEL: CPT

## 2024-01-18 PROCEDURE — 83880 ASSAY OF NATRIURETIC PEPTIDE: CPT

## 2024-01-18 ASSESSMENT — PAIN SCALES - GENERAL: PAINLEVEL_OUTOF10: 0

## 2024-01-18 ASSESSMENT — PAIN - FUNCTIONAL ASSESSMENT: PAIN_FUNCTIONAL_ASSESSMENT: 0-10

## 2024-01-18 NOTE — ED TRIAGE NOTES
Pt presents to the ED with c/o high BP.  No history of HTN  Also reports chest pain, nausea  since last night

## 2024-01-18 NOTE — ED PROVIDER NOTES
[facility] EMERGENCY DEPT  EMERGENCY DEPARTMENT ENCOUNTER      Pt Name: Rosalind Judd  MRN: [id number]  Birthdate 1965  Date of evaluation: 1/18/2024  Provider: Florin Yao MD    CHIEF COMPLAINT       Chief Complaint   Patient presents with    Hypertension         HISTORY OF PRESENT ILLNESS   (Location/Symptom, Timing/Onset, Context/Setting, Quality, Duration, Modifying Factors, Severity)  Note limiting factors.   58-year-old female with past medical history significant for anxiety, arthritis, hiatal hernia, GERD, chronic neck pain, fatigue, TIA who presents to the ER with a complaint of elevated blood pressure throughout the day this evening.  As well as intermittent episodes of chest pain.  The patient is currently pain-free and comfortable.  The patient states that she has been taking her blood pressure today and the diastolic was greater than 90.  She denies any fever and chills, neck and back pain, sore throat, cough, congestion, chest pain or shortness of air with exertion, nausea, vomiting, diarrhea, constipation, dysuria, dizziness, extremity weakness or numbness, sick contact, skin rash or recent travel, prior history of the same.            Review of External Medical Records:     Nursing Notes were reviewed.    REVIEW OF SYSTEMS    (2-9 systems for level 4, 10 or more for level 5)     Review of Systems   All other systems reviewed and are negative.      Except as noted above the remainder of the review of systems was reviewed and negative.       PAST MEDICAL HISTORY     Past Medical History:   Diagnosis Date    Anxiety     Arthritis     Cancer (HCC)     BASAL CELL SKIN CANCER ON CHEST    Chronic neck pain 6/27/2015    Fatigue     GERD (gastroesophageal reflux disease) 3/12/2014    Headache     Hiatal hernia     Injury of elbow, left 1/2/2015    Panic disorder     PUD (peptic ulcer disease)     Swelling of joint, elbow, left 1/2/2015    TIA (transient ischemic attack) 6/27/2015         SURGICAL

## 2024-01-19 LAB
EKG ATRIAL RATE: 66 BPM
EKG DIAGNOSIS: NORMAL
EKG P AXIS: 73 DEGREES
EKG P-R INTERVAL: 152 MS
EKG Q-T INTERVAL: 426 MS
EKG QRS DURATION: 84 MS
EKG QTC CALCULATION (BAZETT): 446 MS
EKG R AXIS: 63 DEGREES
EKG T AXIS: 40 DEGREES
EKG VENTRICULAR RATE: 66 BPM

## 2024-01-29 ENCOUNTER — TELEPHONE (OUTPATIENT)
Age: 59
End: 2024-01-29

## 2024-01-29 NOTE — TELEPHONE ENCOUNTER
Drug Acquisition: PHARMACY *FEBRUARY 2024 APPOINTMENT ONLY*  Drug: BOTOX 200 units  Dx: G43.709  Insurance: [employer]  Submission Type: CMM  Key: [id number]   PA Case ID: [id number]  [facility]:   CPT: 78262, No PA required  Approval Range: 06/01/23 to 05/31/24  _____________________________________    *BUY AND BILL GOING FORWARD*    Drug Acquisition: BUY AND BILL  Drug: BOTOX 200 units  Dx: G43.709  Insurance: [employer]  Submission Type: Availity  [facility]:   CPT: 52547, No PA required  Request Tracking ID: [id number]  Reference #: [id number]  Approval Range: DENIED    Our review showed that the care you've requested is Not Medically Necessary. We can not approve your request because your plan doesn't cover care this is Not Medically Necessary.

## 2024-01-30 ENCOUNTER — TELEPHONE (OUTPATIENT)
Age: 59
End: 2024-01-30

## 2024-01-30 NOTE — TELEPHONE ENCOUNTER
LVM for patient to call and reschedule 2/19 appointment with Dr Saldana to the next available, thanks.

## 2024-02-01 ENCOUNTER — PROCEDURE VISIT (OUTPATIENT)
Age: 59
End: 2024-02-01

## 2024-02-01 DIAGNOSIS — R20.2 PARESTHESIA OF LEFT ARM AND LEG: ICD-10-CM

## 2024-02-01 DIAGNOSIS — G43.709 CHRONIC MIGRAINE WITHOUT AURA, NOT INTRACTABLE, WITHOUT STATUS MIGRAINOSUS: Primary | ICD-10-CM

## 2024-02-01 RX ORDER — DICLOFENAC POTASSIUM 50 MG/1
POWDER, FOR SOLUTION ORAL
Qty: 9 EACH | Refills: 5 | Status: SHIPPED | OUTPATIENT
Start: 2024-02-01 | End: 2024-02-01 | Stop reason: SDUPTHER

## 2024-02-01 RX ORDER — DICLOFENAC POTASSIUM 50 MG/1
POWDER, FOR SOLUTION ORAL
Qty: 9 EACH | Refills: 5 | Status: SHIPPED | OUTPATIENT
Start: 2024-02-01

## 2024-02-01 NOTE — PROGRESS NOTES
OFFICE PROCEDURE PROGRESS NOTE        Chart reviewed for the following:   Elvin JIANG APRN - NP, have reviewed the History, Physical and updated the Allergic reactions for Rosalind Judd     TIME OUT performed immediately prior to start of procedure:   Elvin JIANG APRN - NP, have performed the following reviews on Rosalind Judd prior to the start of the procedure:            * Patient was identified by name and date of birth   * Agreement on procedure being performed was verified  * Risks and Benefits explained to the patient  * Procedure site verified and marked as necessary  * Patient was positioned for comfort  * Consent was signed and verified     Time: 1500      Date of procedure: 2/1/2024    Procedure performed by:  JULIO Gandara NP    Provider assisted by: None    Patient assisted by: None    How tolerated by patient: tolerated the procedure well with no complications    Post Procedural Pain Scale: 2 - Hurts Little Bit    Comments: None    LOT: [id number]  EXP: 05/2026          Botox Injection Note       Indication: patient has chronic recurrent migraine, has 7-10 less migraine days per month with botox injections    Procedure:   Botox concentration: 200 units in 4 ml of preservative-free normal saline.   31 sites injections, distribution as follow      Units/site  Sites Sides Subtotal    Procerus 5 1 1 5    5 1 2 10   Frontalis 5 2 2 20   Temporalis 5 4 2 40   Occipitalis 5 3 2 30   Upper cervical paraspinalis 5 2 2 20   Trapezius 5 3 2 30         200 units Botox were reconstituted, 155 units injected as above and the remainder was unavoidably wasted.     Patient tolerated procedure well.     _____________________________   ELVIN BADILLO         EMG left UE and left LE   Paresthesia X 5 months   Evaluate for radiculopathy

## 2024-02-16 ENCOUNTER — PROCEDURE VISIT (OUTPATIENT)
Age: 59
End: 2024-02-16

## 2024-02-16 DIAGNOSIS — R20.2 PARESTHESIA: Primary | ICD-10-CM

## 2024-02-16 NOTE — PROGRESS NOTES
>4.1 100.0 Wrist Abd Poll Brev 8.0     Elbow    7.1  14.0  98.6 Elbow Wrist 20.0 54 >49   Left Tibial Motor (Abd Ascencio Brev)  22.4 °C   Ankle    3.7 <6.1 9.7 >5.3 100.0 Ankle Abd Ascencio Brev 8.0     Knee    11.1  8.0  82.5 Knee Ankle 35.0 47 >39   Left Ulnar Motor (Abd Dig Minimi)  31 °C   Wrist    3.0 <3.1 15.4 >7.0 100.0 Wrist Abd Dig Minimi 8.0  >50   B Elbow    6.4  13.9  90.3 B Elbow Wrist 18.0 53 >50   A Elbow    8.3  12.2  87.8 A Elbow B Elbow 10.0 53 >50     Comparison Summary Table     Stim Site NR Peak (ms) P-T Amp (µV) Site1 Site2 Dist (cm) Delta-0 (ms)   Left Median/Ulnar Palm Comparison (Wrist)  30.8 °C   Median Palm    2.2 52.2 Median Palm Ulnar Palm 8.0 0.6   Ulnar Palm    1.9 42.5         F Wave Studies     NR F-Lat (ms) Lat Norm (ms) L-R F-Lat (ms) L-R Lat Norm   Left Tibial (Mrkrs) (Abd Hallucis)  22.4 °C      48.68 <56  <5.7   Left Ulnar (Mrkrs) (Abd Dig Min)  31 °C      25.96 <32  <2.5     H Reflex Studies     NR H-Lat (ms) L-R H-Lat (ms) L-R Lat Norm   Left Tibial (Gastroc)  22.4 °C      31.96  <2.0     EMG     Side Muscle Nerve Root Ins Act Fibs Psw Recrt Duration Amp Poly Comment   Left Ext Dig Brev Dp Br Peron L5, S1 Nml Nml Nml Nml Nml Nml Nml    Left AbdHallucis MedPlantar S1-2 Nml Nml Nml Nml Nml Nml Nml    Left AntTibialis Dp Br Peron L4-5 Nml Nml Nml Nml Nml Nml Nml    Left MedGastroc Tibial S1-2 Nml Nml Nml Nml Nml Nml Nml    Left VastusLat Femoral L2-4 Nml Nml Nml Nml Nml Nml Nml    Left GluteusMed SupGluteal L4-S1 Nml Nml Nml Nml Nml Nml Nml    Left Lower Lumb Parasp Rami L5,S1 Nml Nml Nml Nml Nml Nml Nml    Left 1stDorInt Ulnar C8-T1 Nml Nml Nml Nml Nml Nml Nml    Left ExtIndicis Radial (Post Int) C7-8 Nml Nml Nml Nml Nml Nml Nml    Left Abd Poll Brev Median C8-T1 Nml Nml Nml Nml Nml Nml Nml    Left Biceps Musculocut C5-6 Nml Nml Nml Nml Nml Nml Nml    Left Triceps Radial C6-7-8 Nml Nml Nml Nml Nml Nml Nml    Left Deltoid Axillary C5-6 Nml Nml Nml Nml Nml Nml Nml    Left Lower Cerv

## 2024-03-11 ENCOUNTER — TELEPHONE (OUTPATIENT)
Age: 59
End: 2024-03-11

## 2024-03-11 NOTE — TELEPHONE ENCOUNTER
Good Rx  the generic at [facility] is: 9 packets per 30 is $273.50     [employer]ia brand for same quantity is 927.16

## 2024-03-11 NOTE — TELEPHONE ENCOUNTER
Re: Cambia PA    Last reference to Cambia was on 6/030/20 for Cambia documenting 3 headaches.     1st Botox procedure note from 4/22/20 showed pt has 15 or more HA's.   All other procedure notes show patient has had less than 7-10 HA's.   There are 2 cases in CMM for Cambia with no clear response.   Called [facility] to submit over the phone      [phone]    Pending Case [id number]    Clinicals answered over phone/status in 24-72 hours.     Last time Cambia was approved by this plan was in 2022. She asked if we were requesting generic or brand.  I cannot identify which one without seeing Brand Only on the Rx.  Please revise to Brand as that is what I submitted from Faith's research on the phone today.     Sending details to Carson - Questions that need a response:     As to why Cambia is medically necessary vs. Taking the Diclofenac Potassium generic?   I can patient has tried Naproxen (which they asked if pt has tried an NSAID), Fiorivet and Voltaren.     Totat time spent researching chart and phone call:  90 minutes.

## 2024-03-12 ENCOUNTER — TELEPHONE (OUTPATIENT)
Age: 59
End: 2024-03-12

## 2024-03-12 NOTE — TELEPHONE ENCOUNTER
Re: Diclofenac Potassium     Per Carson, generic is okay     Called [facility] pharmacy [phone]     Generic goes through w/ a 10.00 copay. They have to order it as they have none in stock.     Jaz to Cielo/LEA

## 2024-04-30 ENCOUNTER — TELEPHONE (OUTPATIENT)
Age: 59
End: 2024-04-30

## 2024-04-30 NOTE — TELEPHONE ENCOUNTER
Patient has a current PA approved through [employer] they will deliver on 5/2/2024 spoke with Yessica.

## 2024-05-02 ENCOUNTER — TELEPHONE (OUTPATIENT)
Age: 59
End: 2024-05-02

## 2024-05-02 ENCOUNTER — PROCEDURE VISIT (OUTPATIENT)
Age: 59
End: 2024-05-02
Payer: COMMERCIAL

## 2024-05-02 DIAGNOSIS — G43.709 CHRONIC MIGRAINE WITHOUT AURA, NOT INTRACTABLE, WITHOUT STATUS MIGRAINOSUS: Primary | ICD-10-CM

## 2024-05-02 PROCEDURE — 64615 CHEMODENERV MUSC MIGRAINE: CPT | Performed by: NURSE PRACTITIONER

## 2024-05-02 RX ORDER — BUTALBITAL, ACETAMINOPHEN AND CAFFEINE 50; 325; 40 MG/1; MG/1; MG/1
TABLET ORAL
Qty: 60 TABLET | Refills: 5 | Status: SHIPPED | OUTPATIENT
Start: 2024-05-02

## 2024-05-02 NOTE — PROGRESS NOTES
OFFICE PROCEDURE PROGRESS NOTE        Chart reviewed for the following:   Elvin JIANG APRN - NP, have reviewed the History, Physical and updated the Allergic reactions for Rosalind Judd     TIME OUT performed immediately prior to start of procedure:   Elvin JIANG APRN - NP, have performed the following reviews on Rosalind Judd prior to the start of the procedure:            * Patient was identified by name and date of birth   * Agreement on procedure being performed was verified  * Risks and Benefits explained to the patient  * Procedure site verified and marked as necessary  * Patient was positioned for comfort  * Consent was signed and verified     Time: 1300      Date of procedure: 5/2/2024    Procedure performed by:  JULIO Gandara NP    Provider assisted by: None    Patient assisted by: None    How tolerated by patient: tolerated the procedure well with no complications    Post Procedural Pain Scale: 2 - Hurts Little Bit    Comments: None    LOT: [id number]  EXP: 06/2026          Botox Injection Note       Indication: patient has chronic recurrent migraine, has 7-10 less migraine days per month with botox injections    Procedure:   Botox concentration: 200 units in 4 ml of preservative-free normal saline.   31 sites injections, distribution as follow      Units/site  Sites Sides Subtotal    Procerus 5 1 1 5    5 1 2 10   Frontalis 5 2 2 20   Temporalis 5 4 2 40   Occipitalis 5 3 2 30   Upper cervical paraspinalis 5 2 2 20   Trapezius 5 3 2 30         200 units Botox were reconstituted, 155 units injected as above and the remainder was unavoidably wasted.     Patient tolerated procedure well.     _____________________________   ELVIN BADILLO

## 2024-07-18 ENCOUNTER — TELEPHONE (OUTPATIENT)
Age: 59
End: 2024-07-18

## 2024-07-26 ENCOUNTER — OFFICE VISIT (OUTPATIENT)
Age: 59
End: 2024-07-26
Payer: COMMERCIAL

## 2024-07-26 DIAGNOSIS — G43.709 CHRONIC MIGRAINE WITHOUT AURA, NOT INTRACTABLE, WITHOUT STATUS MIGRAINOSUS: Primary | ICD-10-CM

## 2024-07-26 PROCEDURE — 64615 CHEMODENERV MUSC MIGRAINE: CPT | Performed by: NURSE PRACTITIONER

## 2024-07-26 NOTE — PROGRESS NOTES
OFFICE PROCEDURE PROGRESS NOTE        Chart reviewed for the following:   Elvin JIANG APRN - NP, have reviewed the History, Physical and updated the Allergic reactions for Rosalind Judd     TIME OUT performed immediately prior to start of procedure:   Elvin JIANG APRN - NP, have performed the following reviews on Rosalind Judd prior to the start of the procedure:            * Patient was identified by name and date of birth   * Agreement on procedure being performed was verified  * Risks and Benefits explained to the patient  * Procedure site verified and marked as necessary  * Patient was positioned for comfort  * Consent was signed and verified     Time: 1000      Date of procedure: 7/26/2024    Procedure performed by:  JULIO Gandara NP    Provider assisted by: None    Patient assisted by: None    How tolerated by patient: tolerated the procedure well with no complications    Post Procedural Pain Scale: 2 - Hurts Little Bit    Comments: None    12/2026  [id number]      Botox Injection Note       Indication: patient has chronic recurrent migraine, has 7-10 less migraine days per month with botox injections    Procedure:   Botox concentration: 200 units in 4 ml of preservative-free normal saline.   31 sites injections, distribution as follow      Units/site  Sites Sides Subtotal    Procerus 5 1 1 5    5 1 2 10   Frontalis 5 2 2 20   Temporalis 5 4 2 40   Occipitalis 5 3 2 30   Upper cervical paraspinalis 5 2 2 20   Trapezius 5 3 2 30         200 units Botox were reconstituted, 155 units injected as above and the remainder was unavoidably wasted.     Patient tolerated procedure well.     _____________________________   ELVIN BADILLO

## 2024-10-15 ENCOUNTER — TELEPHONE (OUTPATIENT)
Age: 59
End: 2024-10-15

## 2024-10-15 NOTE — TELEPHONE ENCOUNTER
Spoke with Patsy godinez [facility] she will have botox delivered on 11/17/2024

## 2024-10-17 ENCOUNTER — TELEPHONE (OUTPATIENT)
Age: 59
End: 2024-10-17

## 2024-10-17 ENCOUNTER — LAB (OUTPATIENT)
Age: 59
End: 2024-10-17

## 2024-10-17 ENCOUNTER — OFFICE VISIT (OUTPATIENT)
Age: 59
End: 2024-10-17

## 2024-10-17 VITALS
SYSTOLIC BLOOD PRESSURE: 108 MMHG | HEIGHT: 67 IN | DIASTOLIC BLOOD PRESSURE: 82 MMHG | HEART RATE: 85 BPM | OXYGEN SATURATION: 98 % | TEMPERATURE: 97.8 F | BODY MASS INDEX: 20.25 KG/M2 | WEIGHT: 129 LBS

## 2024-10-17 DIAGNOSIS — Z23 ENCOUNTER FOR IMMUNIZATION: ICD-10-CM

## 2024-10-17 DIAGNOSIS — R19.5 CHANGE IN STOOL: ICD-10-CM

## 2024-10-17 DIAGNOSIS — R63.4 UNINTENTIONAL WEIGHT LOSS: ICD-10-CM

## 2024-10-17 DIAGNOSIS — R53.83 MALAISE AND FATIGUE: ICD-10-CM

## 2024-10-17 DIAGNOSIS — R63.4 WEIGHT LOSS: ICD-10-CM

## 2024-10-17 DIAGNOSIS — R53.81 MALAISE AND FATIGUE: ICD-10-CM

## 2024-10-17 DIAGNOSIS — Z11.4 SCREENING FOR HIV (HUMAN IMMUNODEFICIENCY VIRUS): ICD-10-CM

## 2024-10-17 DIAGNOSIS — R63.4 UNINTENTIONAL WEIGHT LOSS: Primary | ICD-10-CM

## 2024-10-17 DIAGNOSIS — K44.9 HIATAL HERNIA: ICD-10-CM

## 2024-10-17 DIAGNOSIS — E55.9 VITAMIN D DEFICIENCY: ICD-10-CM

## 2024-10-17 PROBLEM — R42 DIZZINESS: Status: RESOLVED | Noted: 2021-06-02 | Resolved: 2024-10-17

## 2024-10-17 RX ORDER — CALCIUM CARBONATE 500(1250)
500 TABLET ORAL DAILY
COMMUNITY

## 2024-10-17 RX ORDER — MINOXIDIL 2.5 MG/1
TABLET ORAL
COMMUNITY
Start: 2024-08-28

## 2024-10-17 RX ORDER — BUSPIRONE HYDROCHLORIDE 7.5 MG/1
7.5 TABLET ORAL 2 TIMES DAILY
COMMUNITY
Start: 2024-10-11

## 2024-10-17 RX ORDER — PANTOPRAZOLE SODIUM 40 MG/1
40 TABLET, DELAYED RELEASE ORAL 2 TIMES DAILY
COMMUNITY
Start: 2024-08-10

## 2024-10-17 ASSESSMENT — PATIENT HEALTH QUESTIONNAIRE - PHQ9
SUM OF ALL RESPONSES TO PHQ QUESTIONS 1-9: 0
SUM OF ALL RESPONSES TO PHQ QUESTIONS 1-9: 0
1. LITTLE INTEREST OR PLEASURE IN DOING THINGS: NOT AT ALL
SUM OF ALL RESPONSES TO PHQ QUESTIONS 1-9: 0
SUM OF ALL RESPONSES TO PHQ QUESTIONS 1-9: 0
2. FEELING DOWN, DEPRESSED OR HOPELESS: NOT AT ALL
SUM OF ALL RESPONSES TO PHQ9 QUESTIONS 1 & 2: 0

## 2024-10-17 NOTE — PROGRESS NOTES
Identified pt with two pt identifiers(name and ). Reviewed record in preparation for visit and have obtained necessary documentation. All patient medications has been reviewed.  Chief Complaint   Patient presents with    Follow-up     Labs       Vitals:    10/17/24 1452   BP: 108/82   Pulse: 85   Temp: 97.8 °F (36.6 °C)   SpO2: 98%                   Coordination of Care Questionnaire:   1) Have you been to an emergency room, urgent care, or hospitalized since your last visit? no    2. Have seen or consulted any other health care provider since your last visit? no

## 2024-10-17 NOTE — PROGRESS NOTES
[facility]   Follow Up Progress Note  Patient: Rosalind Judd  1965, 58 y.o., female  Encounter Date: 10/17/24    CHIEF COMPLAINT:  Chief Complaint   Patient presents with    Follow-up     Labs       ASSESSMENT & PLAN:    ICD-10-CM    1. Unintentional weight loss  R63.4 Magnesium     Vitamin B12     Lipase     Baylee Hodgson MD, Colorectal Surgery, [facility]      2. Hiatal hernia  K44.9 Baylee Hodgson MD, Colorectal Surgery, [facility]      3. Weight loss  R63.4 CBC with Auto Differential     Comprehensive Metabolic Panel     Magnesium      4. Malaise and fatigue  R53.81 CBC with Auto Differential    R53.83 TSH     T4, Free     Vitamin B12     Methylmalonic Acid, Serum     Baylee Hodgson MD, Colorectal Surgery, [facility]      5. Vitamin D deficiency  E55.9 Vitamin D 25 Hydroxy      6. Change in stool  R19.5 Baylee Hodgson MD, Colorectal Surgery, [facility]      7. Screening for HIV (human immunodeficiency virus)  Z11.4 HIV 1/2 Ag/Ab, 4TH Generation,W Rflx Confirm      8. Encounter for immunization  Z23 Influenza, FLUCELVAX Trivalent, (age 6 mo+) IM, Preservative Free, 0.5mL        I have discussed the diagnosis with the patient and the intended treatment plan as seen in the above orders. The patient has received an after-visit summary and questions were answered concerning future plans. Asked to return should symptoms worsen or not improve with treatment. Any pending labs and studies will be relayed to patient when they become available.     Pt verbalizes understanding of plan of care and denies further questions or concerns at this time    No follow-ups on file.    SUBJECTIVE  Rosalind Judd is a 58 y.o. female presenting today for follow up of: unintentional weight loss and concerns about worsening hiatal hernia. She has also noticed some change in her stools. She has not had a colonoscopy recently and due.

## 2024-10-18 ENCOUNTER — OFFICE VISIT (OUTPATIENT)
Age: 59
End: 2024-10-18
Payer: COMMERCIAL

## 2024-10-18 DIAGNOSIS — G43.709 CHRONIC MIGRAINE WITHOUT AURA, NOT INTRACTABLE, WITHOUT STATUS MIGRAINOSUS: Primary | ICD-10-CM

## 2024-10-18 LAB
25(OH)D3 SERPL-MCNC: 51.3 NG/ML (ref 30–100)
ALBUMIN SERPL-MCNC: 3.9 G/DL (ref 3.5–5)
ALBUMIN/GLOB SERPL: 1.3 (ref 1.1–2.2)
ALP SERPL-CCNC: 88 U/L (ref 45–117)
ALT SERPL-CCNC: 28 U/L (ref 12–78)
ANION GAP SERPL CALC-SCNC: 5 MMOL/L (ref 2–12)
AST SERPL-CCNC: 22 U/L (ref 15–37)
BASOPHILS # BLD: 0 K/UL (ref 0–0.1)
BASOPHILS NFR BLD: 1 % (ref 0–1)
BILIRUB SERPL-MCNC: 0.4 MG/DL (ref 0.2–1)
BUN SERPL-MCNC: 16 MG/DL (ref 6–20)
BUN/CREAT SERPL: 25 (ref 12–20)
CALCIUM SERPL-MCNC: 9.6 MG/DL (ref 8.5–10.1)
CHLORIDE SERPL-SCNC: 107 MMOL/L (ref 97–108)
CO2 SERPL-SCNC: 29 MMOL/L (ref 21–32)
CREAT SERPL-MCNC: 0.63 MG/DL (ref 0.55–1.02)
DIFFERENTIAL METHOD BLD: NORMAL
EOSINOPHIL # BLD: 0.2 K/UL (ref 0–0.4)
EOSINOPHIL NFR BLD: 3 % (ref 0–7)
ERYTHROCYTE [DISTWIDTH] IN BLOOD BY AUTOMATED COUNT: 13.3 % (ref 11.5–14.5)
GLOBULIN SER CALC-MCNC: 2.9 G/DL (ref 2–4)
GLUCOSE SERPL-MCNC: 108 MG/DL (ref 65–100)
HCT VFR BLD AUTO: 40.8 % (ref 35–47)
HGB BLD-MCNC: 13.1 G/DL (ref 11.5–16)
HIV 1+2 AB+HIV1 P24 AG SERPL QL IA: NONREACTIVE
HIV 1/2 RESULT COMMENT: NORMAL
IMM GRANULOCYTES # BLD AUTO: 0 K/UL (ref 0–0.04)
IMM GRANULOCYTES NFR BLD AUTO: 0 % (ref 0–0.5)
LIPASE SERPL-CCNC: 35 U/L (ref 13–75)
LYMPHOCYTES # BLD: 2.2 K/UL (ref 0.8–3.5)
LYMPHOCYTES NFR BLD: 34 % (ref 12–49)
MAGNESIUM SERPL-MCNC: 2.3 MG/DL (ref 1.6–2.4)
MCH RBC QN AUTO: 30.3 PG (ref 26–34)
MCHC RBC AUTO-ENTMCNC: 32.1 G/DL (ref 30–36.5)
MCV RBC AUTO: 94.4 FL (ref 80–99)
MONOCYTES # BLD: 0.5 K/UL (ref 0–1)
MONOCYTES NFR BLD: 8 % (ref 5–13)
NEUTS SEG # BLD: 3.5 K/UL (ref 1.8–8)
NEUTS SEG NFR BLD: 54 % (ref 32–75)
NRBC # BLD: 0 K/UL (ref 0–0.01)
NRBC BLD-RTO: 0 PER 100 WBC
PLATELET # BLD AUTO: 319 K/UL (ref 150–400)
PMV BLD AUTO: 10 FL (ref 8.9–12.9)
POTASSIUM SERPL-SCNC: 4.2 MMOL/L (ref 3.5–5.1)
PROT SERPL-MCNC: 6.8 G/DL (ref 6.4–8.2)
RBC # BLD AUTO: 4.32 M/UL (ref 3.8–5.2)
SODIUM SERPL-SCNC: 141 MMOL/L (ref 136–145)
T4 FREE SERPL-MCNC: 0.8 NG/DL (ref 0.8–1.5)
TSH SERPL DL<=0.05 MIU/L-ACNC: 1.45 UIU/ML (ref 0.36–3.74)
VIT B12 SERPL-MCNC: 470 PG/ML (ref 193–986)
WBC # BLD AUTO: 6.4 K/UL (ref 3.6–11)

## 2024-10-18 PROCEDURE — 64615 CHEMODENERV MUSC MIGRAINE: CPT | Performed by: NURSE PRACTITIONER

## 2024-10-21 NOTE — PROGRESS NOTES
OFFICE PROCEDURE PROGRESS NOTE        Chart reviewed for the following:   Elvin JIANG APRN - NP, have reviewed the History, Physical and updated the Allergic reactions for Rosalind Judd     TIME OUT performed immediately prior to start of procedure:   Elvin JIANG APRN - NP, have performed the following reviews on Rosalind Judd prior to the start of the procedure:            * Patient was identified by name and date of birth   * Agreement on procedure being performed was verified  * Risks and Benefits explained to the patient  * Procedure site verified and marked as necessary  * Patient was positioned for comfort  * Consent was signed and verified     Time: 1000      Date of procedure: 10/21/2024    Procedure performed by:  JULIO Gandara NP    Provider assisted by: None    Patient assisted by: None    How tolerated by patient: tolerated the procedure well with no complications    Post Procedural Pain Scale: 2 - Hurts Little Bit    Comments: None    [id number]  02/2027   Patient supplied     Botox Injection Note       Indication: patient has chronic recurrent migraine, has 7-10 less migraine days per month with botox injections    Procedure:   Botox concentration: 200 units in 4 ml of preservative-free normal saline.   31 sites injections, distribution as follow      Units/site  Sites Sides Subtotal    Procerus 5 1 1 5    5 1 2 10   Frontalis 5 2 2 20   Temporalis 5 4 2 40   Occipitalis 5 3 2 30   Upper cervical paraspinalis 5 2 2 20   Trapezius 5 3 2 30         200 units Botox were reconstituted, 155 units injected as above and the remainder was unavoidably wasted.     Patient tolerated procedure well.     _____________________________   ELVIN BADILLO

## 2024-10-23 LAB — METHYLMALONATE SERPL-SCNC: 420 NMOL/L (ref 0–378)

## 2024-10-24 ENCOUNTER — TELEPHONE (OUTPATIENT)
Age: 59
End: 2024-10-24

## 2024-10-24 NOTE — TELEPHONE ENCOUNTER
----- Message from Dr. Evangelina Rodriguez MD sent at 10/23/2024 10:16 PM EDT -----  Please let patient know that while her B12 was normal. Her MMA was slightly elevated which could mean that over all, she has slightly low vitamin B12. I recommend that she take 500-1000 mcgs over the counter vitamin B12. Other labs showed no worrisome features.

## 2024-10-24 NOTE — TELEPHONE ENCOUNTER
Sent a [employer] message with results-patient is active in [employer].

## 2024-11-20 ENCOUNTER — OFFICE VISIT (OUTPATIENT)
Age: 59
End: 2024-11-20
Payer: COMMERCIAL

## 2024-11-20 VITALS
TEMPERATURE: 98.6 F | DIASTOLIC BLOOD PRESSURE: 78 MMHG | OXYGEN SATURATION: 99 % | HEART RATE: 63 BPM | BODY MASS INDEX: 21.19 KG/M2 | SYSTOLIC BLOOD PRESSURE: 119 MMHG | RESPIRATION RATE: 16 BRPM | WEIGHT: 135 LBS | HEIGHT: 67 IN

## 2024-11-20 DIAGNOSIS — H65.01 RIGHT ACUTE SEROUS OTITIS MEDIA, RECURRENCE NOT SPECIFIED: Primary | ICD-10-CM

## 2024-11-20 DIAGNOSIS — H69.91 DYSFUNCTION OF RIGHT EUSTACHIAN TUBE: ICD-10-CM

## 2024-11-20 PROCEDURE — 3074F SYST BP LT 130 MM HG: CPT | Performed by: INTERNAL MEDICINE

## 2024-11-20 PROCEDURE — 3078F DIAST BP <80 MM HG: CPT | Performed by: INTERNAL MEDICINE

## 2024-11-20 PROCEDURE — 99213 OFFICE O/P EST LOW 20 MIN: CPT | Performed by: INTERNAL MEDICINE

## 2024-11-20 RX ORDER — PREDNISONE 10 MG/1
TABLET ORAL
Qty: 21 EACH | Refills: 0 | Status: SHIPPED | OUTPATIENT
Start: 2024-11-20

## 2024-11-20 RX ORDER — AMOXICILLIN 500 MG/1
500 CAPSULE ORAL 2 TIMES DAILY
Qty: 14 CAPSULE | Refills: 0 | Status: SHIPPED | OUTPATIENT
Start: 2024-11-20 | End: 2024-11-27

## 2024-11-20 SDOH — ECONOMIC STABILITY: INCOME INSECURITY: HOW HARD IS IT FOR YOU TO PAY FOR THE VERY BASICS LIKE FOOD, HOUSING, MEDICAL CARE, AND HEATING?: NOT VERY HARD

## 2024-11-20 SDOH — ECONOMIC STABILITY: FOOD INSECURITY: WITHIN THE PAST 12 MONTHS, YOU WORRIED THAT YOUR FOOD WOULD RUN OUT BEFORE YOU GOT MONEY TO BUY MORE.: NEVER TRUE

## 2024-11-20 SDOH — ECONOMIC STABILITY: FOOD INSECURITY: WITHIN THE PAST 12 MONTHS, THE FOOD YOU BOUGHT JUST DIDN'T LAST AND YOU DIDN'T HAVE MONEY TO GET MORE.: NEVER TRUE

## 2024-11-20 NOTE — PROGRESS NOTES
Chief Complaint   Patient presents with    Ear Fullness     /78   Pulse 63   Temp 98.6 °F (37 °C)   Resp 16   Ht 1.702 m (5' 7\")   Wt 61.2 kg (135 lb)   SpO2 99%   BMI 21.14 kg/m²   \"Have you been to the ER, urgent care clinic since your last visit?  Hospitalized since your last visit?\"    NO    “Have you seen or consulted any other health care providers outside our system since your last visit?”    NO      “Have you had a colorectal cancer screening such as a colonoscopy/FIT/Cologuard?    YES    No colonoscopy on file  No cologuard on file  No FIT/FOBT on file   No flexible sigmoidoscopy on file

## 2025-01-20 ENCOUNTER — TELEPHONE (OUTPATIENT)
Age: 60
End: 2025-01-20

## 2025-01-22 ENCOUNTER — TELEPHONE (OUTPATIENT)
Age: 60
End: 2025-01-22

## 2025-01-22 ENCOUNTER — COMMUNITY OUTREACH (OUTPATIENT)
Age: 60
End: 2025-01-22

## 2025-01-22 NOTE — TELEPHONE ENCOUNTER
Botox 200 units delivered  RX#[id number]  2 refills  [facility]  [phone]

## 2025-01-24 ENCOUNTER — OFFICE VISIT (OUTPATIENT)
Age: 60
End: 2025-01-24

## 2025-01-24 DIAGNOSIS — M79.601 RIGHT ARM PAIN: ICD-10-CM

## 2025-01-24 DIAGNOSIS — R29.898 RIGHT ARM WEAKNESS: Primary | ICD-10-CM

## 2025-01-24 DIAGNOSIS — R29.2 HYPER REFLEXIA: ICD-10-CM

## 2025-01-24 DIAGNOSIS — G43.709 CHRONIC MIGRAINE WITHOUT AURA, NOT INTRACTABLE, WITHOUT STATUS MIGRAINOSUS: ICD-10-CM

## 2025-01-24 DIAGNOSIS — R20.2 ARM PARESTHESIA, RIGHT: ICD-10-CM

## 2025-01-24 NOTE — PROGRESS NOTES
OFFICE PROCEDURE PROGRESS NOTE        Chart reviewed for the following:   Elvin JIANG APRN - NP, have reviewed the History, Physical and updated the Allergic reactions for Rosalind Judd     TIME OUT performed immediately prior to start of procedure:   Elvin JIANG APRN - NP, have performed the following reviews on Rosalind Judd prior to the start of the procedure:            * Patient was identified by name and date of birth   * Agreement on procedure being performed was verified  * Risks and Benefits explained to the patient  * Procedure site verified and marked as necessary  * Patient was positioned for comfort  * Consent was signed and verified     Time: 1120      Date of procedure: 1/24/2025    Procedure performed by:  JULIO Gandara NP    Provider assisted by: None    Patient assisted by: None    How tolerated by patient: tolerated the procedure well with no complications    Post Procedural Pain Scale: 2 - Hurts Little Bit    Comments: None    LOT: [id number]  EXP: 4/1/2027    Patient supplied             Botox Injection Note       Indication: patient has chronic recurrent migraine, has 7-10 less migraine days per month with botox injections    Procedure:   Botox concentration: 200 units in 4 ml of preservative-free normal saline.   31 sites injections, distribution as follow      Units/site  Sites Sides Subtotal    Procerus 5 1 1 5    5 1 2 10   Frontalis 5 2 2 20   Temporalis 5 4 2 40   Occipitalis 5 3 2 30   Upper cervical paraspinalis 5 2 2 20   Trapezius 5 3 2 30         200 units Botox were reconstituted, 155 units injected as above and the remainder was unavoidably wasted.     Patient tolerated procedure well.     _____________________________   ELVIN BADILLO       
10/17/2024    HCT 40.8 10/17/2024    MCV 94.4 10/17/2024     10/17/2024     Lab Results   Component Value Date     10/17/2024    K 4.2 10/17/2024     10/17/2024    CO2 29 10/17/2024    BUN 16 10/17/2024    CREATININE 0.63 10/17/2024    GLUCOSE 108 (H) 10/17/2024    CALCIUM 9.6 10/17/2024    BILITOT 0.4 10/17/2024    ALKPHOS 88 10/17/2024    AST 22 10/17/2024    ALT 28 10/17/2024    LABGLOM >90 10/17/2024    GFRAA >60 02/21/2022    AGRATIO 1.5 02/21/2022    GLOB 2.9 10/17/2024       Lab Results   Component Value Date    CHOL 232 (H) 06/03/2021     Lab Results   Component Value Date    TRIG 76 06/03/2021     Lab Results   Component Value Date     06/03/2021     No components found for: \"LDLCHOLESTEROL\", \"LDLCALC\"  Lab Results   Component Value Date    VLDL 15.2 06/03/2021     Lab Results   Component Value Date    CHOLHDLRATIO 1.9 06/03/2021       Lab Results   Component Value Date    SEDRATE 5 07/13/2023       Hemoglobin A1C   Date Value Ref Range Status   06/03/2021 5.3 4.0 - 5.6 % Final     Comment:     NEW METHOD  PLEASE NOTE NEW REFERENCE RANGE  (NOTE)  HbA1C Interpretive Ranges  <5.7              Normal  5.7 - 6.4         Consider Prediabetes  >6.5              Consider Diabetes       No results found for: \"DELMAR\"             1. Right arm weakness  -     MRI CERVICAL SPINE WO CONTRAST; Future  2. Arm paresthesia, right  -     MRI CERVICAL SPINE WO CONTRAST; Future  3. Right arm pain  -     MRI CERVICAL SPINE WO CONTRAST; Future  4. Chronic migraine without aura, not intractable, without status migrainosus  -     CHEMODERVATE FACIAL/TRIGEM/CERV MUSC MIGRAINE  5. Hyper reflexia  -     MRI CERVICAL SPINE WO CONTRAST; Future     Cervical spine MRI to evaluate specifically for myelopathy vs. Radiculopathy   Has failed OTC, heat, stretching, PT exercises and things continue to get worse   She is in pain and deviating from daily activity due to this.     Fu after.         This note was created

## 2025-02-06 ENCOUNTER — HOSPITAL ENCOUNTER (OUTPATIENT)
Facility: HOSPITAL | Age: 60
Discharge: HOME | End: 2025-02-09
Payer: COMMERCIAL

## 2025-02-06 DIAGNOSIS — R29.898 RIGHT ARM WEAKNESS: ICD-10-CM

## 2025-02-06 DIAGNOSIS — M79.601 RIGHT ARM PAIN: ICD-10-CM

## 2025-02-06 DIAGNOSIS — R20.2 ARM PARESTHESIA, RIGHT: ICD-10-CM

## 2025-02-06 DIAGNOSIS — R29.2 HYPER REFLEXIA: ICD-10-CM

## 2025-02-06 PROCEDURE — 72141 MRI NECK SPINE W/O DYE: CPT

## 2025-02-13 ENCOUNTER — OFFICE VISIT (OUTPATIENT)
Age: 60
End: 2025-02-13
Payer: COMMERCIAL

## 2025-02-13 VITALS
BODY MASS INDEX: 21.97 KG/M2 | DIASTOLIC BLOOD PRESSURE: 80 MMHG | HEIGHT: 67 IN | HEART RATE: 78 BPM | OXYGEN SATURATION: 98 % | WEIGHT: 140 LBS | SYSTOLIC BLOOD PRESSURE: 122 MMHG

## 2025-02-13 DIAGNOSIS — R07.9 CHEST PAIN, UNSPECIFIED TYPE: Primary | ICD-10-CM

## 2025-02-13 PROCEDURE — 99213 OFFICE O/P EST LOW 20 MIN: CPT | Performed by: INTERNAL MEDICINE

## 2025-02-13 PROCEDURE — 93000 ELECTROCARDIOGRAM COMPLETE: CPT | Performed by: INTERNAL MEDICINE

## 2025-02-13 PROCEDURE — 3074F SYST BP LT 130 MM HG: CPT | Performed by: INTERNAL MEDICINE

## 2025-02-13 PROCEDURE — 3079F DIAST BP 80-89 MM HG: CPT | Performed by: INTERNAL MEDICINE

## 2025-02-13 NOTE — PROGRESS NOTES
Francisco Saldana MD      [id number],[facility] [place] [address]      Office [phone],Fax [phone]         Rosalind Judd is a 57 y.o.  female is here for f/u visit.      CC - as in       Dear Ms Jett,       I had the pleasure of seeing Ms Judd in the office today.           Assessment:       Chest tightness-atypical  History of palpitations   Hx of Anxiety         Plan:      Regular stress test-3/2023-normal, recommended coronary calcium score  History of orthostatic hypotension previously-has been on Florinef.  However she has been doing well off medications and has not had any further episodes of dizziness recently   Aggressive CV risk factor modification.  Follow-up 1 year/earlier as needed      Patient understands the plan. All questions were answered to the patient's satisfaction.      Medication Side Effects and Warnings were discussed with patient: yes   Patient Labs were reviewed and or requested:  yes   Patient Past Records were reviewed and or requested: yes      I appreciate the opportunity to be involved in . See note below for details. Please do not hesitate to contact us with questions  or concerns.      Francisco Saldaan MD      Cardiac Testing/ Procedures:      A.Cardiac Cath/PCI:      B.ECHO/MRELY:  3/16/21 · LV: Calculated LVEF is 65%. Biplane method used to measure ejection fraction. Normal cavity size, wall thickness, systolic function (ejection fraction normal) and diastolic  function. Wall motion: normal.TV: Mild tricuspid valve regurgitation is present.PA: Pulmonary arterial systolic pressure is 28 mmHg.AO: Mild ascending aorta dilatation. Ascending aorta diameter = 3.8 cm.Mildly increased LVOT velocity - 1.6 m/sec; no obstruction       C.StressNuclear/Stress ECHO/Stress test: 3/30/2023-normal stress test   Stress test - nml 3/2019       D.Vascular: 10/8/21 - Resting LUIS's are normal at 1.22 on the right and 1.20 on the left.

## 2025-02-13 NOTE — PROGRESS NOTES
Chief Complaint   Patient presents with    chest tightness    Palpitations    Dizziness     Vitals:    02/13/25 1547   BP: 122/80   Site: Left Upper Arm   Position: Sitting   Pulse: 78   SpO2: 98%   Weight: 63.5 kg (140 lb)   Height: 1.702 m (5' 7\")         Chest pain: Yes, states she is having some discomfort in her chest. States that she believes this is muscle related. 3 on pain scale.     Recent hospital stays: DENIED     Refills: DENIED

## 2025-02-25 ENCOUNTER — TRANSCRIBE ORDERS (OUTPATIENT)
Facility: HOSPITAL | Age: 60
End: 2025-02-25

## 2025-02-25 DIAGNOSIS — Z00.00 EXAMINATION: Primary | ICD-10-CM

## 2025-02-27 ENCOUNTER — HOSPITAL ENCOUNTER (OUTPATIENT)
Facility: HOSPITAL | Age: 60
Discharge: HOME | End: 2025-02-27
Attending: INTERNAL MEDICINE

## 2025-02-27 DIAGNOSIS — Z00.00 EXAMINATION: ICD-10-CM

## 2025-02-27 PROCEDURE — 75571 CT HRT W/O DYE W/CA TEST: CPT

## 2025-03-03 NOTE — RESULT ENCOUNTER NOTE
Total calcium score of 864 - indicating plaque with calcium deposition in coronary arteries  Aggressive cardiac risk factor modification -bp/ chol/diet/exercise  Monitor cardiac symptoms - CP/Dyspnea   Reschedule yearly appt for 6 months

## 2025-03-04 ENCOUNTER — TELEPHONE (OUTPATIENT)
Age: 60
End: 2025-03-04

## 2025-03-04 NOTE — TELEPHONE ENCOUNTER
----- Message from Dr. Francisco Saldana MD sent at 3/3/2025  2:19 PM EST -----  Total calcium score of 864 - indicating plaque with calcium deposition in coronary arteries  Aggressive cardiac risk factor modification -bp/ chol/diet/exercise  Monitor cardiac symptoms - CP/Dyspnea   Reschedule yearly appt for 6 months

## 2025-03-04 NOTE — TELEPHONE ENCOUNTER
Called patient to discuss test findings and provider recommendations.  Left VM and sent [employer] message.

## 2025-03-04 NOTE — TELEPHONE ENCOUNTER
Spoke with patient and reviewed elevated calcium score of 864.  Patient states she currently follows a heart healthy diet and exercises regularly so she not sure what else she can do.    Wants to make sure you are aware she takes an OTC calcium supplement d/t osteopenia and pantoprazole for ulcers.    Last lipid panel was 2021.  Repeat?    Rescheduled yearly to 6 months.    Please advise

## 2025-03-16 ENCOUNTER — HOSPITAL ENCOUNTER (EMERGENCY)
Facility: HOSPITAL | Age: 60
Discharge: HOME | End: 2025-03-16
Attending: EMERGENCY MEDICINE
Payer: COMMERCIAL

## 2025-03-16 VITALS
DIASTOLIC BLOOD PRESSURE: 83 MMHG | WEIGHT: 138.01 LBS | OXYGEN SATURATION: 98 % | HEIGHT: 67 IN | SYSTOLIC BLOOD PRESSURE: 136 MMHG | TEMPERATURE: 98.9 F | RESPIRATION RATE: 22 BRPM | HEART RATE: 63 BPM | BODY MASS INDEX: 21.66 KG/M2

## 2025-03-16 DIAGNOSIS — R07.9 CHEST PAIN, UNSPECIFIED TYPE: Primary | ICD-10-CM

## 2025-03-16 LAB
ALBUMIN SERPL-MCNC: 3.8 G/DL (ref 3.5–5)
ALBUMIN/GLOB SERPL: 1.5 (ref 1.1–2.2)
ALP SERPL-CCNC: 82 U/L (ref 45–117)
ALT SERPL-CCNC: 24 U/L (ref 12–78)
ANION GAP SERPL CALC-SCNC: 8 MMOL/L (ref 2–12)
AST SERPL-CCNC: 15 U/L (ref 15–37)
BASOPHILS # BLD: 0.04 K/UL (ref 0–0.1)
BASOPHILS NFR BLD: 0.9 % (ref 0–1)
BILIRUB SERPL-MCNC: 0.3 MG/DL (ref 0.2–1)
BUN SERPL-MCNC: 13 MG/DL (ref 6–20)
BUN/CREAT SERPL: 19 (ref 12–20)
CALCIUM SERPL-MCNC: 8.9 MG/DL (ref 8.5–10.1)
CHLORIDE SERPL-SCNC: 105 MMOL/L (ref 97–108)
CO2 SERPL-SCNC: 26 MMOL/L (ref 21–32)
COMMENT:: NORMAL
CREAT SERPL-MCNC: 0.67 MG/DL (ref 0.55–1.02)
D DIMER PPP FEU-MCNC: <0.19 MG/L FEU (ref 0–0.65)
DIFFERENTIAL METHOD BLD: NORMAL
EOSINOPHIL # BLD: 0.09 K/UL (ref 0–0.4)
EOSINOPHIL NFR BLD: 2 % (ref 0–7)
ERYTHROCYTE [DISTWIDTH] IN BLOOD BY AUTOMATED COUNT: 12.6 % (ref 11.5–14.5)
GLOBULIN SER CALC-MCNC: 2.6 G/DL (ref 2–4)
GLUCOSE SERPL-MCNC: 95 MG/DL (ref 65–100)
HCT VFR BLD AUTO: 36.3 % (ref 35–47)
HGB BLD-MCNC: 12.3 G/DL (ref 11.5–16)
IMM GRANULOCYTES # BLD AUTO: 0 K/UL (ref 0–0.04)
IMM GRANULOCYTES NFR BLD AUTO: 0 % (ref 0–0.5)
LYMPHOCYTES # BLD: 1.8 K/UL (ref 0.8–3.5)
LYMPHOCYTES NFR BLD: 39.6 % (ref 12–49)
MCH RBC QN AUTO: 31 PG (ref 26–34)
MCHC RBC AUTO-ENTMCNC: 33.9 G/DL (ref 30–36.5)
MCV RBC AUTO: 91.4 FL (ref 80–99)
MONOCYTES # BLD: 0.4 K/UL (ref 0–1)
MONOCYTES NFR BLD: 8.8 % (ref 5–13)
NEUTS SEG # BLD: 2.22 K/UL (ref 1.8–8)
NEUTS SEG NFR BLD: 48.7 % (ref 32–75)
NRBC # BLD: 0 K/UL (ref 0–0.01)
NRBC BLD-RTO: 0 PER 100 WBC
PLATELET # BLD AUTO: 225 K/UL (ref 150–400)
PMV BLD AUTO: 9.9 FL (ref 8.9–12.9)
POTASSIUM SERPL-SCNC: 3.6 MMOL/L (ref 3.5–5.1)
PROT SERPL-MCNC: 6.4 G/DL (ref 6.4–8.2)
RBC # BLD AUTO: 3.97 M/UL (ref 3.8–5.2)
SODIUM SERPL-SCNC: 139 MMOL/L (ref 136–145)
SPECIMEN HOLD: NORMAL
TROPONIN I SERPL HS-MCNC: 7 NG/L (ref 0–51)
WBC # BLD AUTO: 4.6 K/UL (ref 3.6–11)

## 2025-03-16 PROCEDURE — 99284 EMERGENCY DEPT VISIT MOD MDM: CPT

## 2025-03-16 PROCEDURE — 36415 COLL VENOUS BLD VENIPUNCTURE: CPT

## 2025-03-16 PROCEDURE — 84484 ASSAY OF TROPONIN QUANT: CPT

## 2025-03-16 PROCEDURE — 93005 ELECTROCARDIOGRAM TRACING: CPT | Performed by: EMERGENCY MEDICINE

## 2025-03-16 PROCEDURE — 85025 COMPLETE CBC W/AUTO DIFF WBC: CPT

## 2025-03-16 PROCEDURE — 6370000000 HC RX 637 (ALT 250 FOR IP): Performed by: EMERGENCY MEDICINE

## 2025-03-16 PROCEDURE — 85379 FIBRIN DEGRADATION QUANT: CPT

## 2025-03-16 PROCEDURE — 80053 COMPREHEN METABOLIC PANEL: CPT

## 2025-03-16 RX ORDER — METOPROLOL TARTRATE 25 MG/1
TABLET, FILM COATED ORAL
COMMUNITY
Start: 2025-01-22

## 2025-03-16 RX ORDER — HYDROXYZINE HYDROCHLORIDE 25 MG/1
25 TABLET, FILM COATED ORAL
Status: COMPLETED | OUTPATIENT
Start: 2025-03-16 | End: 2025-03-16

## 2025-03-16 RX ORDER — ASPIRIN 81 MG/1
81 TABLET, CHEWABLE ORAL DAILY
COMMUNITY

## 2025-03-16 RX ADMIN — HYDROXYZINE HYDROCHLORIDE 25 MG: 25 TABLET, FILM COATED ORAL at 20:22

## 2025-03-16 ASSESSMENT — PAIN SCALES - GENERAL
PAINLEVEL_OUTOF10: 0
PAINLEVEL_OUTOF10: 2

## 2025-03-16 ASSESSMENT — PAIN - FUNCTIONAL ASSESSMENT
PAIN_FUNCTIONAL_ASSESSMENT: ACTIVITIES ARE NOT PREVENTED
PAIN_FUNCTIONAL_ASSESSMENT: 0-10

## 2025-03-16 ASSESSMENT — PAIN DESCRIPTION - ORIENTATION: ORIENTATION: UPPER

## 2025-03-16 ASSESSMENT — PAIN DESCRIPTION - FREQUENCY: FREQUENCY: INTERMITTENT

## 2025-03-16 ASSESSMENT — PAIN DESCRIPTION - LOCATION: LOCATION: CHEST

## 2025-03-16 ASSESSMENT — PAIN DESCRIPTION - ONSET: ONSET: SUDDEN

## 2025-03-16 ASSESSMENT — PAIN DESCRIPTION - PAIN TYPE: TYPE: ACUTE PAIN

## 2025-03-16 ASSESSMENT — PAIN DESCRIPTION - DESCRIPTORS: DESCRIPTORS: TIGHTNESS

## 2025-03-16 NOTE — ED TRIAGE NOTES
Patient arrives to treatment room, with chest pain that today began at 1630, with some lightheadedness. She also states she had an episode of similar symptoms late last night, and took medication for anxiety, and after a couple of hours her symptoms eased.

## 2025-03-17 NOTE — ED PROVIDER NOTES
[facility]  EMERGENCY DEPARTMENT ENCOUNTER      Pt Name: Rosalind Judd  MRN: [id number]  Birthdate 1965  Date of evaluation: 3/16/2025  Provider: Luis Kim MD    CHIEF COMPLAINT       Chief Complaint   Patient presents with    Chest Pain         HISTORY OF PRESENT ILLNESS   (Location/Symptom, Timing/Onset, Context/Setting, Quality, Duration, Modifying Factors, Severity)  Note limiting factors.   59-year-old female presents from home via private vehicle with complaint chest pain.  States symptoms initially started last night but she is going to bed.  She reports some chest heaviness and discomfort.  She took her antianxiety medication and was able to fall asleep around 2:00.  She felt fine for the first half of the day today.  Then around 430 she started having the chest heaviness again.  Denies any cough, fever, shortness of breath.  States she does have a history of chest pain associated with her anxiety disorder but no known history of coronary artery disease.  She took an aspirin tonight before.  Consenting to the emergency department.  States symptoms have largely resolved at this point.    The history is provided by the patient and medical records.         Review of External Medical Records:     Nursing Notes were reviewed.    REVIEW OF SYSTEMS    (2-9 systems for level 4, 10 or more for level 5)     Review of Systems   Constitutional:  Negative for fatigue.   HENT:  Negative for sore throat.    Eyes:  Negative for visual disturbance.   Respiratory:  Negative for cough.    Cardiovascular:  Negative for palpitations.   Gastrointestinal:  Negative for vomiting.   Genitourinary:  Negative for difficulty urinating.   Musculoskeletal:  Negative for myalgias.   Skin:  Negative for rash.   Neurological:  Negative for weakness.       Except as noted above the remainder of the review of systems was reviewed and negative.       PAST MEDICAL HISTORY     Past Medical History:  dissection, infectious etiology.  Symptoms have resolved after taking hydroxyzine.  I do think this was likely anxiety related.  Patient stable for discharge home to follow-up with her primary care doctor.  Vies to return to the ER if she has any recurrence or new and concerning symptoms.    Amount and/or Complexity of Data Reviewed  Labs: ordered.    Risk  Prescription drug management.            REASSESSMENT     ED Course as of 03/16/25 2101   Sun Mar 16, 2025   2000 ED ECG interpretation:  Rhythm: normal sinus rhythm. Rate (approx.): 58.  Axis: normal.  ST segment:  No concerning ST elevations or depressions. This EKG was independently interpreted by Luis Kim MD,ED Provider.   [JM]      ED Course User Index  [JM] Luis Kim MD           CONSULTS:  None    PROCEDURES:  Unless otherwise noted below, none     Procedures      FINAL IMPRESSION      1. Chest pain, unspecified type          DISPOSITION/PLAN   DISPOSITION Decision To Discharge 03/16/2025 09:00:39 PM      PATIENT REFERRED TO:  Evangelina Rodriguez MD  [address] [facility] [address] [phone]    Schedule an appointment as soon as possible for a visit   As needed    [facility]  [address] [place], [place] [address]y [facility] [address]  [facility] [address] [phone]    If symptoms worsen      DISCHARGE MEDICATIONS:  New Prescriptions    No medications on file         (Please note that portions of this note were completed with a voice recognition program.  Efforts were made to edit the dictations but occasionally words are mis-transcribed.)    Luis Kim MD (electronically signed)  Emergency Attending Physician / Physician Assistant / Nurse Practitioner             Luis Kim MD  03/16/25 2101

## 2025-03-17 NOTE — ED NOTES
Patient medicated per providers orders, tolerated well. Resting on stretcher with family member present at bedside. Call bell within reach, and lights dimmed for comfort.

## 2025-03-18 LAB
EKG ATRIAL RATE: 58 BPM
EKG DIAGNOSIS: NORMAL
EKG P AXIS: 37 DEGREES
EKG P-R INTERVAL: 142 MS
EKG Q-T INTERVAL: 404 MS
EKG QRS DURATION: 88 MS
EKG QTC CALCULATION (BAZETT): 396 MS
EKG R AXIS: 79 DEGREES
EKG T AXIS: 66 DEGREES
EKG VENTRICULAR RATE: 58 BPM

## 2025-03-18 PROCEDURE — 93010 ELECTROCARDIOGRAM REPORT: CPT | Performed by: INTERNAL MEDICINE

## 2025-04-09 ENCOUNTER — TELEPHONE (OUTPATIENT)
Age: 60
End: 2025-04-09

## 2025-04-14 ENCOUNTER — TELEPHONE (OUTPATIENT)
Age: 60
End: 2025-04-14

## 2025-04-14 NOTE — TELEPHONE ENCOUNTER
Drug Acquisition: PHARMACY- [facility]  POS [address] Drug: BOTOX 200 units Dx: G43.709  Insurance: Deniz VILLAFUERTE Case ID: [id number]  HCP:   CPT: 38830, No PA required  Approval Range: 05/24/24-05/24/25 04/02/25: 2025 benefits sent, located pharmacy approval letter in media, HH is .

## 2025-04-15 ENCOUNTER — TELEPHONE (OUTPATIENT)
Age: 60
End: 2025-04-15

## 2025-04-18 ENCOUNTER — OFFICE VISIT (OUTPATIENT)
Age: 60
End: 2025-04-18

## 2025-04-18 DIAGNOSIS — G43.709 CHRONIC MIGRAINE WITHOUT AURA, NOT INTRACTABLE, WITHOUT STATUS MIGRAINOSUS: Primary | ICD-10-CM

## 2025-04-18 NOTE — PROGRESS NOTES
OFFICE PROCEDURE PROGRESS NOTE      Chief Complaint   Patient presents with    Botox Injection     Patient states they are having  1   migraines per month, and patient has    99% in reduction of migraines since the start of botox.               Chart reviewed for the following:   Elvin JIANG APRN - NP, have reviewed the History, Physical and updated the Allergic reactions for Rosalind Judd     TIME OUT performed immediately prior to start of procedure:   Elvin JIANG APRN - NP, have performed the following reviews on Rosalind Judd prior to the start of the procedure:            * Patient was identified by name and date of birth   * Agreement on procedure being performed was verified  * Risks and Benefits explained to the patient  * Procedure site verified and marked as necessary  * Patient was positioned for comfort  * Consent was signed and verified     Time: 1100      Date of procedure: 4/18/2025    Procedure performed by:  JULIO Gandara NP    Provider assisted by: None    Patient assisted by: None    How tolerated by patient: tolerated the procedure well with no complications    Post Procedural Pain Scale: 2 - Hurts Little Bit    Comments: None    LOT:[id number]  EXP: 4/2027          Botox Injection Note       Indication: patient has chronic recurrent migraine, has 7-10 less migraine days per month with botox injections    Procedure:   Botox concentration: 200 units in 4 ml of preservative-free normal saline.   31 sites injections, distribution as follow      Units/site  Sites Sides Subtotal    Procerus 5 1 1 5    5 1 2 10   Frontalis 5 2 2 20   Temporalis 5 4 2 40   Occipitalis 5 3 2 30   Upper cervical paraspinalis 5 2 2 20   Trapezius 5 3 2 30         200 units Botox were reconstituted, 155 units injected as above and the remainder was unavoidably wasted.     Patient tolerated procedure well.     _____________________________   ELVIN BADILLO

## 2025-05-27 ENCOUNTER — TELEPHONE (OUTPATIENT)
Age: 60
End: 2025-05-27

## 2025-05-27 NOTE — TELEPHONE ENCOUNTER
Patient states that she is having chest pains (discomfort) again. She went to ED on 3/16. She said the pain started coming back yesterday. I did advise her to go to ED or UC if pain worsens or radiates down arm. She did     Thank you!

## 2025-05-29 ENCOUNTER — HOSPITAL ENCOUNTER (OUTPATIENT)
Facility: HOSPITAL | Age: 60
Setting detail: OBSERVATION
Discharge: HOME | End: 2025-05-30
Attending: EMERGENCY MEDICINE | Admitting: FAMILY MEDICINE
Payer: COMMERCIAL

## 2025-05-29 ENCOUNTER — APPOINTMENT (OUTPATIENT)
Facility: HOSPITAL | Age: 60
End: 2025-05-29
Payer: COMMERCIAL

## 2025-05-29 ENCOUNTER — OFFICE VISIT (OUTPATIENT)
Age: 60
End: 2025-05-29
Payer: COMMERCIAL

## 2025-05-29 VITALS
SYSTOLIC BLOOD PRESSURE: 132 MMHG | OXYGEN SATURATION: 98 % | DIASTOLIC BLOOD PRESSURE: 74 MMHG | TEMPERATURE: 98.2 F | HEART RATE: 78 BPM | WEIGHT: 132.8 LBS | RESPIRATION RATE: 16 BRPM | BODY MASS INDEX: 20.84 KG/M2 | HEIGHT: 67 IN

## 2025-05-29 DIAGNOSIS — R53.81 MALAISE AND FATIGUE: ICD-10-CM

## 2025-05-29 DIAGNOSIS — Z13.1 SCREENING FOR DIABETES MELLITUS: ICD-10-CM

## 2025-05-29 DIAGNOSIS — R53.83 MALAISE AND FATIGUE: ICD-10-CM

## 2025-05-29 DIAGNOSIS — E78.00 PURE HYPERCHOLESTEROLEMIA: ICD-10-CM

## 2025-05-29 DIAGNOSIS — R93.1 ABNORMAL CT SCAN OF HEART: ICD-10-CM

## 2025-05-29 DIAGNOSIS — R07.9 CHEST PAIN, UNSPECIFIED TYPE: Primary | ICD-10-CM

## 2025-05-29 DIAGNOSIS — I20.9 CARDIAC ANGINA: ICD-10-CM

## 2025-05-29 DIAGNOSIS — E55.9 VITAMIN D DEFICIENCY: ICD-10-CM

## 2025-05-29 DIAGNOSIS — R07.9 INTERMITTENT CHEST PAIN: Primary | ICD-10-CM

## 2025-05-29 LAB
ALBUMIN SERPL-MCNC: 4.3 G/DL (ref 3.5–5)
ALBUMIN/GLOB SERPL: 1.6 (ref 1.1–2.2)
ALP SERPL-CCNC: 83 U/L (ref 45–117)
ALT SERPL-CCNC: 23 U/L (ref 12–78)
ANION GAP SERPL CALC-SCNC: 9 MMOL/L (ref 2–12)
AST SERPL-CCNC: 15 U/L (ref 15–37)
BASOPHILS # BLD: 0.07 K/UL (ref 0–0.1)
BASOPHILS NFR BLD: 1.3 % (ref 0–1)
BILIRUB SERPL-MCNC: 0.4 MG/DL (ref 0.2–1)
BUN SERPL-MCNC: 13 MG/DL (ref 6–20)
BUN/CREAT SERPL: 18 (ref 12–20)
CALCIUM SERPL-MCNC: 9 MG/DL (ref 8.5–10.1)
CHLORIDE SERPL-SCNC: 106 MMOL/L (ref 97–108)
CO2 SERPL-SCNC: 27 MMOL/L (ref 21–32)
CREAT SERPL-MCNC: 0.74 MG/DL (ref 0.55–1.02)
DIFFERENTIAL METHOD BLD: ABNORMAL
EOSINOPHIL # BLD: 0.15 K/UL (ref 0–0.4)
EOSINOPHIL NFR BLD: 2.8 % (ref 0–7)
ERYTHROCYTE [DISTWIDTH] IN BLOOD BY AUTOMATED COUNT: 12.7 % (ref 11.5–14.5)
GLOBULIN SER CALC-MCNC: 2.7 G/DL (ref 2–4)
GLUCOSE SERPL-MCNC: 99 MG/DL (ref 65–100)
HCT VFR BLD AUTO: 37.7 % (ref 35–47)
HGB BLD-MCNC: 13 G/DL (ref 11.5–16)
IMM GRANULOCYTES # BLD AUTO: 0.01 K/UL (ref 0–0.04)
IMM GRANULOCYTES NFR BLD AUTO: 0.2 % (ref 0–0.5)
LIPASE SERPL-CCNC: 28 U/L (ref 13–75)
LYMPHOCYTES # BLD: 2.4 K/UL (ref 0.8–3.5)
LYMPHOCYTES NFR BLD: 44.6 % (ref 12–49)
MAGNESIUM SERPL-MCNC: 2.2 MG/DL (ref 1.6–2.4)
MCH RBC QN AUTO: 31.2 PG (ref 26–34)
MCHC RBC AUTO-ENTMCNC: 34.5 G/DL (ref 30–36.5)
MCV RBC AUTO: 90.4 FL (ref 80–99)
MONOCYTES # BLD: 0.47 K/UL (ref 0–1)
MONOCYTES NFR BLD: 8.7 % (ref 5–13)
NEUTS SEG # BLD: 2.28 K/UL (ref 1.8–8)
NEUTS SEG NFR BLD: 42.4 % (ref 32–75)
NRBC # BLD: 0 K/UL (ref 0–0.01)
NRBC BLD-RTO: 0 PER 100 WBC
PLATELET # BLD AUTO: 238 K/UL (ref 150–400)
PMV BLD AUTO: 9.7 FL (ref 8.9–12.9)
POTASSIUM SERPL-SCNC: 3.3 MMOL/L (ref 3.5–5.1)
PROT SERPL-MCNC: 7 G/DL (ref 6.4–8.2)
RBC # BLD AUTO: 4.17 M/UL (ref 3.8–5.2)
SODIUM SERPL-SCNC: 142 MMOL/L (ref 136–145)
TROPONIN I SERPL HS-MCNC: 4 NG/L (ref 0–51)
WBC # BLD AUTO: 5.4 K/UL (ref 3.6–11)

## 2025-05-29 PROCEDURE — 83690 ASSAY OF LIPASE: CPT

## 2025-05-29 PROCEDURE — 85025 COMPLETE CBC W/AUTO DIFF WBC: CPT

## 2025-05-29 PROCEDURE — 84484 ASSAY OF TROPONIN QUANT: CPT

## 2025-05-29 PROCEDURE — 83735 ASSAY OF MAGNESIUM: CPT

## 2025-05-29 PROCEDURE — 99285 EMERGENCY DEPT VISIT HI MDM: CPT

## 2025-05-29 PROCEDURE — 80053 COMPREHEN METABOLIC PANEL: CPT

## 2025-05-29 PROCEDURE — 36415 COLL VENOUS BLD VENIPUNCTURE: CPT

## 2025-05-29 PROCEDURE — 3075F SYST BP GE 130 - 139MM HG: CPT | Performed by: INTERNAL MEDICINE

## 2025-05-29 PROCEDURE — 71045 X-RAY EXAM CHEST 1 VIEW: CPT

## 2025-05-29 PROCEDURE — 3078F DIAST BP <80 MM HG: CPT | Performed by: INTERNAL MEDICINE

## 2025-05-29 PROCEDURE — 99213 OFFICE O/P EST LOW 20 MIN: CPT | Performed by: INTERNAL MEDICINE

## 2025-05-29 PROCEDURE — 93005 ELECTROCARDIOGRAM TRACING: CPT | Performed by: EMERGENCY MEDICINE

## 2025-05-29 PROCEDURE — 6370000000 HC RX 637 (ALT 250 FOR IP): Performed by: EMERGENCY MEDICINE

## 2025-05-29 RX ORDER — NITROGLYCERIN 0.4 MG/1
0.4 TABLET SUBLINGUAL EVERY 5 MIN PRN
Status: DISCONTINUED | OUTPATIENT
Start: 2025-05-29 | End: 2025-05-30 | Stop reason: HOSPADM

## 2025-05-29 RX ORDER — ASPIRIN 325 MG
325 TABLET ORAL
Status: DISCONTINUED | OUTPATIENT
Start: 2025-05-29 | End: 2025-05-29

## 2025-05-29 RX ORDER — MINOXIDIL 2.5 MG/1
2.5 TABLET ORAL DAILY
COMMUNITY
Start: 2025-04-30 | End: 2025-05-29

## 2025-05-29 RX ORDER — ASPIRIN 81 MG/1
162 TABLET, CHEWABLE ORAL ONCE
Status: COMPLETED | OUTPATIENT
Start: 2025-05-29 | End: 2025-05-29

## 2025-05-29 RX ADMIN — NITROGLYCERIN 0.4 MG: 0.4 TABLET SUBLINGUAL at 23:06

## 2025-05-29 RX ADMIN — ASPIRIN 162 MG: 81 TABLET, CHEWABLE ORAL at 23:14

## 2025-05-29 SDOH — ECONOMIC STABILITY: FOOD INSECURITY: WITHIN THE PAST 12 MONTHS, THE FOOD YOU BOUGHT JUST DIDN'T LAST AND YOU DIDN'T HAVE MONEY TO GET MORE.: NEVER TRUE

## 2025-05-29 SDOH — ECONOMIC STABILITY: FOOD INSECURITY: WITHIN THE PAST 12 MONTHS, YOU WORRIED THAT YOUR FOOD WOULD RUN OUT BEFORE YOU GOT MONEY TO BUY MORE.: NEVER TRUE

## 2025-05-29 ASSESSMENT — PATIENT HEALTH QUESTIONNAIRE - PHQ9
SUM OF ALL RESPONSES TO PHQ QUESTIONS 1-9: 0
9. THOUGHTS THAT YOU WOULD BE BETTER OFF DEAD, OR OF HURTING YOURSELF: NOT AT ALL
SUM OF ALL RESPONSES TO PHQ QUESTIONS 1-9: 0
8. MOVING OR SPEAKING SO SLOWLY THAT OTHER PEOPLE COULD HAVE NOTICED. OR THE OPPOSITE, BEING SO FIGETY OR RESTLESS THAT YOU HAVE BEEN MOVING AROUND A LOT MORE THAN USUAL: NOT AT ALL
3. TROUBLE FALLING OR STAYING ASLEEP: NOT AT ALL
7. TROUBLE CONCENTRATING ON THINGS, SUCH AS READING THE NEWSPAPER OR WATCHING TELEVISION: NOT AT ALL
2. FEELING DOWN, DEPRESSED OR HOPELESS: NOT AT ALL
10. IF YOU CHECKED OFF ANY PROBLEMS, HOW DIFFICULT HAVE THESE PROBLEMS MADE IT FOR YOU TO DO YOUR WORK, TAKE CARE OF THINGS AT HOME, OR GET ALONG WITH OTHER PEOPLE: NOT DIFFICULT AT ALL
4. FEELING TIRED OR HAVING LITTLE ENERGY: NOT AT ALL
SUM OF ALL RESPONSES TO PHQ QUESTIONS 1-9: 0
1. LITTLE INTEREST OR PLEASURE IN DOING THINGS: NOT AT ALL
SUM OF ALL RESPONSES TO PHQ QUESTIONS 1-9: 0
6. FEELING BAD ABOUT YOURSELF - OR THAT YOU ARE A FAILURE OR HAVE LET YOURSELF OR YOUR FAMILY DOWN: NOT AT ALL
5. POOR APPETITE OR OVEREATING: NOT AT ALL

## 2025-05-29 ASSESSMENT — PAIN SCALES - GENERAL
PAINLEVEL_OUTOF10: 4
PAINLEVEL_OUTOF10: 8
PAINLEVEL_OUTOF10: 1

## 2025-05-29 ASSESSMENT — PAIN DESCRIPTION - LOCATION
LOCATION: CHEST;NECK

## 2025-05-29 ASSESSMENT — PAIN DESCRIPTION - PAIN TYPE
TYPE: ACUTE PAIN
TYPE: ACUTE PAIN

## 2025-05-29 ASSESSMENT — PAIN DESCRIPTION - ORIENTATION
ORIENTATION: LEFT
ORIENTATION: MID
ORIENTATION: LEFT

## 2025-05-29 ASSESSMENT — PAIN DESCRIPTION - DESCRIPTORS
DESCRIPTORS: CRAMPING
DESCRIPTORS: CRAMPING

## 2025-05-29 ASSESSMENT — PAIN - FUNCTIONAL ASSESSMENT
PAIN_FUNCTIONAL_ASSESSMENT: ACTIVITIES ARE NOT PREVENTED
PAIN_FUNCTIONAL_ASSESSMENT: 0-10
PAIN_FUNCTIONAL_ASSESSMENT: ACTIVITIES ARE NOT PREVENTED

## 2025-05-29 ASSESSMENT — PAIN DESCRIPTION - FREQUENCY
FREQUENCY: CONTINUOUS
FREQUENCY: CONTINUOUS

## 2025-05-29 ASSESSMENT — ENCOUNTER SYMPTOMS: CHEST TIGHTNESS: 1

## 2025-05-29 NOTE — PROGRESS NOTES
[facility]   Acute Visit Progress Note  Patient: Rosalind Judd  1965, 59 y.o., female  Encounter Date: 5/29/25    CHIEF COMPLAINT:  Chief Complaint   Patient presents with    Follow-Up from Hospital     3/16  [facility] ER  Chest pain, unspecified type    Chest Pain     Pt said she did experience chest pain a few nights ago        ASSESSMENT & PLAN:      ICD-10-CM    1. Intermittent chest pain  R07.9 CBC with Auto Differential     Comprehensive Metabolic Panel     Lipid Panel     External Referral To Cardiology      2. Pure hypercholesterolemia  E78.00  Lipid Panel         3. Screening for diabetes mellitus  Z13.1 Hemoglobin A1C      4. Malaise and fatigue  R53.81 T4, Free    R53.83 TSH      5. Vitamin D deficiency  E55.9 Vitamin D 25 Hydroxy      6. Abnormal CT scan of heart  R93.1 External Referral To Cardiology        59F who presents for follow up of intermittent chest pain and work up in the ER 3/2025. She was then seen by cardiology and had a CT-calcium score >800.   Told to improve lifestyle, diet and monitor symptoms.   From what I can see, she is not on a statin and has not had recent lipid levels drawn.   She needs fasting labs and lipids.   Also, given her symptoms and findings, will refer to Cardiology for further evaluation.   I also reviewed anginal symptoms that should send her to the ER immediately.     I have discussed the diagnosis with the patient and the intended treatment plan as seen in the above orders. The patient has received an after-visit summary and questions were answered concerning future plans. Asked to return should symptoms worsen or not improve with treatment. Any pending labs and studies will be relayed to patient when they become available.     Pt verbalizes understanding of plan of care and denies further questions or concerns at this time    Return if symptoms worsen or fail to improve.    SUBJECTIVE  Rosalind Judd is a 59 y.o. female presenting today for 
       Fall Risk Assessment:  :          No data to display                 Abuse Screening:  :          No data to display                 Coordination of Care Questionnaire:  :     \"Have you been to the ER, urgent care clinic since your last visit?  Hospitalized since your last visit?\"    3/16  [facility] ER  Chest pain, unspecified type    “Have you seen or consulted any other health care providers outside our system since your last visit?”    NO      “Have you had a colorectal cancer screening such as a colonoscopy/FIT/Cologuard?    NO    No colonoscopy on file  No cologuard on file  No FIT/FOBT on file   No flexible sigmoidoscopy on file       Click Here for Release of Records Request

## 2025-05-30 ENCOUNTER — APPOINTMENT (OUTPATIENT)
Facility: HOSPITAL | Age: 60
End: 2025-05-30
Payer: COMMERCIAL

## 2025-05-30 VITALS
SYSTOLIC BLOOD PRESSURE: 111 MMHG | HEART RATE: 58 BPM | OXYGEN SATURATION: 97 % | TEMPERATURE: 97.3 F | HEIGHT: 67 IN | RESPIRATION RATE: 16 BRPM | BODY MASS INDEX: 21.19 KG/M2 | DIASTOLIC BLOOD PRESSURE: 77 MMHG | WEIGHT: 135 LBS

## 2025-05-30 PROBLEM — I20.9 ANGINA PECTORIS: Status: ACTIVE | Noted: 2025-05-30

## 2025-05-30 LAB
ANION GAP SERPL CALC-SCNC: 5 MMOL/L (ref 2–12)
BUN SERPL-MCNC: 11 MG/DL (ref 6–20)
BUN/CREAT SERPL: 19 (ref 12–20)
CALCIUM SERPL-MCNC: 8.9 MG/DL (ref 8.5–10.1)
CHLORIDE SERPL-SCNC: 111 MMOL/L (ref 97–108)
CO2 SERPL-SCNC: 25 MMOL/L (ref 21–32)
CREAT SERPL-MCNC: 0.59 MG/DL (ref 0.55–1.02)
ECHO AO ASC DIAM: 2.7 CM
ECHO AO ASCENDING AORTA INDEX: 1.58 CM/M2
ECHO AO ROOT DIAM: 2.3 CM
ECHO AO ROOT INDEX: 1.35 CM/M2
ECHO AV AREA PEAK VELOCITY: 2 CM2
ECHO AV AREA VTI: 1.9 CM2
ECHO AV AREA/BSA PEAK VELOCITY: 1.2 CM2/M2
ECHO AV AREA/BSA VTI: 1.1 CM2/M2
ECHO AV MEAN GRADIENT: 6 MMHG
ECHO AV MEAN VELOCITY: 1.2 M/S
ECHO AV PEAK GRADIENT: 10 MMHG
ECHO AV PEAK VELOCITY: 1.6 M/S
ECHO AV VELOCITY RATIO: 0.88
ECHO AV VTI: 36.5 CM
ECHO BSA: 1.7 M2
ECHO BSA: 1.7 M2
ECHO EST RA PRESSURE: 3 MMHG
ECHO LA DIAMETER INDEX: 1.4 CM/M2
ECHO LA DIAMETER: 2.4 CM
ECHO LA TO AORTIC ROOT RATIO: 1.04
ECHO LA VOL A-L A2C: 30 ML (ref 22–52)
ECHO LA VOL A-L A4C: 33 ML (ref 22–52)
ECHO LA VOL BP: 31 ML (ref 22–52)
ECHO LA VOL MOD A2C: 29 ML (ref 22–52)
ECHO LA VOL MOD A4C: 31 ML (ref 22–52)
ECHO LA VOL/BSA BIPLANE: 18 ML/M2 (ref 16–34)
ECHO LA VOLUME AREA LENGTH: 33 ML
ECHO LA VOLUME INDEX A-L A2C: 18 ML/M2 (ref 16–34)
ECHO LA VOLUME INDEX A-L A4C: 19 ML/M2 (ref 16–34)
ECHO LA VOLUME INDEX AREA LENGTH: 19 ML/M2 (ref 16–34)
ECHO LA VOLUME INDEX MOD A2C: 17 ML/M2 (ref 16–34)
ECHO LA VOLUME INDEX MOD A4C: 18 ML/M2 (ref 16–34)
ECHO LV E' LATERAL VELOCITY: 9.19 CM/S
ECHO LV E' SEPTAL VELOCITY: 7.23 CM/S
ECHO LV EDV A2C: 47 ML
ECHO LV EDV A4C: 37 ML
ECHO LV EDV BP: 43 ML (ref 56–104)
ECHO LV EDV INDEX A4C: 22 ML/M2
ECHO LV EDV INDEX BP: 25 ML/M2
ECHO LV EDV NDEX A2C: 27 ML/M2
ECHO LV EF PHYSICIAN: 60 %
ECHO LV EJECTION FRACTION A2C: 67 %
ECHO LV EJECTION FRACTION A4C: 60 %
ECHO LV EJECTION FRACTION BIPLANE: 62 % (ref 55–100)
ECHO LV ESV A2C: 16 ML
ECHO LV ESV A4C: 15 ML
ECHO LV ESV BP: 16 ML (ref 19–49)
ECHO LV ESV INDEX A2C: 9 ML/M2
ECHO LV ESV INDEX A4C: 9 ML/M2
ECHO LV ESV INDEX BP: 9 ML/M2
ECHO LV FRACTIONAL SHORTENING: 24 % (ref 28–44)
ECHO LV INTERNAL DIMENSION DIASTOLE INDEX: 2.46 CM/M2
ECHO LV INTERNAL DIMENSION DIASTOLIC: 4.2 CM (ref 3.9–5.3)
ECHO LV INTERNAL DIMENSION SYSTOLIC INDEX: 1.87 CM/M2
ECHO LV INTERNAL DIMENSION SYSTOLIC: 3.2 CM
ECHO LV IVSD: 0.6 CM (ref 0.6–0.9)
ECHO LV MASS 2D: 77.4 G (ref 67–162)
ECHO LV MASS INDEX 2D: 45.3 G/M2 (ref 43–95)
ECHO LV POSTERIOR WALL DIASTOLIC: 0.7 CM (ref 0.6–0.9)
ECHO LV RELATIVE WALL THICKNESS RATIO: 0.33
ECHO LVOT AREA: 2.3 CM2
ECHO LVOT AV VTI INDEX: 0.82
ECHO LVOT DIAM: 1.7 CM
ECHO LVOT MEAN GRADIENT: 4 MMHG
ECHO LVOT PEAK GRADIENT: 8 MMHG
ECHO LVOT PEAK VELOCITY: 1.4 M/S
ECHO LVOT STROKE VOLUME INDEX: 39.9 ML/M2
ECHO LVOT SV: 68.3 ML
ECHO LVOT VTI: 30.1 CM
ECHO MV A VELOCITY: 0.81 M/S
ECHO MV E DECELERATION TIME (DT): 233.6 MS
ECHO MV E VELOCITY: 0.68 M/S
ECHO MV E/A RATIO: 0.84
ECHO MV E/E' LATERAL: 7.4
ECHO MV E/E' RATIO (AVERAGED): 8.4
ECHO MV E/E' SEPTAL: 9.41
ECHO PV MAX VELOCITY: 0.9 M/S
ECHO PV PEAK GRADIENT: 3 MMHG
ECHO RIGHT VENTRICULAR SYSTOLIC PRESSURE (RVSP): 24 MMHG
ECHO RV FREE WALL PEAK S': 10.9 CM/S
ECHO RV INTERNAL DIMENSION: 3.1 CM
ECHO RV TAPSE: 2.3 CM (ref 1.7–?)
ECHO RVOT MEAN GRADIENT: 0 MMHG
ECHO RVOT PEAK GRADIENT: 1 MMHG
ECHO RVOT PEAK VELOCITY: 0.5 M/S
ECHO RVOT VTI: 11.7 CM
ECHO TV REGURGITANT MAX VELOCITY: 2.31 M/S
ECHO TV REGURGITANT PEAK GRADIENT: 21 MMHG
EKG ATRIAL RATE: 61 BPM
EKG ATRIAL RATE: 73 BPM
EKG DIAGNOSIS: NORMAL
EKG DIAGNOSIS: NORMAL
EKG P AXIS: 57 DEGREES
EKG P AXIS: 76 DEGREES
EKG P-R INTERVAL: 160 MS
EKG P-R INTERVAL: 162 MS
EKG Q-T INTERVAL: 378 MS
EKG Q-T INTERVAL: 416 MS
EKG QRS DURATION: 86 MS
EKG QRS DURATION: 86 MS
EKG QTC CALCULATION (BAZETT): 416 MS
EKG QTC CALCULATION (BAZETT): 418 MS
EKG R AXIS: 62 DEGREES
EKG R AXIS: 68 DEGREES
EKG T AXIS: 38 DEGREES
EKG T AXIS: 53 DEGREES
EKG VENTRICULAR RATE: 61 BPM
EKG VENTRICULAR RATE: 73 BPM
GLUCOSE SERPL-MCNC: 95 MG/DL (ref 65–100)
POTASSIUM SERPL-SCNC: 3.6 MMOL/L (ref 3.5–5.1)
SODIUM SERPL-SCNC: 141 MMOL/L (ref 136–145)
TROPONIN I SERPL HS-MCNC: 5 NG/L (ref 0–51)

## 2025-05-30 PROCEDURE — 99152 MOD SED SAME PHYS/QHP 5/>YRS: CPT | Performed by: STUDENT IN AN ORGANIZED HEALTH CARE EDUCATION/TRAINING PROGRAM

## 2025-05-30 PROCEDURE — 2709999900 HC NON-CHARGEABLE SUPPLY: Performed by: STUDENT IN AN ORGANIZED HEALTH CARE EDUCATION/TRAINING PROGRAM

## 2025-05-30 PROCEDURE — 76937 US GUIDE VASCULAR ACCESS: CPT | Performed by: STUDENT IN AN ORGANIZED HEALTH CARE EDUCATION/TRAINING PROGRAM

## 2025-05-30 PROCEDURE — 99235 HOSP IP/OBS SAME DATE MOD 70: CPT | Performed by: FAMILY MEDICINE

## 2025-05-30 PROCEDURE — APPSS30 APP SPLIT SHARED TIME 16-30 MINUTES: Performed by: NURSE PRACTITIONER

## 2025-05-30 PROCEDURE — G0378 HOSPITAL OBSERVATION PER HR: HCPCS

## 2025-05-30 PROCEDURE — 2580000003 HC RX 258: Performed by: STUDENT IN AN ORGANIZED HEALTH CARE EDUCATION/TRAINING PROGRAM

## 2025-05-30 PROCEDURE — 36415 COLL VENOUS BLD VENIPUNCTURE: CPT

## 2025-05-30 PROCEDURE — 93452 LEFT HRT CATH W/VENTRCLGRPHY: CPT | Performed by: STUDENT IN AN ORGANIZED HEALTH CARE EDUCATION/TRAINING PROGRAM

## 2025-05-30 PROCEDURE — 93458 L HRT ARTERY/VENTRICLE ANGIO: CPT | Performed by: STUDENT IN AN ORGANIZED HEALTH CARE EDUCATION/TRAINING PROGRAM

## 2025-05-30 PROCEDURE — 6370000000 HC RX 637 (ALT 250 FOR IP)

## 2025-05-30 PROCEDURE — 94761 N-INVAS EAR/PLS OXIMETRY MLT: CPT

## 2025-05-30 PROCEDURE — 93010 ELECTROCARDIOGRAM REPORT: CPT | Performed by: INTERNAL MEDICINE

## 2025-05-30 PROCEDURE — 93306 TTE W/DOPPLER COMPLETE: CPT

## 2025-05-30 PROCEDURE — 93306 TTE W/DOPPLER COMPLETE: CPT | Performed by: INTERNAL MEDICINE

## 2025-05-30 PROCEDURE — 99223 1ST HOSP IP/OBS HIGH 75: CPT | Performed by: INTERNAL MEDICINE

## 2025-05-30 PROCEDURE — C1894 INTRO/SHEATH, NON-LASER: HCPCS | Performed by: STUDENT IN AN ORGANIZED HEALTH CARE EDUCATION/TRAINING PROGRAM

## 2025-05-30 PROCEDURE — C1769 GUIDE WIRE: HCPCS | Performed by: STUDENT IN AN ORGANIZED HEALTH CARE EDUCATION/TRAINING PROGRAM

## 2025-05-30 PROCEDURE — 84484 ASSAY OF TROPONIN QUANT: CPT

## 2025-05-30 PROCEDURE — 6360000002 HC RX W HCPCS: Performed by: STUDENT IN AN ORGANIZED HEALTH CARE EDUCATION/TRAINING PROGRAM

## 2025-05-30 PROCEDURE — 6360000004 HC RX CONTRAST MEDICATION: Performed by: STUDENT IN AN ORGANIZED HEALTH CARE EDUCATION/TRAINING PROGRAM

## 2025-05-30 PROCEDURE — 80048 BASIC METABOLIC PNL TOTAL CA: CPT

## 2025-05-30 PROCEDURE — 2500000003 HC RX 250 WO HCPCS: Performed by: STUDENT IN AN ORGANIZED HEALTH CARE EDUCATION/TRAINING PROGRAM

## 2025-05-30 PROCEDURE — 2500000003 HC RX 250 WO HCPCS

## 2025-05-30 RX ORDER — ESTRADIOL 10 UG/1
10 TABLET, FILM COATED VAGINAL
Status: DISCONTINUED | OUTPATIENT
Start: 2025-06-02 | End: 2025-05-30 | Stop reason: HOSPADM

## 2025-05-30 RX ORDER — FENTANYL CITRATE 50 UG/ML
INJECTION, SOLUTION INTRAMUSCULAR; INTRAVENOUS PRN
Status: DISCONTINUED | OUTPATIENT
Start: 2025-05-30 | End: 2025-05-30 | Stop reason: HOSPADM

## 2025-05-30 RX ORDER — ONDANSETRON 4 MG/1
4 TABLET, ORALLY DISINTEGRATING ORAL EVERY 8 HOURS PRN
Status: DISCONTINUED | OUTPATIENT
Start: 2025-05-30 | End: 2025-05-30 | Stop reason: HOSPADM

## 2025-05-30 RX ORDER — BUTALBITAL, ACETAMINOPHEN AND CAFFEINE 50; 325; 40 MG/1; MG/1; MG/1
1 TABLET ORAL EVERY 6 HOURS PRN
Status: DISCONTINUED | OUTPATIENT
Start: 2025-05-30 | End: 2025-05-30 | Stop reason: HOSPADM

## 2025-05-30 RX ORDER — SODIUM CHLORIDE 9 MG/ML
INJECTION, SOLUTION INTRAVENOUS PRN
Status: DISCONTINUED | OUTPATIENT
Start: 2025-05-30 | End: 2025-05-30 | Stop reason: HOSPADM

## 2025-05-30 RX ORDER — SODIUM CHLORIDE 0.9 % (FLUSH) 0.9 %
5-40 SYRINGE (ML) INJECTION PRN
Status: DISCONTINUED | OUTPATIENT
Start: 2025-05-30 | End: 2025-05-30 | Stop reason: HOSPADM

## 2025-05-30 RX ORDER — HEPARIN SODIUM 1000 [USP'U]/ML
INJECTION, SOLUTION INTRAVENOUS; SUBCUTANEOUS PRN
Status: DISCONTINUED | OUTPATIENT
Start: 2025-05-30 | End: 2025-05-30 | Stop reason: HOSPADM

## 2025-05-30 RX ORDER — ACETAMINOPHEN 650 MG/1
650 SUPPOSITORY RECTAL EVERY 6 HOURS PRN
Status: DISCONTINUED | OUTPATIENT
Start: 2025-05-30 | End: 2025-05-30 | Stop reason: HOSPADM

## 2025-05-30 RX ORDER — SODIUM CHLORIDE 0.9 % (FLUSH) 0.9 %
5-40 SYRINGE (ML) INJECTION EVERY 12 HOURS SCHEDULED
Status: DISCONTINUED | OUTPATIENT
Start: 2025-05-30 | End: 2025-05-30 | Stop reason: HOSPADM

## 2025-05-30 RX ORDER — POLYETHYLENE GLYCOL 3350 17 G/17G
17 POWDER, FOR SOLUTION ORAL DAILY PRN
Status: DISCONTINUED | OUTPATIENT
Start: 2025-05-30 | End: 2025-05-30 | Stop reason: HOSPADM

## 2025-05-30 RX ORDER — BUSPIRONE HYDROCHLORIDE 5 MG/1
7.5 TABLET ORAL 2 TIMES DAILY
Status: DISCONTINUED | OUTPATIENT
Start: 2025-05-30 | End: 2025-05-30 | Stop reason: HOSPADM

## 2025-05-30 RX ORDER — MIDAZOLAM HYDROCHLORIDE 1 MG/ML
INJECTION, SOLUTION INTRAMUSCULAR; INTRAVENOUS PRN
Status: DISCONTINUED | OUTPATIENT
Start: 2025-05-30 | End: 2025-05-30 | Stop reason: HOSPADM

## 2025-05-30 RX ORDER — ATORVASTATIN CALCIUM 40 MG/1
40 TABLET, FILM COATED ORAL NIGHTLY
Qty: 30 TABLET | Refills: 3 | Status: SHIPPED | OUTPATIENT
Start: 2025-05-30

## 2025-05-30 RX ORDER — ONDANSETRON 2 MG/ML
4 INJECTION INTRAMUSCULAR; INTRAVENOUS EVERY 6 HOURS PRN
Status: DISCONTINUED | OUTPATIENT
Start: 2025-05-30 | End: 2025-05-30 | Stop reason: HOSPADM

## 2025-05-30 RX ORDER — VERAPAMIL HYDROCHLORIDE 2.5 MG/ML
INJECTION INTRAVENOUS PRN
Status: DISCONTINUED | OUTPATIENT
Start: 2025-05-30 | End: 2025-05-30 | Stop reason: HOSPADM

## 2025-05-30 RX ORDER — HEPARIN SODIUM 200 [USP'U]/100ML
INJECTION, SOLUTION INTRAVENOUS PRN
Status: DISCONTINUED | OUTPATIENT
Start: 2025-05-30 | End: 2025-05-30 | Stop reason: HOSPADM

## 2025-05-30 RX ORDER — 0.9 % SODIUM CHLORIDE 0.9 %
INTRAVENOUS SOLUTION INTRAVENOUS CONTINUOUS PRN
Status: COMPLETED | OUTPATIENT
Start: 2025-05-30 | End: 2025-05-30

## 2025-05-30 RX ORDER — ATORVASTATIN CALCIUM 20 MG/1
40 TABLET, FILM COATED ORAL NIGHTLY
Status: DISCONTINUED | OUTPATIENT
Start: 2025-05-30 | End: 2025-05-30 | Stop reason: HOSPADM

## 2025-05-30 RX ORDER — ACETAMINOPHEN 325 MG/1
650 TABLET ORAL EVERY 6 HOURS PRN
Status: DISCONTINUED | OUTPATIENT
Start: 2025-05-30 | End: 2025-05-30 | Stop reason: HOSPADM

## 2025-05-30 RX ORDER — ASPIRIN 81 MG/1
81 TABLET, CHEWABLE ORAL DAILY
Status: DISCONTINUED | OUTPATIENT
Start: 2025-05-30 | End: 2025-05-30 | Stop reason: HOSPADM

## 2025-05-30 RX ORDER — ACETAMINOPHEN 325 MG/1
650 TABLET ORAL EVERY 4 HOURS PRN
Status: DISCONTINUED | OUTPATIENT
Start: 2025-05-30 | End: 2025-05-30 | Stop reason: HOSPADM

## 2025-05-30 RX ORDER — PANTOPRAZOLE SODIUM 40 MG/1
40 TABLET, DELAYED RELEASE ORAL
Status: DISCONTINUED | OUTPATIENT
Start: 2025-05-30 | End: 2025-05-30 | Stop reason: HOSPADM

## 2025-05-30 RX ORDER — LIDOCAINE HYDROCHLORIDE 10 MG/ML
INJECTION, SOLUTION INFILTRATION; PERINEURAL PRN
Status: DISCONTINUED | OUTPATIENT
Start: 2025-05-30 | End: 2025-05-30 | Stop reason: HOSPADM

## 2025-05-30 RX ORDER — IOPAMIDOL 755 MG/ML
INJECTION, SOLUTION INTRAVASCULAR PRN
Status: DISCONTINUED | OUTPATIENT
Start: 2025-05-30 | End: 2025-05-30 | Stop reason: HOSPADM

## 2025-05-30 RX ADMIN — ASPIRIN 81 MG: 81 TABLET, CHEWABLE ORAL at 08:12

## 2025-05-30 RX ADMIN — BUSPIRONE HYDROCHLORIDE 7.5 MG: 5 TABLET ORAL at 08:12

## 2025-05-30 RX ADMIN — PANTOPRAZOLE SODIUM 40 MG: 40 TABLET, DELAYED RELEASE ORAL at 06:48

## 2025-05-30 RX ADMIN — SODIUM CHLORIDE, PRESERVATIVE FREE 10 ML: 5 INJECTION INTRAVENOUS at 08:12

## 2025-05-30 ASSESSMENT — PAIN DESCRIPTION - DESCRIPTORS: DESCRIPTORS: ACHING;CRAMPING

## 2025-05-30 ASSESSMENT — PAIN SCALES - GENERAL
PAINLEVEL_OUTOF10: 0
PAINLEVEL_OUTOF10: 1

## 2025-05-30 ASSESSMENT — PAIN DESCRIPTION - LOCATION: LOCATION: CHEST;NECK

## 2025-05-30 ASSESSMENT — ENCOUNTER SYMPTOMS
COUGH: 0
SHORTNESS OF BREATH: 0

## 2025-05-30 ASSESSMENT — PAIN DESCRIPTION - ORIENTATION: ORIENTATION: MID;LEFT

## 2025-05-30 NOTE — ED PROVIDER NOTES
[facility] DEPARTMENT  EMERGENCY DEPARTMENT ENCOUNTER      Patient Name: Rosalind Judd  MRN: [id number]  Birthdate 1965  Date of Evaluation: 5/29/2025  Physician: Demetris Flores MD    CHIEF COMPLAINT       Chief Complaint   Patient presents with    Chest Pain       HISTORY OF PRESENT ILLNESS   (Location/Symptom, Timing/Onset, Context/Setting, Quality, Duration, Modifying Factors, Severity)   Rosalind Judd, 59 y.o., female     59-year-old female with a history of peptic ulcer disease, GERD presents with a chief complaint of chest pain.  Patient reports she developed left-sided chest pain which she describes as cramping in her chest around 8 PM this evening.  She denies associated nausea, shortness of breath or other symptoms.  She denies family history of early cardiovascular disease.  She denies history of hypertension, diabetes, hypercholesterolemia or smoking.  She reports having a calcium score in February which was high.  She has not yet followed up with cardiology.          Nursing Notes were reviewed.    REVIEW OF SYSTEMS    (Not required)   Review of Systems    Except as noted above the remainder of the review of systems was reviewed and negative.     PAST MEDICAL HISTORY     Past Medical History:   Diagnosis Date    Anxiety     Arthritis     Cancer (HCC)     BASAL CELL SKIN CANCER ON CHEST    Chronic neck pain 6/27/2015    Fatigue     GERD (gastroesophageal reflux disease) 3/12/2014    Headache     Hiatal hernia     Injury of elbow, left 1/2/2015    Panic disorder     PUD (peptic ulcer disease)     Swelling of joint, elbow, left 1/2/2015    TIA (transient ischemic attack) 6/27/2015       SURGICAL HISTORY       Past Surgical History:   Procedure Laterality Date    GYN      BTL    HEENT      WISDOM TOOTH EXTRACTION         CURRENT MEDICATIONS       Previous Medications    ASPIRIN 81 MG CHEWABLE TABLET    Take 1 tablet by mouth daily    BIOTIN 2.5 MG CAPS    Take 2,500 mcg by mouth

## 2025-05-30 NOTE — PROGRESS NOTES
2:46 PM  2 ml air released from TR Band. No bleeding or hematoma noted. Radial and Ulnar pulse on right wrist palpable. Pt tolerated well. Will continue to monitor.    2:49 PM  2 ml air released from TR Band. No bleeding or hematoma noted. Radial and Ulnar pulse on right wrist palpable. Pt tolerated well. Will continue to monitor.    2:53 PM  2 ml air released from TR Band. No bleeding or hematoma noted. Radial and Ulnar pulse on right wrist palpable. Pt tolerated well. Will continue to monitor.    2:57 PM  3 ml air released from TR Band. No bleeding or hematoma noted. Radial and Ulnar pulse on right wrist palpable. Pt tolerated well. Will continue to monitor.    2:59 PM  2 ml air released from TR Band. No bleeding or hematoma noted. Radial and Ulnar pulse on right wrist palpable. Pt tolerated well. Will continue to monitor.    3:00 PM  Air release completed. TR Band removed from right wrist. No bleeding or  Hematoma. Dressing applied. Wrist immobilizer in place. Radial and ulnar pulse remain palpable on affected extremity. Pt tolerated well. Instructions given to pt regarding movement and activity restrictions. Pt voiced understanding.    3:30 PM  Report given to floor RN, site check performed. Site presents as clean dry intact and soft to palpitation.

## 2025-05-30 NOTE — PROGRESS NOTES
Cath reviewed - no obstructive CAD.  Echo normal LV function.  Continue medical management - ASA, atorva 40 goal LDL <70.     Will sign off - call with questions.

## 2025-05-30 NOTE — ED NOTES
TRANSFER - OUT REPORT:    Verbal report given to Silvana on Rosalind Judd  being transferred to [facility] for routine progression of patient care       Report consisted of patient's Situation, Background, Assessment and   Recommendations(SBAR).     Information from the following report(s) Nurse Handoff Report was reviewed with the receiving nurse.    [place] Fall Assessment:    Presents to emergency department  because of falls (Syncope, seizure, or loss of consciousness): No  Age > 70: No  Altered Mental Status, Intoxication with alcohol or substance confusion (Disorientation, impaired judgment, poor safety awaremess, or inability to follow instructions): No  Impaired Mobility: Ambulates or transfers with assistive devices or assistance; Unable to ambulate or transer.: No  Nursing Judgement: No          Lines:       Opportunity for questions and clarification was provided.      Patient transported with:  Monitor

## 2025-05-30 NOTE — DISCHARGE SUMMARY
[address] [id number]   Office [phone]  Fax [phone]       Discharge / Transfer / Off-Service Note     Name: Rosalind Judd MRN: [id number]  Sex: Female   YOB: 1965  Age: 59 y.o.  PCP: Evangelina Rodriguez MD     Date of admission: 5/29/2025  Date of discharge/transfer: 5/30/2025    Attending physician at admission: Luis Rowe.  Attending physician at discharge/transfer: Luis Rowe.  Resident physician at discharge/transfer: Julianna Rangel, DO     Consultants during hospitalization  IP CONSULT TO CARDIOLOGY     Admission diagnoses   Angina pectoris [I20.9]  Chest pain, unspecified type [R07.9]    Recommended follow-up after discharge    1. PCP-Evangelina Rodriguez MD  2. Cardiology - Dr. Saldana     To follow up on with PCP:   Pt admitted to the hospital for unstable angina. ACS workup including EKG, CXR, and trop all negative. While admitted pt had LHC due to h/o high cardiac calcium score (864). LHC showed no obstructive CAD, echo w/ normla LV function. Will need to continue ASA and Atorvastatin 40 with goal LDL of < 70.   ------------------------------------------------------------------------------------------------------------------    History of Present Illness    As per admitting provider, Dr. Wilson:   \"Rosalind Judd is a 59 y.o. female with known history of CAD, migraines, GERD, TIA (2015) who presents to the ER complaining of chest pain.  Says she was sitting at home around 8:30pm when she had onset of dull, constant chest pain in L side of chest radiating to L neck/jaw. Felt like a pressure and tightening sensation. It lasted for a few hours. Took baby aspirin at home prior to ER. It was relieved by nitro in the ER. No SOB, palpitations, vision changes, n/v, or lightheadedness with episode. Has a HA also. Has had this chest pain off and on for a few years, happens at rest or with exertion. F/w with Dr. Saldana outpatient. Currently has no  well-nourished, no distress. Not diaphoretic.    HENT Head Normocephalic and atraumatic.   Eyes Conjunctivae are normal, no discharge. No scleral icterus.   Nose Nose normal.   Oral Oropharynx is clear and moist.    Cardio Normal rate, regular rhythm. Exam reveals no gallop and no friction rub.   No murmur heard. No chest wall tenderness.    Pulmonary Effort normal and breath sounds normal. No respiratory distress.   No wheezes, no rales.    Abdominal Soft. Bowel sounds normal. No distension. No tenderness.     Deferred.    Extremities No edema of lower extremities. No tenderness. Distal pulses intact.    Neurological Alert and oriented to person, place, and time.    Dermatology Skin is warm and dry. No rash noted. No erythema or pallor.    Psychiatric Affect and judgment normal.        Condition at discharge: {Blank Single Select Template:20061::\"Hospice\",\"Stable.\"}    Labs  Recent Labs     05/29/25  2313   WBC 5.4   HGB 13.0   HCT 37.7        Recent Labs     05/29/25  2313 05/30/25  0434    141   K 3.3* 3.6    111*   CO2 27 25   BUN 13 11   MG 2.2  --      Recent Labs     05/29/25  2313   ALT 23   GLOB 2.7     No results for input(s): \"PH\", \"PCO2\", \"PO2\", \"TNIPOC\", \"INR\", \"APTT\", \"TIBC\", \"GLUCPOC\" in the last 72 hours.    Invalid input(s): \"TROIQ\", \"PTP\", \"FE\", \"PSAT\", \"FERR\", \"GLPOC\"    Micro:  Results       ** No results found for the last 336 hours. **             Imaging:  ***    Procedures / Diagnostic Studies  ***    Chronic diagnoses   Patient Active Problem List   Diagnosis    Panic disorder without agoraphobia    Chronic neck pain    Complicated migraine    S/P hysterectomy    Chest pain    Pelvic congestion syndrome    GERD (gastroesophageal reflux disease)    Anxiety    Vaginal cuff dehiscence    Numbness and tingling in left arm    Hypertensive urgency    Angina pectoris           Medication List        ASK your doctor about these medications      aspirin 81 MG chewable tablet

## 2025-05-30 NOTE — CARE COORDINATION
Case Management Note    Discharge Plan:  Anticipate discharging to home with no CM needs.    Transportation at DC:  Family    60yo admitted for chest pain.  Patient is currently off the unit for cardiac cath today.    Please reach out to CM if any discharge needs arise.    ______________________________________  REMY Jaime, RN-CM  [facility]- Care Management  Available via [employer]  5/30/2025  2:48 PM

## 2025-05-30 NOTE — ED NOTES
TRANSFER - OUT REPORT:    Verbal report given to [facility] on Rosalind Judd  being transferred to [facility] for routine progression of patient care       Report consisted of patient's Situation, Background, Assessment and   Recommendations(SBAR).     Information from the following report(s) Nurse Handoff Report was reviewed with the receiving nurse.    [place] Fall Assessment:    Presents to emergency department  because of falls (Syncope, seizure, or loss of consciousness): No  Age > 70: No  Altered Mental Status, Intoxication with alcohol or substance confusion (Disorientation, impaired judgment, poor safety awaremess, or inability to follow instructions): No  Impaired Mobility: Ambulates or transfers with assistive devices or assistance; Unable to ambulate or transer.: No  Nursing Judgement: No          Lines:       Opportunity for questions and clarification was provided.      Patient transported with:  Monitor

## 2025-05-30 NOTE — CONSULTS
ATTENDING CARDIOLOGIST  The patient was personally examined and chart reviewed. All the elements of history and examination were personally performed and I agree with the plan as listed by advanced practice provider.    Treatment plan was addressed with the patient.      Subjective:  Elevated CCS  ? Recurrent angina    Blood pressure 115/80, pulse 60, temperature 97.7 °F (36.5 °C), temperature source Oral, resp. rate 16, height 1.702 m (5' 7\"), weight 61.2 kg (135 lb), SpO2 97%.  Normal rate, regular rhythm, S1/S2  Lungs clear      A/P:  CP/concern for angina/elevated CCS - plan for cardiac catherization today  NPO.  Continue ASA, check lipids.  Echo.          []    High complexity decision making was performed  []    Patient is at high-risk of decompensation with multiple organ involvement        Coleen Lora MS, MD, [facility]        Coleen Lora MS, MD, [facility]  [facility]  [phone] (P)  [phone] (F)        [facility] CARDIOLOGY                    Cardiology Care Note     [x]Initial Encounter     []Follow-up    Patient Name: Rosalind Judd - :1965 - MRN:[id number]  Primary Cardiologist: Francisco Saldana MD  Consulting Cardiologist: Coleen Lora MD     Reason for encounter: cp    HPI:       Rosalind Judd is a 59 y.o. female with PMH significant for history of CAD, migraines, GERD, TIA () who presents to the ER complaining of chest pain.  Says she was sitting at home around 8:30pm when she had onset of dull, constant chest pain in L side of chest radiating to L neck/jaw. Felt like a pressure and tightening sensation. It lasted for a few hours. Took baby aspirin at home prior to ER. It was relieved by nitro in the ER. No SOB, palpitations, vision changes, n/v, or lightheadedness with episode. Has a HA also. This is her second episode this week. Stress test 2 yrs ago no ischemia  Non smoker  Works as a      Subjective:      Rosalind Judd reports none.     Assessment and  patient's exercise capacity was above average for their age. The patient reached stage 3 of the protocol And was stressed for 9 min and 0 sec. Hemodynamics are adequate for diagnosis. Blood pressure demonstrated a normal response and heart rate demonstrated a normal response to stress. The patient's heart rate recovery was normal. The patient reported dyspnea and no chest pain during the stress test.    Signed by: Francisco Saldana MD on 3/15/2023  1:47 PM        Most recent HS troponins:  Recent Labs     05/29/25  2313   TROPHS 4       ECG:   Encounter Date: 05/29/25   EKG 12 Lead   Result Value    Ventricular Rate 61    Atrial Rate 61    P-R Interval 160    QRS Duration 86    Q-T Interval 416    QTc Calculation (Bazett) 418    P Axis 57    R Axis 62    T Axis 53    Diagnosis      Normal sinus rhythm  Normal ECG  When compared with ECG of 29-MAY-2025 22:55,  MANUAL COMPARISON REQUIRED, DATA IS UNCONFIRMED           Review of Systems:    [x]All other systems reviewed and all negative except as written in HPI    [] Patient unable to provide secondary to condition    Past Medical History:   Diagnosis Date    Anxiety     Arthritis     Cancer (HCC)     BASAL CELL SKIN CANCER ON CHEST    Chronic neck pain 6/27/2015    Fatigue     GERD (gastroesophageal reflux disease) 3/12/2014    Headache     Hiatal hernia     Injury of elbow, left 1/2/2015    Panic disorder     PUD (peptic ulcer disease)     Swelling of joint, elbow, left 1/2/2015    TIA (transient ischemic attack) 6/27/2015     Past Surgical History:   Procedure Laterality Date    GYN      BTL    HEENT      WISDOM TOOTH EXTRACTION       Social Hx:  reports that she quit smoking about 37 years ago. Her smoking use included cigarettes. She started smoking about 43 years ago. She has a 1.5 pack-year smoking history. She has never used smokeless tobacco. She reports current alcohol use of about 2.0 standard drinks of alcohol per week. She reports that she does not use

## 2025-05-30 NOTE — ED TRIAGE NOTES
Pt states that she had chest pain that started around 8:30 tonight she states it starts in center of her chest that goes up to her left neck states that its not so much pain as discomfort. Denies nausea, vomiting, sweating or shortness of breath. Took two 81mg asa at 8:30.

## 2025-05-30 NOTE — H&P
[address] [id number]   Office [phone]  Fax [phone]       Admission H&P     Name: Rosalind Judd MRN: [id number]  Sex: Female   YOB: 1965  Age: 59 y.o.  PCP: Evangelina Rodriguez MD     Source of Information: patient, medical records    Chief complaint: chest pain    History of Present Illness  Rosalind Judd is a 59 y.o. female with known history of CAD, migraines, GERD, TIA (2015) who presents to the ER complaining of chest pain.  Says she was sitting at home around 8:30pm when she had onset of dull, constant chest pain in L side of chest radiating to L neck/jaw. Felt like a pressure and tightening sensation. It lasted for a few hours. Took baby aspirin at home prior to ER. It was relieved by nitro in the ER. No SOB, palpitations, vision changes, n/v, or lightheadedness with episode. Has a HA also. Has had this chest pain off and on for a few years, happens at rest or with exertion. F/w with Dr. Saldana outpatient. Currently has no symptoms.    In the ER:      Vitals:  Patient Vitals for the past 8 hrs:   Temp Pulse Resp BP SpO2   05/30/25 0012 -- 71 -- -- --   05/30/25 0002 -- 69 15 127/83 98 %   05/29/25 2355 -- 79 24 -- 98 %   05/29/25 2334 -- 69 16 120/81 97 %   05/29/25 2319 -- 76 16 120/83 95 %   05/29/25 2312 -- 98 19 (!) 123/91 96 %   05/29/25 2311 -- 83 17 115/82 95 %   05/29/25 2305 -- 68 14 120/83 98 %   05/29/25 2304 -- 71 14 -- --   05/29/25 2301 -- 77 22 133/80 --   05/29/25 2300 -- 76 12 133/80 98 %   05/29/25 2255 98 °F (36.7 °C) 79 18 (!) 171/107 99 %         Labs:   Recent Labs     05/29/25 2313   WBC 5.4   HGB 13.0   HCT 37.7        Recent Labs     05/29/25 2313      K 3.3*      CO2 27   BUN 13   MG 2.2     Recent Labs     05/29/25 2313   GLOB 2.7     No results for input(s): \"INR\", \"APTT\" in the last 72 hours.    Invalid input(s): \"PTP\"   Invalid input(s): \"PHI\", \"PCO2I\", \"PO2I\", \"FIO2I\"  No results for input(s): \"CPK\",  29-MAY-2025 22:55,  MANUAL COMPARISON REQUIRED, DATA IS UNCONFIRMED         Imaging  No new.      Assessment and Plan     Rosalind Judd is a 59 y.o. female with a PMHx of CAD, migraines, GERD, TIA (2015)  who is admitted for unstable angina/ACS workup.    Unstable Angina  ACS workup:  Presenting with chest pain that began at rest and relieved with nitro. Trop neg at 4. EKG nonischemic, NSR. CXR NAP. Symptoms now resolved. Has had this intermittently for years. Went to ER in 3/2025 with similar sx, overall reassuring workup and discharged for outpatient follow up. F/w Dr. Saldana outpatient. Stress test in 2023 without evidence of ischemia. Coronary Ca score 864 so patient is high risk for ACS. Low suspicion for NSTEMI/STEMI as trop was negative. Admitting for cardiology evaluation and monitoring given she is higher risk.   - Admit to Observation with Remote Tele  - Monitor vitals per unit protocol  - I&O  - O2 prn, wean as tolerated  - ASA 81mg qd  - nitro tablet prn  - Daily CBC, BMP  - Consult Cardiology for possible stress test vs cath  - NPO prior to Cardiology eval  - SCDs    GERD: chronic, stable.  - continue home protonix 40mg qd    Anxiety: chronic, stable  - continue home buspar 7.5mg bid    Migraines: chronic, stable. Reported HA on admission, but not as severe as typical migraines.  - continue home fioricet and ubrogepant prn for migraines    Menopause: chronic, stable.  - continue home vaginal estradiol tabs twice weekly (next dose due)      FEN/GI - NPO.   Activity - Ambulate as tolerated  DVT prophylaxis - SCDs  GI prophylaxis - Protonix  Fall prophylaxis - Not indicated at this time.  Disposition - Admit to Observation with Remote Telemetry. Plan to d/c to Home.   Code Status - Full, discussed with patient / caregivers.  Next of Kin Name and Contact     Norah Skinner (Child)  [phone] (Home Phone)       Patient Rosalind Judd will be discussed with Dr. Kyle Jaramillo.     1:47 AM, 05/30/25  Emerita

## 2025-05-30 NOTE — DISCHARGE INSTRUCTIONS
HOME DISCHARGE INSTRUCTIONS    Rosalind Judd / [id number] : 1965    Admission date: 2025 Discharge date: 2025 2:39 PM     Please bring this form with you to show your care provider at your follow-up appointment.    Primary care provider: Evangelina Rodriguez    Discharging provider:  Sherri Mendoza MD  - Family Medicine Resident  Dr Rowe - Family Medicine Attending      You have been admitted to the hospital with the following diagnoses:    You were admitted to the hospital due to chest pain. Due to your history of an elevated cardiac calcium score. A cardiac catheterization was performed by the cardiology which showed no obstructive coronary artery disease. They recommend you continue daily baby aspirin and start on cholesterol medication, Atorvastatin 40mg.  Please follow-up with cardiology and your PCP.       ACUTE DIAGNOSES:  Angina pectoris [I20.9]  Chest pain, unspecified type [R07.9]    Recommended follow-up after discharge  1. PCP-Evangelina Rodriguez      Follow up plans/appointments  Evangelina Rodriguez MD  [address] [facility] [address]  [facility] [address] [phone]          Francisco Saldana MD  [address] [facility]  [address]  [facility] [id number] [phone]    Follow up on 2025  3 pm      Diet: Regular .    Activity:  As tolerated     Disposition: Home    Wound care: None    Equipment needed:  None     Specific symptoms to watch for: chest pain, shortness of breath,   fever, chills,   nausea, vomiting, diarrhea  change in mentation, falling, weakness, bleeding.     What to do if new or unexpected symptoms occur?    If you experience any of the above symptoms (or should other concerns or questions arise after discharge) please call your primary care physician. Return to the emergency room if you cannot get hold of your doctor.    It is very important that you keep your follow-up appointment(s).  Please bring discharge papers, medication list (and/or medication bottles) to

## 2025-06-02 ENCOUNTER — LAB (OUTPATIENT)
Age: 60
End: 2025-06-02

## 2025-06-02 DIAGNOSIS — R53.81 MALAISE AND FATIGUE: ICD-10-CM

## 2025-06-02 DIAGNOSIS — Z13.1 SCREENING FOR DIABETES MELLITUS: ICD-10-CM

## 2025-06-02 DIAGNOSIS — R53.83 MALAISE AND FATIGUE: ICD-10-CM

## 2025-06-02 DIAGNOSIS — E55.9 VITAMIN D DEFICIENCY: ICD-10-CM

## 2025-06-02 DIAGNOSIS — R07.9 INTERMITTENT CHEST PAIN: ICD-10-CM

## 2025-06-02 LAB — ECHO BSA: 1.7 M2

## 2025-06-03 LAB
25(OH)D3 SERPL-MCNC: 47 NG/ML (ref 30–100)
ALBUMIN SERPL-MCNC: 4.1 G/DL (ref 3.5–5)
ALBUMIN/GLOB SERPL: 1.5 (ref 1.1–2.2)
ALP SERPL-CCNC: 84 U/L (ref 45–117)
ALT SERPL-CCNC: 20 U/L (ref 12–78)
ANION GAP SERPL CALC-SCNC: 3 MMOL/L (ref 2–12)
AST SERPL-CCNC: 9 U/L (ref 15–37)
BASOPHILS # BLD: 0.06 K/UL (ref 0–0.1)
BASOPHILS NFR BLD: 1.6 % (ref 0–1)
BILIRUB SERPL-MCNC: 0.5 MG/DL (ref 0.2–1)
BUN SERPL-MCNC: 15 MG/DL (ref 6–20)
BUN/CREAT SERPL: 22 (ref 12–20)
CALCIUM SERPL-MCNC: 9.7 MG/DL (ref 8.5–10.1)
CHLORIDE SERPL-SCNC: 108 MMOL/L (ref 97–108)
CHOLEST SERPL-MCNC: 210 MG/DL
CO2 SERPL-SCNC: 30 MMOL/L (ref 21–32)
CREAT SERPL-MCNC: 0.67 MG/DL (ref 0.55–1.02)
DIFFERENTIAL METHOD BLD: ABNORMAL
EOSINOPHIL # BLD: 0.15 K/UL (ref 0–0.4)
EOSINOPHIL NFR BLD: 3.9 % (ref 0–7)
ERYTHROCYTE [DISTWIDTH] IN BLOOD BY AUTOMATED COUNT: 12.5 % (ref 11.5–14.5)
EST. AVERAGE GLUCOSE BLD GHB EST-MCNC: 105 MG/DL
GLOBULIN SER CALC-MCNC: 2.7 G/DL (ref 2–4)
GLUCOSE SERPL-MCNC: 97 MG/DL (ref 65–100)
HBA1C MFR BLD: 5.3 % (ref 4–5.6)
HCT VFR BLD AUTO: 42.2 % (ref 35–47)
HDLC SERPL-MCNC: 101 MG/DL
HDLC SERPL: 2.1 (ref 0–5)
HGB BLD-MCNC: 13 G/DL (ref 11.5–16)
IMM GRANULOCYTES # BLD AUTO: 0.01 K/UL (ref 0–0.04)
IMM GRANULOCYTES NFR BLD AUTO: 0.3 % (ref 0–0.5)
LDLC SERPL CALC-MCNC: 99.4 MG/DL (ref 0–100)
LYMPHOCYTES # BLD: 1.51 K/UL (ref 0.8–3.5)
LYMPHOCYTES NFR BLD: 39.4 % (ref 12–49)
MCH RBC QN AUTO: 30.2 PG (ref 26–34)
MCHC RBC AUTO-ENTMCNC: 30.8 G/DL (ref 30–36.5)
MCV RBC AUTO: 98.1 FL (ref 80–99)
MONOCYTES # BLD: 0.38 K/UL (ref 0–1)
MONOCYTES NFR BLD: 9.9 % (ref 5–13)
NEUTS SEG # BLD: 1.72 K/UL (ref 1.8–8)
NEUTS SEG NFR BLD: 44.9 % (ref 32–75)
NRBC # BLD: 0 K/UL (ref 0–0.01)
NRBC BLD-RTO: 0 PER 100 WBC
PLATELET # BLD AUTO: 245 K/UL (ref 150–400)
PMV BLD AUTO: 10.6 FL (ref 8.9–12.9)
POTASSIUM SERPL-SCNC: 4.3 MMOL/L (ref 3.5–5.1)
PROT SERPL-MCNC: 6.8 G/DL (ref 6.4–8.2)
RBC # BLD AUTO: 4.3 M/UL (ref 3.8–5.2)
SODIUM SERPL-SCNC: 141 MMOL/L (ref 136–145)
T4 FREE SERPL-MCNC: 0.9 NG/DL (ref 0.8–1.5)
TRIGL SERPL-MCNC: 48 MG/DL
TSH SERPL DL<=0.05 MIU/L-ACNC: 1.98 UIU/ML (ref 0.36–3.74)
VLDLC SERPL CALC-MCNC: 9.6 MG/DL
WBC # BLD AUTO: 3.8 K/UL (ref 3.6–11)

## 2025-06-06 ENCOUNTER — RESULTS FOLLOW-UP (OUTPATIENT)
Age: 60
End: 2025-06-06

## 2025-06-06 NOTE — TELEPHONE ENCOUNTER
Called pt and relayed results, she understood. Pt states she is not currently on her lipid lowering medication anymore because it gave her severe headaches. I advised patient you were out of office and that everything will be discussed with her on 6/9 during her appointment

## 2025-06-06 NOTE — TELEPHONE ENCOUNTER
----- Message from Dr. Evangelina Rodriguez MD sent at 6/6/2025  8:08 AM EDT -----  Please let patient know that her lipids were elevated, but she is now on lipid lowering medications.   I have reviewed her hospital course and recent events.   She has an appointment with me on 6/9/2025 for follow up.   Look forward to seeing her then.   Thanks!

## 2025-06-09 ENCOUNTER — OFFICE VISIT (OUTPATIENT)
Age: 60
End: 2025-06-09

## 2025-06-09 VITALS
TEMPERATURE: 98.2 F | RESPIRATION RATE: 16 BRPM | HEART RATE: 76 BPM | DIASTOLIC BLOOD PRESSURE: 70 MMHG | OXYGEN SATURATION: 98 % | BODY MASS INDEX: 20.65 KG/M2 | WEIGHT: 131.6 LBS | SYSTOLIC BLOOD PRESSURE: 118 MMHG | HEIGHT: 67 IN

## 2025-06-09 DIAGNOSIS — Z12.11 SCREENING FOR COLON CANCER: ICD-10-CM

## 2025-06-09 DIAGNOSIS — I25.118 CORONARY ARTERY DISEASE OF NATIVE ARTERY OF NATIVE HEART WITH STABLE ANGINA PECTORIS: Primary | ICD-10-CM

## 2025-06-09 DIAGNOSIS — Z09 HOSPITAL DISCHARGE FOLLOW-UP: ICD-10-CM

## 2025-06-09 ASSESSMENT — PATIENT HEALTH QUESTIONNAIRE - PHQ9
2. FEELING DOWN, DEPRESSED OR HOPELESS: NOT AT ALL
8. MOVING OR SPEAKING SO SLOWLY THAT OTHER PEOPLE COULD HAVE NOTICED. OR THE OPPOSITE, BEING SO FIGETY OR RESTLESS THAT YOU HAVE BEEN MOVING AROUND A LOT MORE THAN USUAL: NOT AT ALL
3. TROUBLE FALLING OR STAYING ASLEEP: NOT AT ALL
SUM OF ALL RESPONSES TO PHQ QUESTIONS 1-9: 0
SUM OF ALL RESPONSES TO PHQ QUESTIONS 1-9: 0
4. FEELING TIRED OR HAVING LITTLE ENERGY: NOT AT ALL
10. IF YOU CHECKED OFF ANY PROBLEMS, HOW DIFFICULT HAVE THESE PROBLEMS MADE IT FOR YOU TO DO YOUR WORK, TAKE CARE OF THINGS AT HOME, OR GET ALONG WITH OTHER PEOPLE: NOT DIFFICULT AT ALL
1. LITTLE INTEREST OR PLEASURE IN DOING THINGS: NOT AT ALL
SUM OF ALL RESPONSES TO PHQ QUESTIONS 1-9: 0
9. THOUGHTS THAT YOU WOULD BE BETTER OFF DEAD, OR OF HURTING YOURSELF: NOT AT ALL
5. POOR APPETITE OR OVEREATING: NOT AT ALL
6. FEELING BAD ABOUT YOURSELF - OR THAT YOU ARE A FAILURE OR HAVE LET YOURSELF OR YOUR FAMILY DOWN: NOT AT ALL
SUM OF ALL RESPONSES TO PHQ QUESTIONS 1-9: 0
7. TROUBLE CONCENTRATING ON THINGS, SUCH AS READING THE NEWSPAPER OR WATCHING TELEVISION: NOT AT ALL

## 2025-06-09 NOTE — PROGRESS NOTES
Identified pt with two pt identifiers(name and )    Chief Complaint   Patient presents with    Follow-Up from Hospital     -  Angina pectoris  [facility]        Health Maintenance Due   Topic    Colorectal Cancer Screen     Pneumococcal 50+ years Vaccine (1 of 1 - PCV)    DTaP/Tdap/Td vaccine (2 - Td or Tdap)    COVID-19 Vaccine (3 -  season)       Wt Readings from Last 3 Encounters:   25 59.7 kg (131 lb 9.6 oz)   25 61.2 kg (135 lb)   25 60.2 kg (132 lb 12.8 oz)     Temp Readings from Last 3 Encounters:   25 98.2 °F (36.8 °C) (Temporal)   25 97.3 °F (36.3 °C) (Oral)   25 98.2 °F (36.8 °C) (Temporal)     BP Readings from Last 3 Encounters:   25 118/70   25 111/77   25 132/74     Pulse Readings from Last 3 Encounters:   25 76   25 58   25 78           Depression Screening:  :         2025    12:59 PM 2025     3:25 PM 10/17/2024     2:55 PM 7/10/2023     4:42 PM 2022     9:32 AM   PHQ-9 Questionaire   Little interest or pleasure in doing things 0 0 0 0 0   Feeling down, depressed, or hopeless 0 0 0 0 0   Trouble falling or staying asleep, or sleeping too much 0 0      Feeling tired or having little energy 0 0      Poor appetite or overeating 0 0      Feeling bad about yourself - or that you are a failure or have let yourself or your family down 0 0      Trouble concentrating on things, such as reading the newspaper or watching television 0 0      Moving or speaking so slowly that other people could have noticed. Or the opposite - being so fidgety or restless that you have been moving around a lot more than usual 0 0      Thoughts that you would be better off dead, or of hurting yourself in some way 0 0      PHQ-9 Total Score 0 0 0 0 0   If you checked off any problems, how difficult have these problems made it for you to do your work, take care of things at home, or get along with other people? 0 0           Fall Risk 
tablet  Commonly known as: VAGIFEM     metoprolol tartrate 25 MG tablet  Commonly known as: LOPRESSOR     Onabotulinumtoxin A 200 units injection  Commonly known as: Botox  INJECT 155 UNITS IN THE MUSCLE TO 31 FDA APPROVED SITES, FACE, AND NECK EVERY 12 WEEKS FOR CHRONIC MIGRAINE PREVENTION     pantoprazole 40 MG tablet  Commonly known as: PROTONIX     Ubrogepant 100 MG Tabs     valACYclovir 500 MG tablet  Commonly known as: VALTREX              Medications marked \"taking\" at this time  No outpatient medications have been marked as taking for the 6/9/25 encounter (Office Visit) with Evangelina Rodriguez MD.      Medications patient taking as of now reconciled against medications ordered at time of hospital discharge: Yes    Objective:    Visit Vitals  /70 (BP Site: Right Upper Arm, Patient Position: Sitting, BP Cuff Size: Medium Adult)   Pulse 76   Temp 98.2 °F (36.8 °C) (Temporal)   Resp 16   Ht 1.702 m (5' 7\")   Wt 59.7 kg (131 lb 9.6 oz)   SpO2 98%   BMI 20.61 kg/m²       General: alert, cooperative, no distress   Mental  status: normal mood, behavior, speech, dress, motor activity, and thought processes, able to follow commands   HENT: NCAT   Neck: no visualized mass   Resp: no respiratory distress   Neuro: no gross deficits   Skin: no discoloration or lesions of concern on visible areas   Psychiatric: normal affect, consistent with stated mood, no evidence of hallucinations     Evangelina Rodriguez MD  [facility]  6/9/25

## 2025-08-07 ENCOUNTER — OFFICE VISIT (OUTPATIENT)
Age: 60
End: 2025-08-07
Payer: COMMERCIAL

## 2025-08-07 ENCOUNTER — HOSPITAL ENCOUNTER (OUTPATIENT)
Facility: HOSPITAL | Age: 60
Discharge: HOME | End: 2025-08-10
Payer: COMMERCIAL

## 2025-08-07 VITALS
RESPIRATION RATE: 16 BRPM | WEIGHT: 132 LBS | HEART RATE: 76 BPM | HEIGHT: 67 IN | TEMPERATURE: 98.2 F | SYSTOLIC BLOOD PRESSURE: 112 MMHG | OXYGEN SATURATION: 97 % | BODY MASS INDEX: 20.72 KG/M2 | DIASTOLIC BLOOD PRESSURE: 64 MMHG

## 2025-08-07 VITALS
DIASTOLIC BLOOD PRESSURE: 69 MMHG | HEART RATE: 64 BPM | RESPIRATION RATE: 17 BRPM | SYSTOLIC BLOOD PRESSURE: 107 MMHG | OXYGEN SATURATION: 98 %

## 2025-08-07 DIAGNOSIS — S59.901A INJURY OF RIGHT ELBOW, INITIAL ENCOUNTER: ICD-10-CM

## 2025-08-07 DIAGNOSIS — S59.901A INJURY OF RIGHT ELBOW, INITIAL ENCOUNTER: Primary | ICD-10-CM

## 2025-08-07 DIAGNOSIS — G43.709 CHRONIC MIGRAINE WITHOUT AURA, NOT INTRACTABLE, WITHOUT STATUS MIGRAINOSUS: Primary | ICD-10-CM

## 2025-08-07 PROCEDURE — 99213 OFFICE O/P EST LOW 20 MIN: CPT | Performed by: INTERNAL MEDICINE

## 2025-08-07 PROCEDURE — 3074F SYST BP LT 130 MM HG: CPT | Performed by: INTERNAL MEDICINE

## 2025-08-07 PROCEDURE — 73080 X-RAY EXAM OF ELBOW: CPT

## 2025-08-07 PROCEDURE — 99214 OFFICE O/P EST MOD 30 MIN: CPT | Performed by: NURSE PRACTITIONER

## 2025-08-07 PROCEDURE — 3078F DIAST BP <80 MM HG: CPT | Performed by: INTERNAL MEDICINE

## 2025-08-07 PROCEDURE — 3074F SYST BP LT 130 MM HG: CPT | Performed by: NURSE PRACTITIONER

## 2025-08-07 PROCEDURE — 3078F DIAST BP <80 MM HG: CPT | Performed by: NURSE PRACTITIONER

## 2025-08-07 RX ORDER — DICLOFENAC POTASSIUM 50 MG/1
POWDER, FOR SOLUTION ORAL
COMMUNITY

## 2025-08-07 RX ORDER — AMLODIPINE BESYLATE 2.5 MG/1
2.5 TABLET ORAL DAILY
COMMUNITY

## 2025-08-07 RX ORDER — NITROGLYCERIN 0.4 MG/1
TABLET SUBLINGUAL
COMMUNITY

## 2025-08-07 RX ORDER — MINOXIDIL 2.5 MG/1
TABLET ORAL
COMMUNITY

## 2025-08-07 ASSESSMENT — PATIENT HEALTH QUESTIONNAIRE - PHQ9
SUM OF ALL RESPONSES TO PHQ QUESTIONS 1-9: 0
SUM OF ALL RESPONSES TO PHQ QUESTIONS 1-9: 0
2. FEELING DOWN, DEPRESSED OR HOPELESS: NOT AT ALL
SUM OF ALL RESPONSES TO PHQ QUESTIONS 1-9: 0
SUM OF ALL RESPONSES TO PHQ QUESTIONS 1-9: 0
1. LITTLE INTEREST OR PLEASURE IN DOING THINGS: NOT AT ALL

## 2025-08-11 ENCOUNTER — TELEPHONE (OUTPATIENT)
Age: 60
End: 2025-08-11

## 2025-08-15 ENCOUNTER — TELEPHONE (OUTPATIENT)
Age: 60
End: 2025-08-15

## 2025-08-21 ENCOUNTER — OFFICE VISIT (OUTPATIENT)
Age: 60
End: 2025-08-21

## 2025-08-21 DIAGNOSIS — G43.709 CHRONIC MIGRAINE WITHOUT AURA, NOT INTRACTABLE, WITHOUT STATUS MIGRAINOSUS: Primary | ICD-10-CM

## (undated) DEVICE — Device

## (undated) DEVICE — GOWN,SIRUS,FABRNF,XL,20/CS: Brand: MEDLINE

## (undated) DEVICE — TK® TI-KNOT® DEVICE: Brand: TK® TI-KNOT®

## (undated) DEVICE — CATHETER DIAG 5FR L100CM LUMN ID0.047IN JR4 CRV 0 SIDE H

## (undated) DEVICE — LAPAROSCOPIC TROCAR SLEEVE/SINGLE USE: Brand: KII® OPTICAL ACCESS SYSTEM

## (undated) DEVICE — ARGO BAGZ FLUID MANAGEMENT SYSTEM: Brand: ARGO BAGZ FLUID MANAGEMENT SYSTEM

## (undated) DEVICE — SURGICAL PROCEDURE PACK GYN LAPAROSCOPY CUST SMH LF

## (undated) DEVICE — DRAPE,REIN 53X77,STERILE: Brand: MEDLINE

## (undated) DEVICE — COVER LT HNDL PLAS RIG 1 PER PK

## (undated) DEVICE — SYR 10ML LUER LOK 1/5ML GRAD --

## (undated) DEVICE — GARMENT,MEDLINE,DVT,INT,CALF,MED, GEN2: Brand: MEDLINE

## (undated) DEVICE — TK® QUICK LOAD® UNIT: Brand: TK® QUICK LOAD®

## (undated) DEVICE — STERILE NEOPRENE POWDER-FREE SURGICAL GLOVES WITH NITRILE COATING: Brand: PROTEXIS

## (undated) DEVICE — ROCKER SWITCH PENCIL BLADE ELECTRODE, HOLSTER: Brand: EDGE

## (undated) DEVICE — REM POLYHESIVE ADULT PATIENT RETURN ELECTRODE: Brand: VALLEYLAB

## (undated) DEVICE — BULB SYRINGE, IRRIGATION WITH PROTECTIVE CAP, 60 CC, INDIVIDUALLY WRAPPED: Brand: DOVER

## (undated) DEVICE — INFECTION CONTROL KIT SYS

## (undated) DEVICE — COVER,TABLE,60X90,STERILE: Brand: MEDLINE

## (undated) DEVICE — STERILE POLYISOPRENE POWDER-FREE SURGICAL GLOVES WITH EMOLLIENT COATING: Brand: PROTEXIS

## (undated) DEVICE — TOTAL TRAY, DB, 100% SILI FOLEY, 16FR 10: Brand: MEDLINE

## (undated) DEVICE — SUTURE MCRYL SZ 4-0 L27IN ABSRB UD L19MM PS-2 1/2 CIR PRIM Y426H

## (undated) DEVICE — Z INACTIVE USE 2527070 DRAPE SURG W40XL44IN UNDERBUTTOCK SMS POLYPR W/ PCH BK DISP

## (undated) DEVICE — SOLUTION IV 1000ML 0.9% SOD CHL

## (undated) DEVICE — GUIDEWIRE VASC L180CM 0035IN 15MM J ROSEN TIP PTFE FIX COR

## (undated) DEVICE — DRAPE,LITHOTOMY,STERILE: Brand: MEDLINE

## (undated) DEVICE — SHEARS ENDOSCP L36CM DIA5MM ULTRASONIC CRV TIP HARM

## (undated) DEVICE — SYR 50ML LR LCK 1ML GRAD NSAF --

## (undated) DEVICE — TUBING PRSS MON L12IN PVC RIG NONEXPANDING M TO FEM CONN

## (undated) DEVICE — DERMABOND SKIN ADH 0.7ML -- DERMABOND ADVANCED 12/BX

## (undated) DEVICE — GLOVE SURG SZ 75 L1212IN FNGR THK138MIL BRN LTX FREE

## (undated) DEVICE — 5MM RD180® DEVICE: Brand: RD180® - THE RUNNING DEVICE®

## (undated) DEVICE — HANDLE LT SNAP ON ULT DURABLE LENS FOR TRUMPF ALC DISPOSABLE

## (undated) DEVICE — TR BAND RADIAL ARTERY COMPRESSION DEVICE: Brand: TR BAND

## (undated) DEVICE — SUTURE VCRL SZ 0 L36IN ABSRB VLT L36MM CT-1 1/2 CIR J346H

## (undated) DEVICE — SURGICAL PROCEDURE PACK BASIN MAJ SET CUST NO CAUT

## (undated) DEVICE — PAD,SANITARY,11 IN,MAXI,N-STRL,IND WRAP: Brand: MEDLINE

## (undated) DEVICE — CATHETER DIAG 5FR L100CM LUMN ID0.047IN JL3.5 CRV 0 SIDE H

## (undated) DEVICE — CANISTER, RIGID, 3000CC: Brand: MEDLINE INDUSTRIES, INC.

## (undated) DEVICE — GLIDESHEATH SLENDER STAINLESS STEEL KIT: Brand: GLIDESHEATH SLENDER

## (undated) DEVICE — STRAP,POSITIONING,KNEE/BODY,FOAM,4X60": Brand: MEDLINE

## (undated) DEVICE — HEART CATH-SFMC: Brand: MEDLINE INDUSTRIES, INC.

## (undated) DEVICE — TRAY PREP DRY W/ PREM GLV 2 APPL 6 SPNG 2 UNDPD 1 OVERWRAP

## (undated) DEVICE — GAUZE,SPONGE,FLUFF,6"X6.75",STRL,5/TRAY: Brand: MEDLINE

## (undated) DEVICE — TROCAR: Brand: KII® SLEEVE

## (undated) DEVICE — (D)PREP SKN CHLRAPRP APPL 26ML -- CONVERT TO ITEM 371833

## (undated) DEVICE — TUBING, SUCTION, 1/4" X 12', STRAIGHT: Brand: MEDLINE

## (undated) DEVICE — SHEAR HARMONIC ACET 5MMX36CM -- ACE PLUS

## (undated) DEVICE — YANKAUER,BULB TIP,W/O VENT,RIGID,STERILE: Brand: MEDLINE